# Patient Record
Sex: FEMALE | Race: WHITE | NOT HISPANIC OR LATINO | Employment: OTHER | URBAN - METROPOLITAN AREA
[De-identification: names, ages, dates, MRNs, and addresses within clinical notes are randomized per-mention and may not be internally consistent; named-entity substitution may affect disease eponyms.]

---

## 2020-01-28 ENCOUNTER — TELEPHONE (OUTPATIENT)
Dept: GASTROENTEROLOGY | Facility: CLINIC | Age: 60
End: 2020-01-28

## 2020-01-28 NOTE — TELEPHONE ENCOUNTER
----- Message from Patsy Brady sent at 1/28/2020  1:39 PM CST -----  [facility]  Patient was referred by Dr. Yun Wong for possible liver transplant, patient does have Medicaid, please call call Ms Lucero back at [phone]. Thank you

## 2020-01-28 NOTE — TELEPHONE ENCOUNTER
Spoke with Jazmine at [facility][1] who states that she wanted to know if referral from Dr. Yun Wong was received. Made Jazmine aware that I did see a referral in the chart and offered to schedule an appointment. Jazmine stated that she will speak with patient to see if appointment is still wanted. Patient has an interview scheduled with Hospice this week.     Jazmine states that she will give me a call back if patient decides to schedule appointment.

## 2020-02-12 ENCOUNTER — OFFICE VISIT (OUTPATIENT)
Dept: GASTROENTEROLOGY | Facility: CLINIC | Age: 60
End: 2020-02-12
Payer: MEDICAID

## 2020-02-12 ENCOUNTER — LAB VISIT (OUTPATIENT)
Dept: LAB | Facility: HOSPITAL | Age: 60
End: 2020-02-12
Attending: INTERNAL MEDICINE
Payer: MEDICAID

## 2020-02-12 VITALS
DIASTOLIC BLOOD PRESSURE: 68 MMHG | BODY MASS INDEX: 26.17 KG/M2 | HEART RATE: 102 BPM | SYSTOLIC BLOOD PRESSURE: 128 MMHG | HEIGHT: 63 IN | WEIGHT: 147.69 LBS

## 2020-02-12 DIAGNOSIS — R76.8 HEPATITIS C ANTIBODY TEST POSITIVE: ICD-10-CM

## 2020-02-12 DIAGNOSIS — K74.69 OTHER CIRRHOSIS OF LIVER: ICD-10-CM

## 2020-02-12 DIAGNOSIS — K74.69 OTHER CIRRHOSIS OF LIVER: Primary | ICD-10-CM

## 2020-02-12 DIAGNOSIS — R18.8 OTHER ASCITES: ICD-10-CM

## 2020-02-12 LAB
INR PPP: 1.4 (ref 0.8–1.2)
PROTHROMBIN TIME: 14.7 SEC (ref 9–12.5)

## 2020-02-12 PROCEDURE — 99213 OFFICE O/P EST LOW 20 MIN: CPT | Mod: PBBFAC | Performed by: INTERNAL MEDICINE

## 2020-02-12 PROCEDURE — 85027 COMPLETE CBC AUTOMATED: CPT

## 2020-02-12 PROCEDURE — 87522 HEPATITIS C REVRS TRNSCRPJ: CPT

## 2020-02-12 PROCEDURE — 82105 ALPHA-FETOPROTEIN SERUM: CPT

## 2020-02-12 PROCEDURE — 80053 COMPREHEN METABOLIC PANEL: CPT

## 2020-02-12 PROCEDURE — 99999 PR PBB SHADOW E&M-EST. PATIENT-LVL III: CPT | Mod: PBBFAC,,, | Performed by: INTERNAL MEDICINE

## 2020-02-12 PROCEDURE — 86790 VIRUS ANTIBODY NOS: CPT

## 2020-02-12 PROCEDURE — 85610 PROTHROMBIN TIME: CPT

## 2020-02-12 PROCEDURE — 36415 COLL VENOUS BLD VENIPUNCTURE: CPT

## 2020-02-12 PROCEDURE — 85007 BL SMEAR W/DIFF WBC COUNT: CPT

## 2020-02-12 PROCEDURE — 99205 PR OFFICE/OUTPT VISIT, NEW, LEVL V, 60-74 MIN: ICD-10-PCS | Mod: S$PBB,,, | Performed by: INTERNAL MEDICINE

## 2020-02-12 PROCEDURE — 99205 OFFICE O/P NEW HI 60 MIN: CPT | Mod: S$PBB,,, | Performed by: INTERNAL MEDICINE

## 2020-02-12 PROCEDURE — 99999 PR PBB SHADOW E&M-EST. PATIENT-LVL III: ICD-10-PCS | Mod: PBBFAC,,, | Performed by: INTERNAL MEDICINE

## 2020-02-12 RX ORDER — SPIRONOLACTONE 50 MG/1
50 TABLET, FILM COATED ORAL 2 TIMES DAILY
COMMUNITY
Start: 2020-01-21 | End: 2020-04-08 | Stop reason: SDUPTHER

## 2020-02-12 RX ORDER — CYCLOBENZAPRINE HCL 10 MG
10 TABLET ORAL 3 TIMES DAILY PRN
COMMUNITY
Start: 2020-02-07 | End: 2020-02-17

## 2020-02-12 RX ORDER — ALBUTEROL SULFATE 90 UG/1
2 AEROSOL, METERED RESPIRATORY (INHALATION) EVERY 6 HOURS
COMMUNITY
Start: 2020-01-02

## 2020-02-12 RX ORDER — LACTULOSE 10 G/15ML
30 SOLUTION ORAL 3 TIMES DAILY
COMMUNITY
End: 2020-03-07 | Stop reason: SDUPTHER

## 2020-02-12 RX ORDER — CIPROFLOXACIN 250 MG/1
750 TABLET, FILM COATED ORAL
COMMUNITY
Start: 2020-02-03 | End: 2020-02-12 | Stop reason: SDUPTHER

## 2020-02-12 RX ORDER — ONDANSETRON 4 MG/1
4 TABLET, ORALLY DISINTEGRATING ORAL EVERY 8 HOURS PRN
COMMUNITY
Start: 2020-02-07 | End: 2020-02-14

## 2020-02-12 RX ORDER — PANTOPRAZOLE SODIUM 40 MG/1
40 TABLET, DELAYED RELEASE ORAL 2 TIMES DAILY
COMMUNITY
Start: 2019-12-22 | End: 2020-05-20 | Stop reason: SDUPTHER

## 2020-02-12 RX ORDER — CIPROFLOXACIN 500 MG/1
500 TABLET ORAL DAILY
Qty: 30 TABLET | Refills: 2 | Status: SHIPPED | OUTPATIENT
Start: 2020-02-12 | End: 2020-03-13

## 2020-02-12 RX ORDER — FUROSEMIDE 40 MG/1
40 TABLET ORAL DAILY
COMMUNITY
Start: 2020-02-08 | End: 2020-02-14 | Stop reason: SDUPTHER

## 2020-02-12 RX ORDER — AMITRIPTYLINE HYDROCHLORIDE 25 MG/1
25 TABLET, FILM COATED ORAL NIGHTLY
COMMUNITY
Start: 2020-02-07 | End: 2021-02-06

## 2020-02-12 NOTE — PROGRESS NOTES
Subjective:     Jossy Siegel is here for evaluation of Cirrhosis      HPI  Jossy Siegel is here for evaluation of decompensated cirrhosis.  She has a history of ascites and encephalopathy.  She was being sent to hospice but want to explore other options.  Seems she has significant muscle wasting with weight loss but continues with paracentesis.  She had the paracentesis weekly.  She has had some issues with encephalopathy that are relatively well controlled at this time but she has been admitted before because of hepatic encephalopathy.  She reports a history of hepatitis C but no previous treatment.        Outside records reviewed    CT 12/2019    IMPRESSION:    1. Large ascites, with interval progression..  2. Liver cirrhosis previously noted.  3. Splenomegaly.  4. Other findings as described.    Review of Systems   Constitutional: Positive for activity change, appetite change and fatigue.   HENT: Negative.    Eyes: Negative.    Respiratory: Negative.    Cardiovascular: Negative.    Gastrointestinal: Positive for abdominal distention.   Genitourinary: Negative.    Musculoskeletal: Negative.    Skin: Negative.    Neurological: Negative.    Psychiatric/Behavioral: Negative.        Objective:     Physical Exam   Constitutional: She is oriented to person, place, and time. No distress.   Then, muscle wasting   HENT:   Head: Normocephalic and atraumatic.   Mouth/Throat: Oropharynx is clear and moist. No oropharyngeal exudate.   Eyes: Pupils are equal, round, and reactive to light. Conjunctivae are normal. Right eye exhibits no discharge. Left eye exhibits no discharge. No scleral icterus.   Pulmonary/Chest: Effort normal and breath sounds normal. No respiratory distress. She has no wheezes.   Abdominal: Soft. She exhibits distension (Large volume ascites). There is no tenderness.   Musculoskeletal: She exhibits edema.   Neurological: She is alert and oriented to person, place, and time.   Psychiatric: She has  a normal mood and affect. Her behavior is normal.   Vitals reviewed.      MELD-Na score: 11 at 2/12/2020  5:35 PM  MELD score: 11 at 2/12/2020  5:35 PM  Calculated from:  Serum Creatinine: 0.9 mg/dL (Rounded to 1 mg/dL) at 2/12/2020  5:32 PM  Serum Sodium: 136 mmol/L at 2/12/2020  5:32 PM  Total Bilirubin: 1.2 mg/dL at 2/12/2020  5:32 PM  INR(ratio): 1.4 at 2/12/2020  5:35 PM  Age: 59 years    WBC   Date Value Ref Range Status   02/12/2020 1.81 (LL) 3.90 - 12.70 K/uL Final     Comment:     WBC critical result(s) called and verbal readback obtained from FRANCHESCA ANGEL MD by [facility] 02/13/2020 20:11       Hemoglobin   Date Value Ref Range Status   02/12/2020 9.1 (L) 12.0 - 16.0 g/dL Final     Hematocrit   Date Value Ref Range Status   02/12/2020 28.9 (L) 37.0 - 48.5 % Final     Platelets   Date Value Ref Range Status   02/12/2020 89 (L) 150 - 350 K/uL Final     BUN, Bld   Date Value Ref Range Status   02/12/2020 8 8 - 23 mg/dL Final     Creatinine   Date Value Ref Range Status   02/12/2020 0.9 0.5 - 1.4 mg/dL Final     Glucose   Date Value Ref Range Status   02/12/2020 90 70 - 110 mg/dL Final     Calcium   Date Value Ref Range Status   02/12/2020 8.2 (L) 8.7 - 10.5 mg/dL Final     Sodium   Date Value Ref Range Status   02/12/2020 136 136 - 145 mmol/L Final     Potassium   Date Value Ref Range Status   02/12/2020 3.9 3.5 - 5.1 mmol/L Final     Chloride   Date Value Ref Range Status   02/12/2020 100 95 - 110 mmol/L Final     AST   Date Value Ref Range Status   02/12/2020 54 (H) 10 - 40 U/L Final     ALT   Date Value Ref Range Status   02/12/2020 26 10 - 44 U/L Final     Alkaline Phosphatase   Date Value Ref Range Status   02/12/2020 127 55 - 135 U/L Final     Total Bilirubin   Date Value Ref Range Status   02/12/2020 1.2 (H) 0.1 - 1.0 mg/dL Final     Comment:     For infants and newborns, interpretation of results should be based  on gestational age, weight and in agreement with clinical  observations.  Premature Infant  recommended reference ranges:  Up to 24 hours.............<8.0 mg/dL  Up to 48 hours............<12.0 mg/dL  3-5 days..................<15.0 mg/dL  6-29 days.................<15.0 mg/dL       Albumin   Date Value Ref Range Status   02/12/2020 3.0 (L) 3.5 - 5.2 g/dL Final     INR   Date Value Ref Range Status   02/12/2020 1.4 (H) 0.8 - 1.2 Final     Comment:     Coumadin Therapy:  2.0 - 3.0 for INR for all indicators except mechanical heart valves  and antiphospholipid syndromes which should use 2.5 - 3.5.           Assessment/Plan:     1. Other cirrhosis of liver    2. Hepatitis C antibody test positive    3. Other ascites      Jossy Siegel is a 59 y.o. female withCirrhosis    Other cirrhosis of liver-significant liver decompensation.  Meld score has been relatively low.  Patient is 2nd and meld score would suggest.  Could consider TIPS but concerned about history of encephalopathy along with her frailty.  If TIPS were going to be done I still think transplant evaluation is needed 1st.  Extensive discussion with patient and caregiver about liver transplantation and recommendation that would likely proceed with transplant evaluation.  -     Comprehensive metabolic panel; Future; Expected date: 02/12/2020  -     CBC auto differential; Future; Expected date: 02/12/2020  -     AFP tumor marker; Future; Expected date: 02/12/2020  -     Protime-INR; Future; Expected date: 02/12/2020  -     Hepatitis A antibody, IgG; Future; Expected date: 02/12/2020  -     Hepatitis C RNA, quantitative, PCR; Future; Expected date: 02/12/2020  -     Comprehensive metabolic panel; Future; Expected date: 02/12/2020  -     CBC auto differential; Future; Expected date: 02/12/2020  -     Protime-INR; Future; Expected date: 02/12/2020    Hepatitis C antibody test positive  -     Hepatitis C RNA, quantitative, PCR; Future; Expected date: 02/12/2020    Other ascites-uncontrolled  -concern about ability to titrate diuretics but will  try  -continue with paracenteses weekly  -     US Paracentesis inc Imaging; Standing  -     ciprofloxacin HCl (CIPRO) 500 MG tablet; Take 1 tablet (500 mg total) by mouth once daily.  Dispense: 30 tablet; Refill: 2      Return to clinic in 4 week    UNOS Patient Status  Functional Status: 50% - Requires considerable assistance and frequent medical care  Physical Capacity: Limited Mobility      Alma Delia Green MD

## 2020-02-12 NOTE — H&P (VIEW-ONLY)
Subjective:     Jossy Siegel is here for evaluation of Cirrhosis      HPI  Jossy Siegel is here for evaluation of decompensated cirrhosis.  She has a history of ascites and encephalopathy.  She was being sent to hospice but want to explore other options.  Seems she has significant muscle wasting with weight loss but continues with paracentesis.  She had the paracentesis weekly.  She has had some issues with encephalopathy that are relatively well controlled at this time but she has been admitted before because of hepatic encephalopathy.  She reports a history of hepatitis C but no previous treatment.        Outside records reviewed    CT 12/2019    IMPRESSION:    1. Large ascites, with interval progression..  2. Liver cirrhosis previously noted.  3. Splenomegaly.  4. Other findings as described.    Review of Systems   Constitutional: Positive for activity change, appetite change and fatigue.   HENT: Negative.    Eyes: Negative.    Respiratory: Negative.    Cardiovascular: Negative.    Gastrointestinal: Positive for abdominal distention.   Genitourinary: Negative.    Musculoskeletal: Negative.    Skin: Negative.    Neurological: Negative.    Psychiatric/Behavioral: Negative.        Objective:     Physical Exam   Constitutional: She is oriented to person, place, and time. No distress.   Then, muscle wasting   HENT:   Head: Normocephalic and atraumatic.   Mouth/Throat: Oropharynx is clear and moist. No oropharyngeal exudate.   Eyes: Pupils are equal, round, and reactive to light. Conjunctivae are normal. Right eye exhibits no discharge. Left eye exhibits no discharge. No scleral icterus.   Pulmonary/Chest: Effort normal and breath sounds normal. No respiratory distress. She has no wheezes.   Abdominal: Soft. She exhibits distension (Large volume ascites). There is no tenderness.   Musculoskeletal: She exhibits edema.   Neurological: She is alert and oriented to person, place, and time.   Psychiatric: She has  a normal mood and affect. Her behavior is normal.   Vitals reviewed.      MELD-Na score: 11 at 2/12/2020  5:35 PM  MELD score: 11 at 2/12/2020  5:35 PM  Calculated from:  Serum Creatinine: 0.9 mg/dL (Rounded to 1 mg/dL) at 2/12/2020  5:32 PM  Serum Sodium: 136 mmol/L at 2/12/2020  5:32 PM  Total Bilirubin: 1.2 mg/dL at 2/12/2020  5:32 PM  INR(ratio): 1.4 at 2/12/2020  5:35 PM  Age: 59 years    WBC   Date Value Ref Range Status   02/12/2020 1.81 (LL) 3.90 - 12.70 K/uL Final     Comment:     WBC critical result(s) called and verbal readback obtained from FRANCHESCA ANGEL MD by [facility] 02/13/2020 20:11       Hemoglobin   Date Value Ref Range Status   02/12/2020 9.1 (L) 12.0 - 16.0 g/dL Final     Hematocrit   Date Value Ref Range Status   02/12/2020 28.9 (L) 37.0 - 48.5 % Final     Platelets   Date Value Ref Range Status   02/12/2020 89 (L) 150 - 350 K/uL Final     BUN, Bld   Date Value Ref Range Status   02/12/2020 8 8 - 23 mg/dL Final     Creatinine   Date Value Ref Range Status   02/12/2020 0.9 0.5 - 1.4 mg/dL Final     Glucose   Date Value Ref Range Status   02/12/2020 90 70 - 110 mg/dL Final     Calcium   Date Value Ref Range Status   02/12/2020 8.2 (L) 8.7 - 10.5 mg/dL Final     Sodium   Date Value Ref Range Status   02/12/2020 136 136 - 145 mmol/L Final     Potassium   Date Value Ref Range Status   02/12/2020 3.9 3.5 - 5.1 mmol/L Final     Chloride   Date Value Ref Range Status   02/12/2020 100 95 - 110 mmol/L Final     AST   Date Value Ref Range Status   02/12/2020 54 (H) 10 - 40 U/L Final     ALT   Date Value Ref Range Status   02/12/2020 26 10 - 44 U/L Final     Alkaline Phosphatase   Date Value Ref Range Status   02/12/2020 127 55 - 135 U/L Final     Total Bilirubin   Date Value Ref Range Status   02/12/2020 1.2 (H) 0.1 - 1.0 mg/dL Final     Comment:     For infants and newborns, interpretation of results should be based  on gestational age, weight and in agreement with clinical  observations.  Premature Infant  recommended reference ranges:  Up to 24 hours.............<8.0 mg/dL  Up to 48 hours............<12.0 mg/dL  3-5 days..................<15.0 mg/dL  6-29 days.................<15.0 mg/dL       Albumin   Date Value Ref Range Status   02/12/2020 3.0 (L) 3.5 - 5.2 g/dL Final     INR   Date Value Ref Range Status   02/12/2020 1.4 (H) 0.8 - 1.2 Final     Comment:     Coumadin Therapy:  2.0 - 3.0 for INR for all indicators except mechanical heart valves  and antiphospholipid syndromes which should use 2.5 - 3.5.           Assessment/Plan:     1. Other cirrhosis of liver    2. Hepatitis C antibody test positive    3. Other ascites      Jossy Siegel is a 59 y.o. female withCirrhosis    Other cirrhosis of liver-significant liver decompensation.  Meld score has been relatively low.  Patient is 2nd and meld score would suggest.  Could consider TIPS but concerned about history of encephalopathy along with her frailty.  If TIPS were going to be done I still think transplant evaluation is needed 1st.  Extensive discussion with patient and caregiver about liver transplantation and recommendation that would likely proceed with transplant evaluation.  -     Comprehensive metabolic panel; Future; Expected date: 02/12/2020  -     CBC auto differential; Future; Expected date: 02/12/2020  -     AFP tumor marker; Future; Expected date: 02/12/2020  -     Protime-INR; Future; Expected date: 02/12/2020  -     Hepatitis A antibody, IgG; Future; Expected date: 02/12/2020  -     Hepatitis C RNA, quantitative, PCR; Future; Expected date: 02/12/2020  -     Comprehensive metabolic panel; Future; Expected date: 02/12/2020  -     CBC auto differential; Future; Expected date: 02/12/2020  -     Protime-INR; Future; Expected date: 02/12/2020    Hepatitis C antibody test positive  -     Hepatitis C RNA, quantitative, PCR; Future; Expected date: 02/12/2020    Other ascites-uncontrolled  -concern about ability to titrate diuretics but will  try  -continue with paracenteses weekly  -     US Paracentesis inc Imaging; Standing  -     ciprofloxacin HCl (CIPRO) 500 MG tablet; Take 1 tablet (500 mg total) by mouth once daily.  Dispense: 30 tablet; Refill: 2      Return to clinic in 4 week    UNOS Patient Status  Functional Status: 50% - Requires considerable assistance and frequent medical care  Physical Capacity: Limited Mobility      Alma Delia Green MD

## 2020-02-13 ENCOUNTER — HOSPITAL ENCOUNTER (OUTPATIENT)
Dept: RADIOLOGY | Facility: HOSPITAL | Age: 60
Discharge: HOME | End: 2020-02-13
Attending: INTERNAL MEDICINE
Payer: MEDICAID

## 2020-02-13 VITALS
OXYGEN SATURATION: 98 % | RESPIRATION RATE: 16 BRPM | WEIGHT: 147 LBS | HEART RATE: 92 BPM | DIASTOLIC BLOOD PRESSURE: 59 MMHG | SYSTOLIC BLOOD PRESSURE: 112 MMHG | BODY MASS INDEX: 26.05 KG/M2 | HEIGHT: 63 IN

## 2020-02-13 DIAGNOSIS — R18.8 OTHER ASCITES: ICD-10-CM

## 2020-02-13 LAB
AFP SERPL-MCNC: 9.5 NG/ML (ref 0–8.4)
ALBUMIN SERPL BCP-MCNC: 3 G/DL (ref 3.5–5.2)
ALP SERPL-CCNC: 127 U/L (ref 55–135)
ALT SERPL W/O P-5'-P-CCNC: 26 U/L (ref 10–44)
ANION GAP SERPL CALC-SCNC: 7 MMOL/L (ref 8–16)
ANISOCYTOSIS BLD QL SMEAR: SLIGHT
APPEARANCE FLD: CLEAR
AST SERPL-CCNC: 54 U/L (ref 10–40)
BASOPHILS # BLD AUTO: ABNORMAL K/UL (ref 0–0.2)
BASOPHILS NFR BLD: 2 % (ref 0–1.9)
BILIRUB SERPL-MCNC: 1.2 MG/DL (ref 0.1–1)
BODY FLD TYPE: NORMAL
BUN SERPL-MCNC: 8 MG/DL (ref 8–23)
CALCIUM SERPL-MCNC: 8.2 MG/DL (ref 8.7–10.5)
CHLORIDE SERPL-SCNC: 100 MMOL/L (ref 95–110)
CO2 SERPL-SCNC: 29 MMOL/L (ref 23–29)
COLOR FLD: YELLOW
CREAT SERPL-MCNC: 0.9 MG/DL (ref 0.5–1.4)
DIFFERENTIAL METHOD: ABNORMAL
EOSINOPHIL # BLD AUTO: ABNORMAL K/UL (ref 0–0.5)
EOSINOPHIL NFR BLD: 6 % (ref 0–8)
ERYTHROCYTE [DISTWIDTH] IN BLOOD BY AUTOMATED COUNT: 16.1 % (ref 11.5–14.5)
EST. GFR  (AFRICAN AMERICAN): >60 ML/MIN/1.73 M^2
EST. GFR  (NON AFRICAN AMERICAN): >60 ML/MIN/1.73 M^2
GLUCOSE SERPL-MCNC: 90 MG/DL (ref 70–110)
HCT VFR BLD AUTO: 28.9 % (ref 37–48.5)
HGB BLD-MCNC: 9.1 G/DL (ref 12–16)
IMM GRANULOCYTES # BLD AUTO: ABNORMAL K/UL (ref 0–0.04)
IMM GRANULOCYTES NFR BLD AUTO: ABNORMAL % (ref 0–0.5)
LYMPHOCYTES # BLD AUTO: ABNORMAL K/UL (ref 1–4.8)
LYMPHOCYTES NFR BLD: 26 % (ref 18–48)
LYMPHOCYTES NFR FLD MANUAL: 35 %
MCH RBC QN AUTO: 32.6 PG (ref 27–31)
MCHC RBC AUTO-ENTMCNC: 31.5 G/DL (ref 32–36)
MCV RBC AUTO: 104 FL (ref 82–98)
MONOCYTES # BLD AUTO: ABNORMAL K/UL (ref 0.3–1)
MONOCYTES NFR BLD: 16 % (ref 4–15)
MONOS+MACROS NFR FLD MANUAL: 65 %
NEUTROPHILS NFR BLD: 50 % (ref 38–73)
NRBC BLD-RTO: 0 /100 WBC
PLATELET # BLD AUTO: 89 K/UL (ref 150–350)
PLATELET BLD QL SMEAR: ABNORMAL
PMV BLD AUTO: 12.5 FL (ref 9.2–12.9)
POLYCHROMASIA BLD QL SMEAR: ABNORMAL
POTASSIUM SERPL-SCNC: 3.9 MMOL/L (ref 3.5–5.1)
PROT SERPL-MCNC: 7.3 G/DL (ref 6–8.4)
RBC # BLD AUTO: 2.79 M/UL (ref 4–5.4)
SODIUM SERPL-SCNC: 136 MMOL/L (ref 136–145)
WBC # BLD AUTO: 1.81 K/UL (ref 3.9–12.7)
WBC # FLD: 83 /CU MM

## 2020-02-13 PROCEDURE — 49083 ABD PARACENTESIS W/IMAGING: CPT

## 2020-02-13 PROCEDURE — 89051 BODY FLUID CELL COUNT: CPT

## 2020-02-13 PROCEDURE — 87075 CULTR BACTERIA EXCEPT BLOOD: CPT

## 2020-02-13 PROCEDURE — 87205 SMEAR GRAM STAIN: CPT

## 2020-02-13 PROCEDURE — 87070 CULTURE OTHR SPECIMN AEROBIC: CPT

## 2020-02-13 NOTE — PLAN OF CARE
6 liters of colette fluid removed from left side abdomen punctures site, site WDL.  Pt d/c home in stable condition with ride.  Verbalized understanding of d/c instructions, handout given.  Pt voiced no complaints at this time, verbalized relief of abdominal distention.

## 2020-02-13 NOTE — DISCHARGE INSTRUCTIONS

## 2020-02-13 NOTE — H&P
Subjective:     Jossy Siegel is here for evaluation of No chief complaint on file.      HPI  Jossy Siegel is here for evaluation of decompensated cirrhosis.  She has a history of ascites encephalopathy.    One time admission for HE    Outside records reviewed    CT 12/2019    IMPRESSION:    1. Large ascites, with interval progression..  2. Liver cirrhosis previously noted.  3. Splenomegaly.  4. Other findings as described.    Review of Systems   Constitutional: Negative.    HENT: Negative.    Eyes: Negative.    Respiratory: Negative.    Cardiovascular: Negative.        Objective:     Physical Exam   HENT:   Head: Normocephalic.   Eyes: Pupils are equal, round, and reactive to light.   Neck: Normal range of motion.   Cardiovascular: Normal rate.   Abdominal: She exhibits distension.       Computed MELD-Na score unavailable. Necessary lab results were not found in the last year.  Computed MELD score unavailable. Necessary lab results were not found in the last year.    No results found for: WBC, HGB, HCT, PLT  No results found for: BUN, CREATININE, GLU, CALCIUM, NA, K, CL, MG  No results found for: AST, ALT, ALKPHOS, BILITOT, ALBUMIN  INR   Date Value Ref Range Status   02/12/2020 1.4 (H) 0.8 - 1.2 Final     Comment:     Coumadin Therapy:  2.0 - 3.0 for INR for all indicators except mechanical heart valves  and antiphospholipid syndromes which should use 2.5 - 3.5.           Assessment/Plan:     1. Other ascites      Jossy Siegel is a 59 y.o. female withNo chief complaint on file.      Case  ERICK Godinez

## 2020-02-14 DIAGNOSIS — R18.8 OTHER ASCITES: Primary | ICD-10-CM

## 2020-02-14 LAB
HEPATITIS A ANTIBODY, IGG: POSITIVE
PATH INTERP FLD-IMP: NORMAL

## 2020-02-14 RX ORDER — FUROSEMIDE 40 MG/1
40 TABLET ORAL 2 TIMES DAILY
Qty: 60 TABLET | Refills: 5 | Status: SHIPPED | OUTPATIENT
Start: 2020-02-14 | End: 2021-02-13

## 2020-02-17 PROBLEM — K74.69 OTHER CIRRHOSIS OF LIVER: Status: ACTIVE | Noted: 2020-02-17

## 2020-02-17 PROBLEM — R76.8 HEPATITIS C ANTIBODY TEST POSITIVE: Status: ACTIVE | Noted: 2020-02-17

## 2020-02-17 PROBLEM — R18.8 OTHER ASCITES: Status: ACTIVE | Noted: 2020-02-17

## 2020-02-17 LAB
HCV RNA SERPL NAA+PROBE-LOG IU: 5.29 LOG (10) IU/ML
HCV RNA SERPL QL NAA+PROBE: DETECTED IU/ML
HCV RNA SPEC NAA+PROBE-ACNC: ABNORMAL IU/ML

## 2020-02-18 LAB
BACTERIA FLD AEROBE CULT: NO GROWTH
GRAM STN SPEC: NORMAL
GRAM STN SPEC: NORMAL

## 2020-02-21 LAB — BACTERIA SPEC ANAEROBE CULT: NORMAL

## 2020-02-23 ENCOUNTER — TELEPHONE (OUTPATIENT)
Dept: GASTROENTEROLOGY | Facility: HOSPITAL | Age: 60
End: 2020-02-23

## 2020-02-24 NOTE — TELEPHONE ENCOUNTER
Patient calls and she is having significant abdominal distension and needs paracentesis.  She had not reschedule to get a paracentesis done for unclear reasons.  Given that she felt significant distention with shortness of breath advised that she go to whichever emergency room she preferred for paracentesis.  Advised that she plan to schedule her paracenteses every 7-10 days to prevent having to use the emergency department.

## 2020-02-24 NOTE — TELEPHONE ENCOUNTER
Spoke with patient who states that she is feeling much better. She had a paracentesis performed on yesterday, 02/23/2020 at [facility].     Patient states that she has the number to [facility] to schedule next paracentesis. Patient voiced no questions or concerns.    Advised patient to contact our office as needed.

## 2020-02-26 ENCOUNTER — TELEPHONE (OUTPATIENT)
Dept: GASTROENTEROLOGY | Facility: CLINIC | Age: 60
End: 2020-02-26

## 2020-02-26 NOTE — TELEPHONE ENCOUNTER
Spoke with patient and gave results of lab work. Patient verbalized understanding to increasing diuretics. Patient scheduled BMP for 03/05/2020 at [facility].

## 2020-02-26 NOTE — TELEPHONE ENCOUNTER
----- Message from Jim Wilson sent at 2/26/2020  9:55 AM CST -----  Contact: pt  Please give pt a call at ..[phone] (home) she is returning the nurse call

## 2020-02-26 NOTE — TELEPHONE ENCOUNTER
----- Message from Lianet Goetz sent at 2/26/2020  3:16 PM CST -----  .Type:  Patient Returning Call    Who Called:self  Who Left Message for Patient:call  Does the patient know what this is regarding?:yes  Would the patient rather a call back or a response via [facility]? call  Best Call Back Number:.[phone] (home)   Additional Information:

## 2020-02-27 ENCOUNTER — HOSPITAL ENCOUNTER (OUTPATIENT)
Dept: RADIOLOGY | Facility: HOSPITAL | Age: 60
Discharge: HOME | End: 2020-02-27
Attending: INTERNAL MEDICINE
Payer: MEDICAID

## 2020-02-27 VITALS
HEIGHT: 62 IN | RESPIRATION RATE: 16 BRPM | WEIGHT: 140 LBS | DIASTOLIC BLOOD PRESSURE: 63 MMHG | OXYGEN SATURATION: 98 % | HEART RATE: 73 BPM | BODY MASS INDEX: 25.76 KG/M2 | SYSTOLIC BLOOD PRESSURE: 119 MMHG

## 2020-02-27 DIAGNOSIS — R18.8 OTHER ASCITES: ICD-10-CM

## 2020-02-27 LAB
APPEARANCE FLD: NORMAL
BODY FLD TYPE: NORMAL
COLOR FLD: YELLOW
LYMPHOCYTES NFR FLD MANUAL: 33 %
MONOS+MACROS NFR FLD MANUAL: 67 %
WBC # FLD: 88 /CU MM

## 2020-02-27 PROCEDURE — 89051 BODY FLUID CELL COUNT: CPT

## 2020-02-27 PROCEDURE — 49083 ABD PARACENTESIS W/IMAGING: CPT

## 2020-02-27 PROCEDURE — 87075 CULTR BACTERIA EXCEPT BLOOD: CPT

## 2020-02-27 PROCEDURE — 87070 CULTURE OTHR SPECIMN AEROBIC: CPT

## 2020-02-27 PROCEDURE — 87205 SMEAR GRAM STAIN: CPT

## 2020-02-27 NOTE — DISCHARGE INSTRUCTIONS

## 2020-02-27 NOTE — DISCHARGE SUMMARY
Pre Op Diagnosis: ascites     Post Op Diagnosis: same     Procedure:  para     Procedure performed by: Ameya MONTENEGRO, Herbert ONTIVEROS     Written Informed Consent Obtained: Yes     Specimen Removed:  yes     Estimated Blood Loss:  minimal     Findings: Local anesthesia.     The patient tolerated the procedure well and there were no complications.      Sterile technique was performed in the RLQ, lidocaine was used as a local anesthetic.   4.25 liters of light colette fluid removed for therapeutic purposes.  Pt tolerated the procedure well without immediate complications.  Please see radiologist report for details. F/u with PCP and/or ordering physician.

## 2020-02-28 LAB — PATH INTERP FLD-IMP: NORMAL

## 2020-03-02 LAB
BACTERIA FLD AEROBE CULT: NO GROWTH
GRAM STN SPEC: NORMAL
GRAM STN SPEC: NORMAL

## 2020-03-06 LAB — BACTERIA SPEC ANAEROBE CULT: NORMAL

## 2020-03-07 RX ORDER — LACTULOSE 10 G/15ML
30 SOLUTION ORAL 3 TIMES DAILY
Qty: 1000 ML | Refills: 3 | Status: ON HOLD | OUTPATIENT
Start: 2020-03-07 | End: 2020-04-23 | Stop reason: SDUPTHER

## 2020-03-12 ENCOUNTER — HOSPITAL ENCOUNTER (OUTPATIENT)
Dept: RADIOLOGY | Facility: HOSPITAL | Age: 60
Discharge: HOME | End: 2020-03-12
Attending: INTERNAL MEDICINE
Payer: MEDICAID

## 2020-03-12 VITALS
DIASTOLIC BLOOD PRESSURE: 62 MMHG | HEART RATE: 97 BPM | SYSTOLIC BLOOD PRESSURE: 121 MMHG | WEIGHT: 140 LBS | RESPIRATION RATE: 16 BRPM | BODY MASS INDEX: 25.76 KG/M2 | OXYGEN SATURATION: 99 % | HEIGHT: 62 IN

## 2020-03-12 DIAGNOSIS — R18.8 OTHER ASCITES: ICD-10-CM

## 2020-03-12 LAB
APPEARANCE FLD: CLEAR
BODY FLD TYPE: NORMAL
COLOR FLD: COLORLESS
LYMPHOCYTES NFR FLD MANUAL: 19 %
MONOS+MACROS NFR FLD MANUAL: 78 %
NEUTROPHILS NFR FLD MANUAL: 3 %
WBC # FLD: 125 /CU MM

## 2020-03-12 PROCEDURE — 89051 BODY FLUID CELL COUNT: CPT

## 2020-03-12 PROCEDURE — 87075 CULTR BACTERIA EXCEPT BLOOD: CPT

## 2020-03-12 PROCEDURE — 87205 SMEAR GRAM STAIN: CPT

## 2020-03-12 PROCEDURE — 49083 ABD PARACENTESIS W/IMAGING: CPT

## 2020-03-12 PROCEDURE — 87070 CULTURE OTHR SPECIMN AEROBIC: CPT

## 2020-03-12 NOTE — DISCHARGE INSTRUCTIONS

## 2020-03-12 NOTE — DISCHARGE SUMMARY
Pre Op Diagnosis: ascites     Post Op Diagnosis: same     Procedure:  para     Procedure performed by: Ameya MONTENEGRO, Herbert ONTIVEROS     Written Informed Consent Obtained: Yes     Specimen Removed:  yes     Estimated Blood Loss:  minimal     Findings: Local anesthesia.     The patient tolerated the procedure well and there were no complications.      Sterile technique was performed in the lower abdomen, lidocaine was used as a local anesthetic.   4 liters of colette fluid removed for therapeutic purposes.  Pt tolerated the procedure well without immediate complications.  Please see radiologist report for details. F/u with PCP and/or ordering physician.

## 2020-03-12 NOTE — PLAN OF CARE
4 liters of colette fluid removed from R side abdomen puncture site, site WDL.  Pt d/c home in stable condition with ride.  Verbalized understanding of d/c instructions, handout given.  Pt voiced no complaints at this time, verbalized relief of abdominal distention. Future paracentesis appointment made on 3/26 at 230 pm per pt request.

## 2020-03-12 NOTE — H&P
Subjective:     Jossy Siegel is here for evaluation of No chief complaint on file.      HPI  Jossy Siegel is here for evaluation of decompensated cirrhosis.  She has a history of ascites encephalopathy.    One time admission for HE    Outside records reviewed    CT 12/2019    IMPRESSION:    1. Large ascites, with interval progression..  2. Liver cirrhosis previously noted.  3. Splenomegaly.  4. Other findings as described.    Review of Systems   Constitutional: Negative.    HENT: Negative.    Eyes: Negative.    Respiratory: Negative.    Cardiovascular: Negative.        Objective:     Physical Exam   HENT:   Head: Normocephalic.   Eyes: Pupils are equal, round, and reactive to light.   Neck: Normal range of motion.   Cardiovascular: Normal rate.   Abdominal: She exhibits distension.       MELD-Na score: 11 at 2/12/2020  5:35 PM  MELD score: 11 at 2/12/2020  5:35 PM  Calculated from:  Serum Creatinine: 0.9 mg/dL (Rounded to 1 mg/dL) at 2/12/2020  5:32 PM  Serum Sodium: 136 mmol/L at 2/12/2020  5:32 PM  Total Bilirubin: 1.2 mg/dL at 2/12/2020  5:32 PM  INR(ratio): 1.4 at 2/12/2020  5:35 PM  Age: 59 years    WBC   Date Value Ref Range Status   02/12/2020 1.81 (LL) 3.90 - 12.70 K/uL Final     Comment:     WBC critical result(s) called and verbal readback obtained from FRANCHESCA ANGEL MD by [facility] 02/13/2020 20:11       Hemoglobin   Date Value Ref Range Status   02/12/2020 9.1 (L) 12.0 - 16.0 g/dL Final     Hematocrit   Date Value Ref Range Status   02/12/2020 28.9 (L) 37.0 - 48.5 % Final     Platelets   Date Value Ref Range Status   02/12/2020 89 (L) 150 - 350 K/uL Final     BUN, Bld   Date Value Ref Range Status   02/12/2020 8 8 - 23 mg/dL Final     Creatinine   Date Value Ref Range Status   02/12/2020 0.9 0.5 - 1.4 mg/dL Final     Glucose   Date Value Ref Range Status   02/12/2020 90 70 - 110 mg/dL Final     Calcium   Date Value Ref Range Status   02/12/2020 8.2 (L) 8.7 - 10.5 mg/dL Final     Sodium   Date  Value Ref Range Status   02/12/2020 136 136 - 145 mmol/L Final     Potassium   Date Value Ref Range Status   02/12/2020 3.9 3.5 - 5.1 mmol/L Final     Chloride   Date Value Ref Range Status   02/12/2020 100 95 - 110 mmol/L Final     AST   Date Value Ref Range Status   02/12/2020 54 (H) 10 - 40 U/L Final     ALT   Date Value Ref Range Status   02/12/2020 26 10 - 44 U/L Final     Alkaline Phosphatase   Date Value Ref Range Status   02/12/2020 127 55 - 135 U/L Final     Total Bilirubin   Date Value Ref Range Status   02/12/2020 1.2 (H) 0.1 - 1.0 mg/dL Final     Comment:     For infants and newborns, interpretation of results should be based  on gestational age, weight and in agreement with clinical  observations.  Premature Infant recommended reference ranges:  Up to 24 hours.............<8.0 mg/dL  Up to 48 hours............<12.0 mg/dL  3-5 days..................<15.0 mg/dL  6-29 days.................<15.0 mg/dL       Albumin   Date Value Ref Range Status   02/12/2020 3.0 (L) 3.5 - 5.2 g/dL Final     INR   Date Value Ref Range Status   02/12/2020 1.4 (H) 0.8 - 1.2 Final     Comment:     Coumadin Therapy:  2.0 - 3.0 for INR for all indicators except mechanical heart valves  and antiphospholipid syndromes which should use 2.5 - 3.5.           Assessment/Plan:     1. Other ascites      Jossy Siegel is a 59 y.o. female withNo chief complaint on file.      Case  ERICK Godinez

## 2020-03-13 LAB — PATH INTERP FLD-IMP: NORMAL

## 2020-03-17 LAB
BACTERIA FLD AEROBE CULT: NO GROWTH
GRAM STN SPEC: NORMAL
GRAM STN SPEC: NORMAL

## 2020-03-18 ENCOUNTER — TELEPHONE (OUTPATIENT)
Dept: GASTROENTEROLOGY | Facility: CLINIC | Age: 60
End: 2020-03-18

## 2020-03-18 NOTE — TELEPHONE ENCOUNTER
Spoke with [facility] and they state that medication requires a prior authorization.     Xifaxin prescribed by Dr. Wong. Request has been sent to there office.

## 2020-03-18 NOTE — TELEPHONE ENCOUNTER
----- Message from Dana Fritz sent at 3/18/2020 10:10 AM CDT -----  Contact: pt  Patient is having issues with insurance approving on xifaxan.  Pharmacy [place] [facility] [phone] please call them to assist. Patient is out of meds for a week. Please call pt back at [phone] or [phone]

## 2020-03-19 ENCOUNTER — HOSPITAL ENCOUNTER (OUTPATIENT)
Dept: RADIOLOGY | Facility: HOSPITAL | Age: 60
Discharge: HOME | End: 2020-03-19
Attending: INTERNAL MEDICINE
Payer: MEDICAID

## 2020-03-19 VITALS
WEIGHT: 140 LBS | HEIGHT: 62 IN | DIASTOLIC BLOOD PRESSURE: 61 MMHG | HEART RATE: 112 BPM | BODY MASS INDEX: 25.76 KG/M2 | OXYGEN SATURATION: 100 % | RESPIRATION RATE: 17 BRPM | SYSTOLIC BLOOD PRESSURE: 116 MMHG

## 2020-03-19 DIAGNOSIS — R76.8 HEPATITIS C ANTIBODY TEST POSITIVE: ICD-10-CM

## 2020-03-19 DIAGNOSIS — R18.8 OTHER ASCITES: ICD-10-CM

## 2020-03-19 DIAGNOSIS — R18.8 OTHER ASCITES: Primary | ICD-10-CM

## 2020-03-19 DIAGNOSIS — K74.69 OTHER CIRRHOSIS OF LIVER: ICD-10-CM

## 2020-03-19 LAB
APPEARANCE FLD: CLEAR
BODY FLD TYPE: NORMAL
COLOR FLD: COLORLESS
LYMPHOCYTES NFR FLD MANUAL: 26 %
MONOS+MACROS NFR FLD MANUAL: 72 %
NEUTROPHILS NFR FLD MANUAL: 2 %
WBC # FLD: 91 /CU MM

## 2020-03-19 PROCEDURE — 87075 CULTR BACTERIA EXCEPT BLOOD: CPT

## 2020-03-19 PROCEDURE — 87070 CULTURE OTHR SPECIMN AEROBIC: CPT

## 2020-03-19 PROCEDURE — 87205 SMEAR GRAM STAIN: CPT

## 2020-03-19 PROCEDURE — 89051 BODY FLUID CELL COUNT: CPT

## 2020-03-19 PROCEDURE — 49083 ABD PARACENTESIS W/IMAGING: CPT

## 2020-03-19 NOTE — H&P (VIEW-ONLY)
Subjective:     Jossy Siegel is here for evaluation of No chief complaint on file.      HPI  Jossy Siegel is here for evaluation of decompensated cirrhosis.  She has a history of ascites encephalopathy.    One time admission for HE    Outside records reviewed    CT 12/2019    IMPRESSION:    1. Large ascites, with interval progression..  2. Liver cirrhosis previously noted.  3. Splenomegaly.  4. Other findings as described.    Review of Systems   Constitutional: Negative.    HENT: Negative.    Eyes: Negative.    Respiratory: Negative.    Cardiovascular: Negative.        Objective:     Physical Exam   HENT:   Head: Normocephalic.   Eyes: Pupils are equal, round, and reactive to light.   Neck: Normal range of motion.   Cardiovascular: Normal rate.   Abdominal: She exhibits distension.       MELD-Na score: 11 at 2/12/2020  5:35 PM  MELD score: 11 at 2/12/2020  5:35 PM  Calculated from:  Serum Creatinine: 0.9 mg/dL (Rounded to 1 mg/dL) at 2/12/2020  5:32 PM  Serum Sodium: 136 mmol/L at 2/12/2020  5:32 PM  Total Bilirubin: 1.2 mg/dL at 2/12/2020  5:32 PM  INR(ratio): 1.4 at 2/12/2020  5:35 PM  Age: 59 years    WBC   Date Value Ref Range Status   03/19/2020 4.10 3.90 - 12.70 K/uL Final     Hemoglobin   Date Value Ref Range Status   03/19/2020 11.4 (L) 12.0 - 16.0 g/dL Final     Hematocrit   Date Value Ref Range Status   03/19/2020 34.9 (L) 37.0 - 48.5 % Final     Platelets   Date Value Ref Range Status   03/19/2020 129 (L) 150 - 350 K/uL Final     BUN, Bld   Date Value Ref Range Status   02/12/2020 8 8 - 23 mg/dL Final     Creatinine   Date Value Ref Range Status   02/12/2020 0.9 0.5 - 1.4 mg/dL Final     Glucose   Date Value Ref Range Status   02/12/2020 90 70 - 110 mg/dL Final     Calcium   Date Value Ref Range Status   02/12/2020 8.2 (L) 8.7 - 10.5 mg/dL Final     Sodium   Date Value Ref Range Status   02/12/2020 136 136 - 145 mmol/L Final     Potassium   Date Value Ref Range Status   02/12/2020 3.9 3.5 - 5.1  mmol/L Final     Chloride   Date Value Ref Range Status   02/12/2020 100 95 - 110 mmol/L Final     AST   Date Value Ref Range Status   02/12/2020 54 (H) 10 - 40 U/L Final     ALT   Date Value Ref Range Status   02/12/2020 26 10 - 44 U/L Final     Alkaline Phosphatase   Date Value Ref Range Status   02/12/2020 127 55 - 135 U/L Final     Total Bilirubin   Date Value Ref Range Status   02/12/2020 1.2 (H) 0.1 - 1.0 mg/dL Final     Comment:     For infants and newborns, interpretation of results should be based  on gestational age, weight and in agreement with clinical  observations.  Premature Infant recommended reference ranges:  Up to 24 hours.............<8.0 mg/dL  Up to 48 hours............<12.0 mg/dL  3-5 days..................<15.0 mg/dL  6-29 days.................<15.0 mg/dL       Albumin   Date Value Ref Range Status   02/12/2020 3.0 (L) 3.5 - 5.2 g/dL Final     INR   Date Value Ref Range Status   03/19/2020 1.3 (H) 0.8 - 1.2 Final     Comment:     Coumadin Therapy:  2.0 - 3.0 for INR for all indicators except mechanical heart valves  and antiphospholipid syndromes which should use 2.5 - 3.5.           Assessment/Plan:     1. Other ascites      Jossy Siegel is a 59 y.o. female withNo chief complaint on file.      Case  ERICK Godinez

## 2020-03-19 NOTE — PLAN OF CARE
5.25 liters of light colette colored fluid removed during paracentesis. Pt tolerated well. VSS, no acute distress noted. Band aid placed to right flank from paracentesis site with no redness, bleeding/drainage, or swelling noted to site. Pt made apt for next week. Discharge instructions given to and reviewed with pt and verbalized understanding of all. Pt discharged to home and driven home by family.

## 2020-03-19 NOTE — DISCHARGE INSTRUCTIONS

## 2020-03-19 NOTE — DISCHARGE SUMMARY
Pre Op Diagnosis: ascites     Post Op Diagnosis: same     Procedure:  para     Procedure performed by: Ameya MONTENEGRO, Herbert ONTIVEROS     Written Informed Consent Obtained: Yes     Specimen Removed:  yes     Estimated Blood Loss:  minimal     Findings: Local anesthesia.     The patient tolerated the procedure well and there were no complications.      Sterile technique was performed in the RLQ, lidocaine was used as a local anesthetic.   5.25 liters of light colette fluid removed for therapeutic purposes.  Pt tolerated the procedure well without immediate complications.  Please see radiologist report for details. F/u with PCP and/or ordering physician.

## 2020-03-20 LAB
BACTERIA SPEC ANAEROBE CULT: NORMAL
PATH INTERP FLD-IMP: NORMAL

## 2020-03-23 LAB
BACTERIA FLD AEROBE CULT: NO GROWTH
GRAM STN SPEC: NORMAL
GRAM STN SPEC: NORMAL

## 2020-03-23 NOTE — TELEPHONE ENCOUNTER
Need to figure out why patient was prescribed that dosing of Xifamin. If she is not having any issues with confusion we can hold on refilling it.

## 2020-03-24 ENCOUNTER — TELEPHONE (OUTPATIENT)
Dept: GASTROENTEROLOGY | Facility: CLINIC | Age: 60
End: 2020-03-24

## 2020-03-24 NOTE — TELEPHONE ENCOUNTER
Spoke with patient who states that she is unsure who she was prescribed Xifaxan 200 mg two tabs daily. Patient states that she is not having any issues with confusion.     Advised patient that I would ask Dr. Green about holding medication for now. Patient verbalized understanding.

## 2020-03-24 NOTE — TELEPHONE ENCOUNTER
----- Message from Teresa Rabago sent at 3/24/2020  1:54 PM CDT -----  Contact: self  Type:  RX Refill Request    Who Called: Jossy  Refill or New Rx:refill  RX Name and Strength: rifAXImin (XIFAXAN) 200 mg Tab   How is the patient currently taking it? (ex. 1XDay):  Is this a 30 day or 90 day RX:  Preferred Pharmacy with phone number:  [facility] - [address] [address]  Phone: [phone] Fax: [phone]    Local or Mail Order:local  Ordering Provider:Peter  Would the patient rather a call back or a response via [facility]? call  Best Call Back Number:[phone]  Additional Information:

## 2020-03-24 NOTE — TELEPHONE ENCOUNTER
----- Message from Teresa Rabago sent at 3/24/2020  2:18 PM CDT -----  Contact: self  Type:  Patient Returning Call    Who Called:Jossy  Who Left Message for Patient:  Does the patient know what this is regarding?:yes  Would the patient rather a call back or a response via [facility]? call  Best Call Back Number:[phone]  Additional Information:

## 2020-03-24 NOTE — TELEPHONE ENCOUNTER
Attempted to return call to patient at [phone] with no answer. Left voicemail message for patient to return call.

## 2020-03-26 ENCOUNTER — TELEPHONE (OUTPATIENT)
Dept: RADIOLOGY | Facility: HOSPITAL | Age: 60
End: 2020-03-26

## 2020-03-26 LAB — BACTERIA SPEC ANAEROBE CULT: NORMAL

## 2020-03-27 ENCOUNTER — HOSPITAL ENCOUNTER (OUTPATIENT)
Dept: RADIOLOGY | Facility: HOSPITAL | Age: 60
Discharge: HOME | End: 2020-03-27
Attending: INTERNAL MEDICINE
Payer: MEDICAID

## 2020-03-27 VITALS
RESPIRATION RATE: 18 BRPM | DIASTOLIC BLOOD PRESSURE: 81 MMHG | SYSTOLIC BLOOD PRESSURE: 111 MMHG | HEIGHT: 62 IN | BODY MASS INDEX: 24.29 KG/M2 | WEIGHT: 132 LBS | HEART RATE: 105 BPM | OXYGEN SATURATION: 100 %

## 2020-03-27 DIAGNOSIS — R18.8 OTHER ASCITES: ICD-10-CM

## 2020-03-27 LAB
APPEARANCE FLD: NORMAL
BODY FLD TYPE: NORMAL
COLOR FLD: COLORLESS
LYMPHOCYTES NFR FLD MANUAL: 22 %
MONOS+MACROS NFR FLD MANUAL: 76 %
NEUTROPHILS NFR FLD MANUAL: 2 %
WBC # FLD: 101 /CU MM

## 2020-03-27 PROCEDURE — 89051 BODY FLUID CELL COUNT: CPT

## 2020-03-27 PROCEDURE — 49083 ABD PARACENTESIS W/IMAGING: CPT

## 2020-03-27 PROCEDURE — 87075 CULTR BACTERIA EXCEPT BLOOD: CPT

## 2020-03-27 PROCEDURE — 87070 CULTURE OTHR SPECIMN AEROBIC: CPT

## 2020-03-27 PROCEDURE — 87205 SMEAR GRAM STAIN: CPT

## 2020-03-27 NOTE — DISCHARGE SUMMARY
Pre Op Diagnosis: ascites     Post Op Diagnosis: same     Procedure:  para     Procedure performed by: Nigel MONTENEGRO, Jessica ONTIVEROS     Written Informed Consent Obtained: Yes     Specimen Removed:  yes     Estimated Blood Loss:  minimal     Findings: Local anesthesia.     The patient tolerated the procedure well and there were no complications.      Sterile technique was performed in the right abdomen, lidocaine was used as a local anesthetic.   3.5 liters of colette fluid removed for therapeutic and diagnositc purposes.  Pt tolerated the procedure well without immediate complications.  Please see radiologist report for details. F/u with PCP and/or ordering physician.

## 2020-03-27 NOTE — PLAN OF CARE
3.5 liters of light colette fluid removed during paracentesis. Pt tolerated procedure well. VSS. No acute distress noted. Discharge instructions given to and reviewed with pt and verbalized understanding of all. Pt set up next paracentesis apt at this time. Pt discharged home, taken out via wheelchair, and driven home by friend.

## 2020-03-27 NOTE — DISCHARGE INSTRUCTIONS

## 2020-03-27 NOTE — TELEPHONE ENCOUNTER
Spoke with patient and made her aware to hold for now. Patient verbalized understanding with no concerns. Patient to keep appointment on 04/08/2020 as scheduled. Patient travels from [place], [place] and has labs scheduled as well.

## 2020-03-30 LAB — PATH INTERP FLD-IMP: NORMAL

## 2020-03-31 LAB
BACTERIA FLD AEROBE CULT: NO GROWTH
GRAM STN SPEC: NORMAL
GRAM STN SPEC: NORMAL

## 2020-04-03 ENCOUNTER — HOSPITAL ENCOUNTER (OUTPATIENT)
Dept: RADIOLOGY | Facility: HOSPITAL | Age: 60
Discharge: HOME | End: 2020-04-03
Attending: INTERNAL MEDICINE
Payer: MEDICAID

## 2020-04-03 VITALS
OXYGEN SATURATION: 100 % | RESPIRATION RATE: 18 BRPM | DIASTOLIC BLOOD PRESSURE: 83 MMHG | HEART RATE: 103 BPM | BODY MASS INDEX: 24.84 KG/M2 | WEIGHT: 135 LBS | HEIGHT: 62 IN | SYSTOLIC BLOOD PRESSURE: 146 MMHG

## 2020-04-03 DIAGNOSIS — R18.8 OTHER ASCITES: ICD-10-CM

## 2020-04-03 LAB
APPEARANCE FLD: NORMAL
BACTERIA SPEC ANAEROBE CULT: NORMAL
BODY FLD TYPE: NORMAL
COLOR FLD: YELLOW
LYMPHOCYTES NFR FLD MANUAL: 26 %
MONOS+MACROS NFR FLD MANUAL: 72 %
NEUTROPHILS NFR FLD MANUAL: 2 %
WBC # FLD: 56 /CU MM

## 2020-04-03 PROCEDURE — 87070 CULTURE OTHR SPECIMN AEROBIC: CPT

## 2020-04-03 PROCEDURE — 63600175 PHARM REV CODE 636 W HCPCS: Mod: JG | Performed by: INTERNAL MEDICINE

## 2020-04-03 PROCEDURE — 87205 SMEAR GRAM STAIN: CPT

## 2020-04-03 PROCEDURE — P9047 ALBUMIN (HUMAN), 25%, 50ML: HCPCS | Mod: JG | Performed by: INTERNAL MEDICINE

## 2020-04-03 PROCEDURE — 49083 ABD PARACENTESIS W/IMAGING: CPT

## 2020-04-03 PROCEDURE — 87075 CULTR BACTERIA EXCEPT BLOOD: CPT

## 2020-04-03 PROCEDURE — 89051 BODY FLUID CELL COUNT: CPT

## 2020-04-03 RX ORDER — ALBUMIN HUMAN 250 G/1000ML
50 SOLUTION INTRAVENOUS ONCE
Status: COMPLETED | OUTPATIENT
Start: 2020-04-03 | End: 2020-04-03

## 2020-04-03 RX ADMIN — ALBUMIN (HUMAN) 50 G: 25 SOLUTION INTRAVENOUS at 02:04

## 2020-04-03 NOTE — PLAN OF CARE
Discharge papers given to and reviewed with pt and pt verbalized understanding of all. Pt scheduled next apt for paracentesis. 6 liters of light colette colored fluid removed during paracentesis. Pt discharged to home, taken out via wheelchair, and driven home by friend.

## 2020-04-03 NOTE — DISCHARGE INSTRUCTIONS

## 2020-04-03 NOTE — DISCHARGE SUMMARY
Pre Op Diagnosis: ascites     Post Op Diagnosis: same     Procedure:  para     Procedure performed by: Ameya MONTENEGRO, Herbert ONTIVEROS     Written Informed Consent Obtained: Yes     Specimen Removed:  yes     Estimated Blood Loss:  minimal     Findings: Local anesthesia.     The patient tolerated the procedure well and there were no complications.      Sterile technique was performed in the RLQ, lidocaine was used as a local anesthetic.   6 liters of colette fluid removed for therapeutic purposes.  Pt tolerated the procedure well without immediate complications.  Please see radiologist report for details. F/u with PCP and/or ordering physician.

## 2020-04-06 LAB — PATH INTERP FLD-IMP: NORMAL

## 2020-04-08 ENCOUNTER — OFFICE VISIT (OUTPATIENT)
Dept: GASTROENTEROLOGY | Facility: CLINIC | Age: 60
End: 2020-04-08
Payer: MEDICAID

## 2020-04-08 ENCOUNTER — LAB VISIT (OUTPATIENT)
Dept: LAB | Facility: HOSPITAL | Age: 60
End: 2020-04-08
Attending: INTERNAL MEDICINE
Payer: MEDICAID

## 2020-04-08 ENCOUNTER — TELEPHONE (OUTPATIENT)
Dept: TRANSPLANT | Facility: CLINIC | Age: 60
End: 2020-04-08

## 2020-04-08 VITALS
WEIGHT: 136.69 LBS | HEIGHT: 62 IN | DIASTOLIC BLOOD PRESSURE: 78 MMHG | HEART RATE: 112 BPM | BODY MASS INDEX: 25.15 KG/M2 | SYSTOLIC BLOOD PRESSURE: 136 MMHG

## 2020-04-08 DIAGNOSIS — R18.8 OTHER ASCITES: ICD-10-CM

## 2020-04-08 DIAGNOSIS — B19.20 DECOMPENSATED HCV CIRRHOSIS: Primary | ICD-10-CM

## 2020-04-08 DIAGNOSIS — K74.69 DECOMPENSATED HCV CIRRHOSIS: Primary | ICD-10-CM

## 2020-04-08 DIAGNOSIS — R11.0 NAUSEA: ICD-10-CM

## 2020-04-08 DIAGNOSIS — K74.69 OTHER CIRRHOSIS OF LIVER: ICD-10-CM

## 2020-04-08 DIAGNOSIS — R18.8 OTHER ASCITES: Primary | ICD-10-CM

## 2020-04-08 DIAGNOSIS — B18.2 CHRONIC HEPATITIS C WITHOUT HEPATIC COMA: ICD-10-CM

## 2020-04-08 LAB
ALBUMIN SERPL BCP-MCNC: 2.8 G/DL (ref 3.5–5.2)
ALP SERPL-CCNC: 227 U/L (ref 55–135)
ALT SERPL W/O P-5'-P-CCNC: 32 U/L (ref 10–44)
ANION GAP SERPL CALC-SCNC: 8 MMOL/L (ref 8–16)
AST SERPL-CCNC: 62 U/L (ref 10–40)
BACTERIA FLD AEROBE CULT: NO GROWTH
BASOPHILS # BLD AUTO: 0.02 K/UL (ref 0–0.2)
BASOPHILS NFR BLD: 0.6 % (ref 0–1.9)
BILIRUB SERPL-MCNC: 1.5 MG/DL (ref 0.1–1)
BUN SERPL-MCNC: 8 MG/DL (ref 8–23)
CALCIUM SERPL-MCNC: 8.1 MG/DL (ref 8.7–10.5)
CHLORIDE SERPL-SCNC: 101 MMOL/L (ref 95–110)
CO2 SERPL-SCNC: 28 MMOL/L (ref 23–29)
CREAT SERPL-MCNC: 0.9 MG/DL (ref 0.5–1.4)
DIFFERENTIAL METHOD: ABNORMAL
EOSINOPHIL # BLD AUTO: 0.2 K/UL (ref 0–0.5)
EOSINOPHIL NFR BLD: 4.8 % (ref 0–8)
ERYTHROCYTE [DISTWIDTH] IN BLOOD BY AUTOMATED COUNT: 15.6 % (ref 11.5–14.5)
EST. GFR  (AFRICAN AMERICAN): >60 ML/MIN/1.73 M^2
EST. GFR  (NON AFRICAN AMERICAN): >60 ML/MIN/1.73 M^2
GLUCOSE SERPL-MCNC: 143 MG/DL (ref 70–110)
GRAM STN SPEC: NORMAL
GRAM STN SPEC: NORMAL
HCT VFR BLD AUTO: 34.5 % (ref 37–48.5)
HGB BLD-MCNC: 10.8 G/DL (ref 12–16)
IMM GRANULOCYTES # BLD AUTO: 0.03 K/UL (ref 0–0.04)
IMM GRANULOCYTES NFR BLD AUTO: 0.9 % (ref 0–0.5)
INR PPP: 1.3 (ref 0.8–1.2)
LYMPHOCYTES # BLD AUTO: 0.6 K/UL (ref 1–4.8)
LYMPHOCYTES NFR BLD: 15.7 % (ref 18–48)
MCH RBC QN AUTO: 31.3 PG (ref 27–31)
MCHC RBC AUTO-ENTMCNC: 31.3 G/DL (ref 32–36)
MCV RBC AUTO: 100 FL (ref 82–98)
MONOCYTES # BLD AUTO: 0.5 K/UL (ref 0.3–1)
MONOCYTES NFR BLD: 13.4 % (ref 4–15)
NEUTROPHILS # BLD AUTO: 2.3 K/UL (ref 1.8–7.7)
NEUTROPHILS NFR BLD: 64.6 % (ref 38–73)
NRBC BLD-RTO: 0 /100 WBC
PLATELET # BLD AUTO: 84 K/UL (ref 150–350)
PMV BLD AUTO: 12.1 FL (ref 9.2–12.9)
POTASSIUM SERPL-SCNC: 3.7 MMOL/L (ref 3.5–5.1)
PROT SERPL-MCNC: 7.2 G/DL (ref 6–8.4)
PROTHROMBIN TIME: 14.2 SEC (ref 9–12.5)
RBC # BLD AUTO: 3.45 M/UL (ref 4–5.4)
SODIUM SERPL-SCNC: 137 MMOL/L (ref 136–145)
WBC # BLD AUTO: 3.51 K/UL (ref 3.9–12.7)

## 2020-04-08 PROCEDURE — 99213 OFFICE O/P EST LOW 20 MIN: CPT | Mod: PBBFAC | Performed by: INTERNAL MEDICINE

## 2020-04-08 PROCEDURE — 99999 PR PBB SHADOW E&M-EST. PATIENT-LVL III: ICD-10-PCS | Mod: PBBFAC,,, | Performed by: INTERNAL MEDICINE

## 2020-04-08 PROCEDURE — 85610 PROTHROMBIN TIME: CPT

## 2020-04-08 PROCEDURE — 85025 COMPLETE CBC W/AUTO DIFF WBC: CPT

## 2020-04-08 PROCEDURE — 99214 OFFICE O/P EST MOD 30 MIN: CPT | Mod: S$PBB,,, | Performed by: INTERNAL MEDICINE

## 2020-04-08 PROCEDURE — 36415 COLL VENOUS BLD VENIPUNCTURE: CPT

## 2020-04-08 PROCEDURE — 99214 PR OFFICE/OUTPT VISIT, EST, LEVL IV, 30-39 MIN: ICD-10-PCS | Mod: S$PBB,,, | Performed by: INTERNAL MEDICINE

## 2020-04-08 PROCEDURE — 99999 PR PBB SHADOW E&M-EST. PATIENT-LVL III: CPT | Mod: PBBFAC,,, | Performed by: INTERNAL MEDICINE

## 2020-04-08 PROCEDURE — 80053 COMPREHEN METABOLIC PANEL: CPT

## 2020-04-08 RX ORDER — ONDANSETRON 4 MG/1
4 TABLET, ORALLY DISINTEGRATING ORAL EVERY 8 HOURS PRN
Qty: 30 TABLET | Refills: 1 | Status: SHIPPED | OUTPATIENT
Start: 2020-04-08 | End: 2020-05-11 | Stop reason: SDUPTHER

## 2020-04-08 RX ORDER — SPIRONOLACTONE 100 MG/1
100 TABLET, FILM COATED ORAL 2 TIMES DAILY
Qty: 60 TABLET | Refills: 5 | Status: SHIPPED | OUTPATIENT
Start: 2020-04-08 | End: 2020-10-05

## 2020-04-08 RX ORDER — ONDANSETRON 4 MG/1
TABLET, FILM COATED ORAL
Status: ON HOLD | COMMUNITY
Start: 2020-01-27 | End: 2020-04-23 | Stop reason: HOSPADM

## 2020-04-08 RX ORDER — METOPROLOL TARTRATE 25 MG/1
TABLET, FILM COATED ORAL
Status: ON HOLD | COMMUNITY
Start: 2020-02-28 | End: 2020-04-23 | Stop reason: SDUPTHER

## 2020-04-08 RX ORDER — CIPROFLOXACIN 500 MG/1
500 TABLET ORAL DAILY
Qty: 90 TABLET | Refills: 2 | Status: ON HOLD | OUTPATIENT
Start: 2020-04-08 | End: 2020-04-23 | Stop reason: SDUPTHER

## 2020-04-08 NOTE — H&P (VIEW-ONLY)
Subjective:     Jossy Siegel is here for follow up of cirrhosis.    HPI  Since Jossy Siegel's last visit the main issues have been:    Ascites-continues to need weekly paracenteses, with abdominal distention at this time  Encephalopathy-none, she has been unable to get a rifaximin refilled.  She is taking her lactulose without any issues  GI bleeding-none    Review of Systems   Constitutional: Positive for activity change and fatigue.   HENT: Negative.    Eyes: Negative.    Respiratory: Negative.    Cardiovascular: Positive for leg swelling.   Gastrointestinal: Positive for abdominal distention and nausea.   Genitourinary: Negative.    Musculoskeletal: Positive for arthralgias and myalgias.   Skin: Negative.    Neurological: Negative.    Psychiatric/Behavioral: Negative.        Objective:     Physical Exam   Constitutional: She is oriented to person, place, and time. No distress.   Thin with muscle wasting   HENT:   Head: Normocephalic and atraumatic.   Mouth/Throat: Oropharynx is clear and moist. No oropharyngeal exudate.   Eyes: Pupils are equal, round, and reactive to light. Conjunctivae are normal. Right eye exhibits no discharge. Left eye exhibits no discharge. No scleral icterus.   Pulmonary/Chest: Effort normal and breath sounds normal. No respiratory distress. She has no wheezes.   Abdominal: Soft. She exhibits distension (Large ascites). There is no tenderness.   Musculoskeletal: She exhibits edema ( 2+ lower extremity edema).   Neurological: She is alert and oriented to person, place, and time.   Psychiatric: She has a normal mood and affect. Her behavior is normal.   Vitals reviewed.      Computed MELD-Na score unavailable. Necessary lab results were not found in the last year.  Computed MELD score unavailable. Necessary lab results were not found in the last year.    WBC   Date Value Ref Range Status   03/19/2020 4.10 3.90 - 12.70 K/uL Final     Hemoglobin   Date Value Ref Range Status    03/19/2020 11.4 (L) 12.0 - 16.0 g/dL Final     Hematocrit   Date Value Ref Range Status   03/19/2020 34.9 (L) 37.0 - 48.5 % Final     Platelets   Date Value Ref Range Status   03/19/2020 129 (L) 150 - 350 K/uL Final     BUN, Bld   Date Value Ref Range Status   02/12/2020 8 8 - 23 mg/dL Final     Creatinine   Date Value Ref Range Status   02/12/2020 0.9 0.5 - 1.4 mg/dL Final     Glucose   Date Value Ref Range Status   02/12/2020 90 70 - 110 mg/dL Final     Calcium   Date Value Ref Range Status   02/12/2020 8.2 (L) 8.7 - 10.5 mg/dL Final     Sodium   Date Value Ref Range Status   02/12/2020 136 136 - 145 mmol/L Final     Potassium   Date Value Ref Range Status   02/12/2020 3.9 3.5 - 5.1 mmol/L Final     Chloride   Date Value Ref Range Status   02/12/2020 100 95 - 110 mmol/L Final     AST   Date Value Ref Range Status   02/12/2020 54 (H) 10 - 40 U/L Final     ALT   Date Value Ref Range Status   02/12/2020 26 10 - 44 U/L Final     Alkaline Phosphatase   Date Value Ref Range Status   02/12/2020 127 55 - 135 U/L Final     Total Bilirubin   Date Value Ref Range Status   02/12/2020 1.2 (H) 0.1 - 1.0 mg/dL Final     Comment:     For infants and newborns, interpretation of results should be based  on gestational age, weight and in agreement with clinical  observations.  Premature Infant recommended reference ranges:  Up to 24 hours.............<8.0 mg/dL  Up to 48 hours............<12.0 mg/dL  3-5 days..................<15.0 mg/dL  6-29 days.................<15.0 mg/dL       Albumin   Date Value Ref Range Status   02/12/2020 3.0 (L) 3.5 - 5.2 g/dL Final     INR   Date Value Ref Range Status   04/08/2020 1.3 (H) 0.8 - 1.2 Final     Comment:     Coumadin Therapy:  2.0 - 3.0 for INR for all indicators except mechanical heart valves  and antiphospholipid syndromes which should use 2.5 - 3.5.           Assessment/Plan:     1. Decompensated HCV cirrhosis    2. Other ascites    3. Chronic hepatitis C without hepatic coma    4.  Nausea      Jossy Siegel is a 59 y.o. female withCirrhosis    Decompensated HCV cirrhosis-decompensated meld score tends to be low.  Awaiting meld labs from today.  Transplant evaluations on low meld patient is on hold right now secondary to COVID but once think stabilized will consider send the patient for transplant evaluation given concerned about her overall prognosis with muscle wasting, malnutrition and refractory ascites.  -     Comprehensive metabolic panel; Future; Expected date: 04/08/2020  -     CBC auto differential; Future; Expected date: 04/08/2020  -     Protime-INR; Future; Expected date: 04/08/2020  -     AFP tumor marker; Future; Expected date: 04/08/2020  -     US Abdomen Limited; Future; Expected date: 04/08/2020    Other ascites-uncontrolled  -awaiting labs from today  -if labs okay would like to increase spironolactone to 100 mg twice a day  -continue ciprofloxacin for SBP prophylaxis since patient is getting weekly paracenteses  -     Comprehensive metabolic panel; Future; Expected date: 04/08/2020  -     ciprofloxacin HCl (CIPRO) 500 MG tablet; Take 1 tablet (500 mg total) by mouth once daily.  Dispense: 90 tablet; Refill: 2    Chronic hepatitis C without hepatic coma  -hold on hepatitis C treatment given level decompensation    Nausea  -     ondansetron (ZOFRAN-ODT) 4 MG TbDL; Take 1 tablet (4 mg total) by mouth every 8 (eight) hours as needed (nausea).  Dispense: 30 tablet; Refill: 1    Return to clinic in 8 weeks with preclinic labs and imaging      Alma Delia Green MD

## 2020-04-08 NOTE — TELEPHONE ENCOUNTER
Patient: Jossy Siegel       MRN: [id number]      : 1960     Age: 59 y.o.  [address] [facility] [address]    Provider: Hepatologist Yogi Green    Urgency of review: non-urgent    Patient Transplant Status: Other Initial eval    Reason for presentation: Initial staging for transplant    Clinical Summary: 59F w/ cirrhosis has liver lesion concerning for HCC and AFP >1000    Imaging to be reviewed:     HCC Treatment History: none    ABO: n/a  Platelets:   Lab Results   Component Value Date/Time     (L) 2020 01:35 PM     Creatinine:   Lab Results   Component Value Date/Time    CREATININE 0.9 2020 05:32 PM     Bilirubin:   Lab Results   Component Value Date/Time    BILITOT 1.2 (H) 2020 05:32 PM     AFP Last 3 each if available:   Lab Results   Component Value Date/Time    AFP 9.5 (H) 2020 05:32 PM       MELD: Computed MELD-Na score unavailable. Necessary lab results were not found in the last year.  Computed MELD score unavailable. Necessary lab results were not found in the last year.    Plan:     Follow-up Provider:

## 2020-04-09 ENCOUNTER — HOSPITAL ENCOUNTER (OUTPATIENT)
Dept: RADIOLOGY | Facility: HOSPITAL | Age: 60
Discharge: HOME | End: 2020-04-09
Attending: INTERNAL MEDICINE
Payer: MEDICAID

## 2020-04-09 VITALS
HEIGHT: 62 IN | SYSTOLIC BLOOD PRESSURE: 146 MMHG | HEART RATE: 74 BPM | DIASTOLIC BLOOD PRESSURE: 76 MMHG | RESPIRATION RATE: 16 BRPM | WEIGHT: 131 LBS | BODY MASS INDEX: 24.11 KG/M2 | OXYGEN SATURATION: 100 %

## 2020-04-09 DIAGNOSIS — R18.8 OTHER ASCITES: ICD-10-CM

## 2020-04-09 LAB
APPEARANCE FLD: NORMAL
BODY FLD TYPE: NORMAL
COLOR FLD: YELLOW
LYMPHOCYTES NFR FLD MANUAL: 23 %
MESOTHL CELL NFR FLD MANUAL: 3 %
MONOS+MACROS NFR FLD MANUAL: 68 %
NEUTROPHILS NFR FLD MANUAL: 6 %
WBC # FLD: 70 /CU MM

## 2020-04-09 PROCEDURE — 49083 ABD PARACENTESIS W/IMAGING: CPT

## 2020-04-09 PROCEDURE — 89051 BODY FLUID CELL COUNT: CPT

## 2020-04-09 PROCEDURE — 87075 CULTR BACTERIA EXCEPT BLOOD: CPT

## 2020-04-09 PROCEDURE — 87070 CULTURE OTHR SPECIMN AEROBIC: CPT

## 2020-04-09 PROCEDURE — 87205 SMEAR GRAM STAIN: CPT

## 2020-04-09 NOTE — PLAN OF CARE
5.25 liters of colette fluid removed from R side abdomen punctuer site, site WDL. Pt d/c home in stable condition with ride.  Verbalized understanding of d/c instructions, handout given.  Pt voiced no complaints at this time.  Future para appointment made per pt request.

## 2020-04-09 NOTE — DISCHARGE INSTRUCTIONS

## 2020-04-09 NOTE — DISCHARGE SUMMARY
Pre Op Diagnosis: ascites     Post Op Diagnosis: same     Procedure:  para     Procedure performed by: Ameya MONTENEGRO, Herbert ONTIVEROS     Written Informed Consent Obtained: Yes     Specimen Removed:  yes     Estimated Blood Loss:  minimal     Findings: Local anesthesia.     The patient tolerated the procedure well and there were no complications.      Sterile technique was performed in the lower abdomen, lidocaine was used as a local anesthetic.   5.25 liters of colette fluid removed for therapeutic and diagnostic purposes.  Pt tolerated the procedure well without immediate complications.  Please see radiologist report for details. F/u with PCP and/or ordering physician.

## 2020-04-10 LAB — PATH INTERP FLD-IMP: NORMAL

## 2020-04-13 LAB
BACTERIA FLD AEROBE CULT: NO GROWTH
BACTERIA SPEC ANAEROBE CULT: NORMAL
GRAM STN SPEC: NORMAL
GRAM STN SPEC: NORMAL

## 2020-04-15 ENCOUNTER — TELEPHONE (OUTPATIENT)
Dept: RADIOLOGY | Facility: HOSPITAL | Age: 60
End: 2020-04-15

## 2020-04-15 DIAGNOSIS — R18.8 OTHER ASCITES: Primary | ICD-10-CM

## 2020-04-16 ENCOUNTER — HOSPITAL ENCOUNTER (OUTPATIENT)
Dept: RADIOLOGY | Facility: HOSPITAL | Age: 60
Discharge: HOME | End: 2020-04-16
Attending: INTERNAL MEDICINE
Payer: MEDICAID

## 2020-04-16 VITALS
HEART RATE: 107 BPM | DIASTOLIC BLOOD PRESSURE: 69 MMHG | OXYGEN SATURATION: 100 % | RESPIRATION RATE: 16 BRPM | SYSTOLIC BLOOD PRESSURE: 120 MMHG | WEIGHT: 130 LBS | BODY MASS INDEX: 23.92 KG/M2 | HEIGHT: 62 IN

## 2020-04-16 DIAGNOSIS — R18.8 OTHER ASCITES: Primary | ICD-10-CM

## 2020-04-16 DIAGNOSIS — R18.8 OTHER ASCITES: ICD-10-CM

## 2020-04-16 LAB
APPEARANCE FLD: NORMAL
BACTERIA SPEC ANAEROBE CULT: NORMAL
BODY FLD TYPE: NORMAL
COLOR FLD: YELLOW
LYMPHOCYTES NFR FLD MANUAL: 11 %
MONOS+MACROS NFR FLD MANUAL: 86 %
NEUTROPHILS NFR FLD MANUAL: 3 %
WBC # FLD: 66 /CU MM

## 2020-04-16 PROCEDURE — 87070 CULTURE OTHR SPECIMN AEROBIC: CPT

## 2020-04-16 PROCEDURE — 89051 BODY FLUID CELL COUNT: CPT

## 2020-04-16 PROCEDURE — 87075 CULTR BACTERIA EXCEPT BLOOD: CPT

## 2020-04-16 PROCEDURE — 87205 SMEAR GRAM STAIN: CPT

## 2020-04-16 PROCEDURE — 49083 ABD PARACENTESIS W/IMAGING: CPT

## 2020-04-16 NOTE — PLAN OF CARE
Procedure and recovery complete complete.  Patient tolerated well.  VSS.  4 Liters of colette fluid removed and samples sent to lab.  Band Aid to puncture site C/D/I.  Wheeled patient to front of hospital to meet ride.

## 2020-04-16 NOTE — DISCHARGE INSTRUCTIONS

## 2020-04-16 NOTE — DISCHARGE SUMMARY
Pre Op Diagnosis: ascites     Post Op Diagnosis: same     Procedure:  para     Procedure performed by: Nigel MONTENEGRO, Jessica ONTIVEROS     Written Informed Consent Obtained: Yes     Specimen Removed:  yes     Estimated Blood Loss:  minimal     Findings: Local anesthesia.     The patient tolerated the procedure well and there were no complications.      Sterile technique was performed in the RLQ, lidocaine was used as a local anesthetic.   4 liters of colette fluid removed for therapeutic and diagnostic purposes.  Pt tolerated the procedure well without immediate complications.  Please see radiologist report for details. F/u with PCP and/or ordering physician.

## 2020-04-17 LAB — PATH INTERP FLD-IMP: NORMAL

## 2020-04-21 ENCOUNTER — HOSPITAL ENCOUNTER (INPATIENT)
Facility: HOSPITAL | Age: 60
LOS: 2 days | Discharge: HOME | DRG: 442 | End: 2020-04-23
Attending: FAMILY MEDICINE | Admitting: INTERNAL MEDICINE
Payer: MEDICAID

## 2020-04-21 DIAGNOSIS — D61.818 PANCYTOPENIA: Chronic | ICD-10-CM

## 2020-04-21 DIAGNOSIS — K76.82 HEPATIC ENCEPHALOPATHY: Primary | ICD-10-CM

## 2020-04-21 DIAGNOSIS — M54.50 CHRONIC BILATERAL LOW BACK PAIN WITHOUT SCIATICA: Chronic | ICD-10-CM

## 2020-04-21 DIAGNOSIS — R18.8 OTHER ASCITES: ICD-10-CM

## 2020-04-21 DIAGNOSIS — Z20.822 SUSPECTED COVID-19 VIRUS INFECTION: ICD-10-CM

## 2020-04-21 DIAGNOSIS — R14.0 ABDOMINAL DISTENTION: ICD-10-CM

## 2020-04-21 DIAGNOSIS — G89.29 CHRONIC BILATERAL LOW BACK PAIN WITHOUT SCIATICA: Chronic | ICD-10-CM

## 2020-04-21 LAB
BACTERIA FLD AEROBE CULT: NO GROWTH
GRAM STN SPEC: NORMAL
GRAM STN SPEC: NORMAL

## 2020-04-21 PROCEDURE — 80329 ANALGESICS NON-OPIOID 1 OR 2: CPT

## 2020-04-21 PROCEDURE — 82550 ASSAY OF CK (CPK): CPT

## 2020-04-21 PROCEDURE — 85025 COMPLETE CBC W/AUTO DIFF WBC: CPT

## 2020-04-21 PROCEDURE — 85730 THROMBOPLASTIN TIME PARTIAL: CPT

## 2020-04-21 PROCEDURE — 82728 ASSAY OF FERRITIN: CPT

## 2020-04-21 PROCEDURE — 83605 ASSAY OF LACTIC ACID: CPT

## 2020-04-21 PROCEDURE — 82140 ASSAY OF AMMONIA: CPT

## 2020-04-21 PROCEDURE — 85610 PROTHROMBIN TIME: CPT

## 2020-04-21 PROCEDURE — 99291 CRITICAL CARE FIRST HOUR: CPT | Mod: 25

## 2020-04-21 PROCEDURE — 83615 LACTATE (LD) (LDH) ENZYME: CPT

## 2020-04-21 PROCEDURE — 86140 C-REACTIVE PROTEIN: CPT

## 2020-04-21 PROCEDURE — 83735 ASSAY OF MAGNESIUM: CPT

## 2020-04-21 PROCEDURE — 80053 COMPREHEN METABOLIC PANEL: CPT

## 2020-04-21 PROCEDURE — 84484 ASSAY OF TROPONIN QUANT: CPT

## 2020-04-21 PROCEDURE — 84145 PROCALCITONIN (PCT): CPT

## 2020-04-21 PROCEDURE — 80320 DRUG SCREEN QUANTALCOHOLS: CPT

## 2020-04-21 PROCEDURE — 11000001 HC ACUTE MED/SURG PRIVATE ROOM

## 2020-04-21 PROCEDURE — 84443 ASSAY THYROID STIM HORMONE: CPT

## 2020-04-22 PROBLEM — R18.8 CIRRHOSIS OF LIVER WITH ASCITES: Status: ACTIVE | Noted: 2020-02-17

## 2020-04-22 PROBLEM — K74.60 CIRRHOSIS OF LIVER WITH ASCITES: Chronic | Status: ACTIVE | Noted: 2020-02-17

## 2020-04-22 PROBLEM — K76.82 HEPATIC ENCEPHALOPATHY: Status: ACTIVE | Noted: 2020-04-22

## 2020-04-22 PROBLEM — K74.60 CIRRHOSIS OF LIVER WITH ASCITES: Status: ACTIVE | Noted: 2020-02-17

## 2020-04-22 PROBLEM — R18.8 CIRRHOSIS OF LIVER WITH ASCITES: Chronic | Status: ACTIVE | Noted: 2020-02-17

## 2020-04-22 PROBLEM — Z72.0 TOBACCO ABUSE: Status: ACTIVE | Noted: 2020-04-22

## 2020-04-22 PROBLEM — D69.6 THROMBOCYTOPENIA: Status: ACTIVE | Noted: 2020-02-05

## 2020-04-22 PROBLEM — Z86.010 HISTORY OF COLONIC POLYPS: Status: ACTIVE | Noted: 2019-09-24

## 2020-04-22 PROBLEM — K65.2 SBP (SPONTANEOUS BACTERIAL PERITONITIS): Status: ACTIVE | Noted: 2020-01-26

## 2020-04-22 PROBLEM — F15.10 METHAMPHETAMINE USE: Status: ACTIVE | Noted: 2020-04-22

## 2020-04-22 LAB
ALBUMIN SERPL BCP-MCNC: 2.6 G/DL (ref 3.5–5.2)
ALBUMIN SERPL BCP-MCNC: 2.6 G/DL (ref 3.5–5.2)
ALP SERPL-CCNC: 145 U/L (ref 55–135)
ALP SERPL-CCNC: 159 U/L (ref 55–135)
ALT SERPL W/O P-5'-P-CCNC: 35 U/L (ref 10–44)
ALT SERPL W/O P-5'-P-CCNC: 35 U/L (ref 10–44)
AMMONIA PLAS-SCNC: 205 UMOL/L (ref 10–50)
AMPHET+METHAMPHET UR QL: NORMAL
ANION GAP SERPL CALC-SCNC: 12 MMOL/L (ref 8–16)
ANION GAP SERPL CALC-SCNC: 7 MMOL/L (ref 8–16)
APAP SERPL-MCNC: <3 UG/ML (ref 10–20)
APTT BLDCRRT: 28.9 SEC (ref 21–32)
AST SERPL-CCNC: 54 U/L (ref 10–40)
AST SERPL-CCNC: 57 U/L (ref 10–40)
BARBITURATES UR QL SCN>200 NG/ML: NEGATIVE
BASOPHILS # BLD AUTO: 0.02 K/UL (ref 0–0.2)
BASOPHILS # BLD AUTO: 0.03 K/UL (ref 0–0.2)
BASOPHILS NFR BLD: 0.8 % (ref 0–1.9)
BASOPHILS NFR BLD: 1.1 % (ref 0–1.9)
BENZODIAZ UR QL SCN>200 NG/ML: NEGATIVE
BILIRUB SERPL-MCNC: 1.5 MG/DL (ref 0.1–1)
BILIRUB SERPL-MCNC: 2 MG/DL (ref 0.1–1)
BILIRUB UR QL STRIP: NEGATIVE
BUN SERPL-MCNC: 11 MG/DL (ref 8–23)
BUN SERPL-MCNC: 12 MG/DL (ref 8–23)
BZE UR QL SCN: NEGATIVE
CALCIUM SERPL-MCNC: 8 MG/DL (ref 8.7–10.5)
CALCIUM SERPL-MCNC: 8.1 MG/DL (ref 8.7–10.5)
CANNABINOIDS UR QL SCN: NEGATIVE
CHLORIDE SERPL-SCNC: 106 MMOL/L (ref 95–110)
CHLORIDE SERPL-SCNC: 107 MMOL/L (ref 95–110)
CK SERPL-CCNC: 70 U/L (ref 20–180)
CLARITY UR: CLEAR
CO2 SERPL-SCNC: 21 MMOL/L (ref 23–29)
CO2 SERPL-SCNC: 24 MMOL/L (ref 23–29)
COLOR UR: YELLOW
CREAT SERPL-MCNC: 0.8 MG/DL (ref 0.5–1.4)
CREAT SERPL-MCNC: 0.9 MG/DL (ref 0.5–1.4)
CREAT UR-MCNC: 108.8 MG/DL (ref 15–325)
CRP SERPL-MCNC: 11.1 MG/L (ref 0–8.2)
DIFFERENTIAL METHOD: ABNORMAL
DIFFERENTIAL METHOD: ABNORMAL
EOSINOPHIL # BLD AUTO: 0 K/UL (ref 0–0.5)
EOSINOPHIL # BLD AUTO: 0.1 K/UL (ref 0–0.5)
EOSINOPHIL NFR BLD: 1.7 % (ref 0–8)
EOSINOPHIL NFR BLD: 4.1 % (ref 0–8)
ERYTHROCYTE [DISTWIDTH] IN BLOOD BY AUTOMATED COUNT: 15.5 % (ref 11.5–14.5)
ERYTHROCYTE [DISTWIDTH] IN BLOOD BY AUTOMATED COUNT: 15.7 % (ref 11.5–14.5)
EST. GFR  (AFRICAN AMERICAN): >60 ML/MIN/1.73 M^2
EST. GFR  (AFRICAN AMERICAN): >60 ML/MIN/1.73 M^2
EST. GFR  (NON AFRICAN AMERICAN): >60 ML/MIN/1.73 M^2
EST. GFR  (NON AFRICAN AMERICAN): >60 ML/MIN/1.73 M^2
ETHANOL SERPL-MCNC: <10 MG/DL
FERRITIN SERPL-MCNC: 138 NG/ML (ref 20–300)
GLUCOSE SERPL-MCNC: 108 MG/DL (ref 70–110)
GLUCOSE SERPL-MCNC: 116 MG/DL (ref 70–110)
GLUCOSE UR QL STRIP: NEGATIVE
HCT VFR BLD AUTO: 32.9 % (ref 37–48.5)
HCT VFR BLD AUTO: 33.2 % (ref 37–48.5)
HGB BLD-MCNC: 10.9 G/DL (ref 12–16)
HGB BLD-MCNC: 10.9 G/DL (ref 12–16)
HGB UR QL STRIP: NEGATIVE
IMM GRANULOCYTES # BLD AUTO: 0.02 K/UL (ref 0–0.04)
IMM GRANULOCYTES # BLD AUTO: 0.02 K/UL (ref 0–0.04)
IMM GRANULOCYTES NFR BLD AUTO: 0.7 % (ref 0–0.5)
IMM GRANULOCYTES NFR BLD AUTO: 0.8 % (ref 0–0.5)
INR PPP: 1.4 (ref 0.8–1.2)
INR PPP: 1.4 (ref 0.8–1.2)
KETONES UR QL STRIP: NEGATIVE
LACTATE SERPL-SCNC: 2.6 MMOL/L (ref 0.5–2.2)
LDH SERPL L TO P-CCNC: 299 U/L (ref 110–260)
LEUKOCYTE ESTERASE UR QL STRIP: NEGATIVE
LYMPHOCYTES # BLD AUTO: 0.4 K/UL (ref 1–4.8)
LYMPHOCYTES # BLD AUTO: 0.5 K/UL (ref 1–4.8)
LYMPHOCYTES NFR BLD: 17.8 % (ref 18–48)
LYMPHOCYTES NFR BLD: 18.1 % (ref 18–48)
MAGNESIUM SERPL-MCNC: 1.9 MG/DL (ref 1.6–2.6)
MAGNESIUM SERPL-MCNC: 1.9 MG/DL (ref 1.6–2.6)
MCH RBC QN AUTO: 31.7 PG (ref 27–31)
MCH RBC QN AUTO: 31.9 PG (ref 27–31)
MCHC RBC AUTO-ENTMCNC: 32.8 G/DL (ref 32–36)
MCHC RBC AUTO-ENTMCNC: 33.1 G/DL (ref 32–36)
MCV RBC AUTO: 96 FL (ref 82–98)
MCV RBC AUTO: 97 FL (ref 82–98)
METHADONE UR QL SCN>300 NG/ML: NEGATIVE
MONOCYTES # BLD AUTO: 0.5 K/UL (ref 0.3–1)
MONOCYTES # BLD AUTO: 0.5 K/UL (ref 0.3–1)
MONOCYTES NFR BLD: 19.9 % (ref 4–15)
MONOCYTES NFR BLD: 19.9 % (ref 4–15)
NEUTROPHILS # BLD AUTO: 1.4 K/UL (ref 1.8–7.7)
NEUTROPHILS # BLD AUTO: 1.5 K/UL (ref 1.8–7.7)
NEUTROPHILS NFR BLD: 56.1 % (ref 38–73)
NEUTROPHILS NFR BLD: 59 % (ref 38–73)
NITRITE UR QL STRIP: NEGATIVE
NRBC BLD-RTO: 0 /100 WBC
NRBC BLD-RTO: 0 /100 WBC
OPIATES UR QL SCN: NEGATIVE
PCP UR QL SCN>25 NG/ML: NEGATIVE
PH UR STRIP: 7 [PH] (ref 5–8)
PHOSPHATE SERPL-MCNC: 3.1 MG/DL (ref 2.7–4.5)
PLATELET # BLD AUTO: 71 K/UL (ref 150–350)
PLATELET # BLD AUTO: 78 K/UL (ref 150–350)
PMV BLD AUTO: 11.1 FL (ref 9.2–12.9)
PMV BLD AUTO: 11.3 FL (ref 9.2–12.9)
POTASSIUM SERPL-SCNC: 3.8 MMOL/L (ref 3.5–5.1)
POTASSIUM SERPL-SCNC: 3.9 MMOL/L (ref 3.5–5.1)
PROCALCITONIN SERPL IA-MCNC: 0.08 NG/ML
PROT SERPL-MCNC: 7.1 G/DL (ref 6–8.4)
PROT SERPL-MCNC: 7.1 G/DL (ref 6–8.4)
PROT UR QL STRIP: NEGATIVE
PROTHROMBIN TIME: 14.2 SEC (ref 9–12.5)
PROTHROMBIN TIME: 14.4 SEC (ref 9–12.5)
RBC # BLD AUTO: 3.42 M/UL (ref 4–5.4)
RBC # BLD AUTO: 3.44 M/UL (ref 4–5.4)
SARS-COV-2 RDRP RESP QL NAA+PROBE: NEGATIVE
SODIUM SERPL-SCNC: 138 MMOL/L (ref 136–145)
SODIUM SERPL-SCNC: 139 MMOL/L (ref 136–145)
SP GR UR STRIP: 1.01 (ref 1–1.03)
TOXICOLOGY INFORMATION: NORMAL
TROPONIN I SERPL DL<=0.01 NG/ML-MCNC: 0.01 NG/ML (ref 0–0.03)
TSH SERPL DL<=0.005 MIU/L-ACNC: 0.71 UIU/ML (ref 0.4–4)
URN SPEC COLLECT METH UR: ABNORMAL
UROBILINOGEN UR STRIP-ACNC: ABNORMAL EU/DL
WBC # BLD AUTO: 2.36 K/UL (ref 3.9–12.7)
WBC # BLD AUTO: 2.71 K/UL (ref 3.9–12.7)

## 2020-04-22 PROCEDURE — 83735 ASSAY OF MAGNESIUM: CPT

## 2020-04-22 PROCEDURE — 36415 COLL VENOUS BLD VENIPUNCTURE: CPT

## 2020-04-22 PROCEDURE — 25000003 PHARM REV CODE 250: Performed by: FAMILY MEDICINE

## 2020-04-22 PROCEDURE — 85610 PROTHROMBIN TIME: CPT

## 2020-04-22 PROCEDURE — 63600175 PHARM REV CODE 636 W HCPCS: Performed by: FAMILY MEDICINE

## 2020-04-22 PROCEDURE — 93010 EKG 12-LEAD: ICD-10-PCS | Mod: ,,, | Performed by: INTERNAL MEDICINE

## 2020-04-22 PROCEDURE — 87040 BLOOD CULTURE FOR BACTERIA: CPT

## 2020-04-22 PROCEDURE — 96376 TX/PRO/DX INJ SAME DRUG ADON: CPT

## 2020-04-22 PROCEDURE — 25500020 PHARM REV CODE 255: Performed by: FAMILY MEDICINE

## 2020-04-22 PROCEDURE — 96374 THER/PROPH/DIAG INJ IV PUSH: CPT

## 2020-04-22 PROCEDURE — 93005 ELECTROCARDIOGRAM TRACING: CPT

## 2020-04-22 PROCEDURE — 80053 COMPREHEN METABOLIC PANEL: CPT

## 2020-04-22 PROCEDURE — 25000003 PHARM REV CODE 250: Performed by: NURSE PRACTITIONER

## 2020-04-22 PROCEDURE — 21400001 HC TELEMETRY ROOM

## 2020-04-22 PROCEDURE — 84100 ASSAY OF PHOSPHORUS: CPT

## 2020-04-22 PROCEDURE — 80307 DRUG TEST PRSMV CHEM ANLYZR: CPT

## 2020-04-22 PROCEDURE — 85025 COMPLETE CBC W/AUTO DIFF WBC: CPT

## 2020-04-22 PROCEDURE — U0002 COVID-19 LAB TEST NON-CDC: HCPCS

## 2020-04-22 PROCEDURE — 81003 URINALYSIS AUTO W/O SCOPE: CPT | Mod: 59

## 2020-04-22 PROCEDURE — 93010 ELECTROCARDIOGRAM REPORT: CPT | Mod: ,,, | Performed by: INTERNAL MEDICINE

## 2020-04-22 RX ORDER — LACTULOSE 10 G/15ML
20 SOLUTION ORAL EVERY 6 HOURS
Status: DISCONTINUED | OUTPATIENT
Start: 2020-04-22 | End: 2020-04-23

## 2020-04-22 RX ORDER — CIPROFLOXACIN 500 MG/1
500 TABLET ORAL DAILY
Status: DISCONTINUED | OUTPATIENT
Start: 2020-04-22 | End: 2020-04-22

## 2020-04-22 RX ORDER — LACTULOSE 10 G/15ML
30 SOLUTION ORAL
Status: COMPLETED | OUTPATIENT
Start: 2020-04-22 | End: 2020-04-22

## 2020-04-22 RX ORDER — METOPROLOL TARTRATE 25 MG/1
25 TABLET, FILM COATED ORAL 2 TIMES DAILY
Status: DISCONTINUED | OUTPATIENT
Start: 2020-04-22 | End: 2020-04-23 | Stop reason: HOSPADM

## 2020-04-22 RX ORDER — LACTULOSE 10 G/15ML
20 SOLUTION ORAL EVERY 4 HOURS
Status: DISCONTINUED | OUTPATIENT
Start: 2020-04-22 | End: 2020-04-22

## 2020-04-22 RX ORDER — ONDANSETRON 2 MG/ML
4 INJECTION INTRAMUSCULAR; INTRAVENOUS EVERY 6 HOURS PRN
Status: DISCONTINUED | OUTPATIENT
Start: 2020-04-22 | End: 2020-04-22

## 2020-04-22 RX ORDER — CIPROFLOXACIN 750 MG/1
750 TABLET, FILM COATED ORAL EVERY 12 HOURS
Status: DISCONTINUED | OUTPATIENT
Start: 2020-04-22 | End: 2020-04-23 | Stop reason: HOSPADM

## 2020-04-22 RX ORDER — LACTULOSE 10 G/15ML
20 SOLUTION ORAL; RECTAL EVERY 4 HOURS
Status: DISCONTINUED | OUTPATIENT
Start: 2020-04-22 | End: 2020-04-22

## 2020-04-22 RX ORDER — PANTOPRAZOLE SODIUM 40 MG/1
40 TABLET, DELAYED RELEASE ORAL 2 TIMES DAILY
Status: DISCONTINUED | OUTPATIENT
Start: 2020-04-22 | End: 2020-04-23 | Stop reason: HOSPADM

## 2020-04-22 RX ORDER — SODIUM CHLORIDE 0.9 % (FLUSH) 0.9 %
10 SYRINGE (ML) INJECTION
Status: DISCONTINUED | OUTPATIENT
Start: 2020-04-22 | End: 2020-04-23 | Stop reason: HOSPADM

## 2020-04-22 RX ORDER — SPIRONOLACTONE 25 MG/1
100 TABLET ORAL 2 TIMES DAILY
Status: DISCONTINUED | OUTPATIENT
Start: 2020-04-22 | End: 2020-04-23 | Stop reason: HOSPADM

## 2020-04-22 RX ORDER — FUROSEMIDE 40 MG/1
40 TABLET ORAL 2 TIMES DAILY
Status: DISCONTINUED | OUTPATIENT
Start: 2020-04-22 | End: 2020-04-23 | Stop reason: HOSPADM

## 2020-04-22 RX ADMIN — FUROSEMIDE 40 MG: 40 TABLET ORAL at 11:04

## 2020-04-22 RX ADMIN — CIPROFLOXACIN 750 MG: 750 TABLET, FILM COATED ORAL at 11:04

## 2020-04-22 RX ADMIN — IOHEXOL 75 ML: 350 INJECTION, SOLUTION INTRAVENOUS at 02:04

## 2020-04-22 RX ADMIN — LACTULOSE 30 G: 20 SOLUTION ORAL at 01:04

## 2020-04-22 RX ADMIN — LACTULOSE 20 G: 20 SOLUTION ORAL at 12:04

## 2020-04-22 RX ADMIN — SPIRONOLACTONE 100 MG: 25 TABLET ORAL at 11:04

## 2020-04-22 RX ADMIN — SPIRONOLACTONE 100 MG: 25 TABLET ORAL at 08:04

## 2020-04-22 RX ADMIN — CIPROFLOXACIN 500 MG: 500 TABLET, FILM COATED ORAL at 08:04

## 2020-04-22 RX ADMIN — PANTOPRAZOLE SODIUM 40 MG: 40 TABLET, DELAYED RELEASE ORAL at 08:04

## 2020-04-22 RX ADMIN — RIFAXIMIN 550 MG: 550 TABLET ORAL at 08:04

## 2020-04-22 RX ADMIN — LACTULOSE 20 G: 20 SOLUTION ORAL at 11:04

## 2020-04-22 RX ADMIN — PIPERACILLIN AND TAZOBACTAM 4.5 G: 4; .5 INJECTION, POWDER, LYOPHILIZED, FOR SOLUTION INTRAVENOUS; PARENTERAL at 04:04

## 2020-04-22 RX ADMIN — PANTOPRAZOLE SODIUM 40 MG: 40 TABLET, DELAYED RELEASE ORAL at 11:04

## 2020-04-22 RX ADMIN — FUROSEMIDE 40 MG: 40 TABLET ORAL at 09:04

## 2020-04-22 RX ADMIN — METOPROLOL TARTRATE 25 MG: 25 TABLET ORAL at 11:04

## 2020-04-22 RX ADMIN — SODIUM CHLORIDE 1000 ML: 0.9 INJECTION, SOLUTION INTRAVENOUS at 04:04

## 2020-04-22 RX ADMIN — RIFAXIMIN 550 MG: 550 TABLET ORAL at 11:04

## 2020-04-22 RX ADMIN — LORAZEPAM 1 MG: 2 INJECTION, SOLUTION INTRAMUSCULAR; INTRAVENOUS at 12:04

## 2020-04-22 RX ADMIN — METOPROLOL TARTRATE 25 MG: 25 TABLET ORAL at 08:04

## 2020-04-22 RX ADMIN — LACTULOSE 20 G: 20 SOLUTION ORAL at 06:04

## 2020-04-22 RX ADMIN — LORAZEPAM 2 MG: 2 INJECTION, SOLUTION INTRAMUSCULAR; INTRAVENOUS at 12:04

## 2020-04-22 RX ADMIN — LACTULOSE 20 G: 20 SOLUTION ORAL at 08:04

## 2020-04-22 NOTE — SIGNIFICANT EVENT
04/22/2020 EXAM THIS MORNING: She is somnolent, but she will arouse to verbal stimuli. She is oriented to self at best. She will follow simple commands (squeeze my fingers). She will not follow other commands such as raise one finger. Abdomen is distended, minimally tender, but without guarding or rebound tenderness. She has a nasogastric tube in place.    She had therapeutic paracentesis of 2L of colette ascitic fluid this afternoon.    I reviewed all labs and imaging reports for the last 24 hours and discussed case with on-call GI.    PLAN: Continue present treatment plan.    CITLALLI Fay MD

## 2020-04-22 NOTE — ED NOTES
Spoke with pt brother in law; updated him on pt status and plan of care. Provided him with ER number to call for questions if needed.

## 2020-04-22 NOTE — NURSING
Flori Correa reached on the phone for consent for paracentesis.  ERICK Armas explained procedure and risks with her.

## 2020-04-22 NOTE — ASSESSMENT & PLAN NOTE
MELD-Na score: 12 at 4/21/2020 11:57 PM  MELD score: 12 at 4/21/2020 11:57 PM  Calculated from:  Serum Creatinine: 0.9 mg/dL (Rounded to 1 mg/dL) at 4/21/2020 11:57 PM  Serum Sodium: 139 mmol/L (Rounded to 137 mmol/L) at 4/21/2020 11:57 PM  Total Bilirubin: 1.5 mg/dL at 4/21/2020 11:57 PM  INR(ratio): 1.4 at 4/21/2020 11:57 PM  Age: 59 years

## 2020-04-22 NOTE — ED NOTES
Provider notification: Dr. Orozco  Pt restless, cursing staff, irritable, not cooperative for x-ray

## 2020-04-22 NOTE — ASSESSMENT & PLAN NOTE
- Continue home maintenance dose Lasix and spironolactone.  - Continue ciprofloxacin for SBP prophylaxis.  - Continue BB for varices prophylaxis.   - Will consult IR for paracentesis in AM.  - Avoid hepatotoxic agents.  - Follow labs.  MELD-Na score: 12 at 4/21/2020 11:57 PM  MELD score: 12 at 4/21/2020 11:57 PM  Calculated from:  Serum Creatinine: 0.9 mg/dL (Rounded to 1 mg/dL) at 4/21/2020 11:57 PM  Serum Sodium: 139 mmol/L (Rounded to 137 mmol/L) at 4/21/2020 11:57 PM  Total Bilirubin: 1.5 mg/dL at 4/21/2020 11:57 PM  INR(ratio): 1.4 at 4/21/2020 11:57 PM  Age: 59 years

## 2020-04-22 NOTE — SUBJECTIVE & OBJECTIVE
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      Current Outpatient Medications on File Prior to Encounter   Medication Sig    albuterol (PROVENTIL/VENTOLIN HFA) 90 mcg/actuation inhaler Inhale 2 puffs into the lungs every 6 (six) hours.    amitriptyline (ELAVIL) 25 MG tablet Take 25 mg by mouth every evening.    ciprofloxacin HCl (CIPRO) 500 MG tablet Take 1 tablet (500 mg total) by mouth once daily.    furosemide (LASIX) 40 MG tablet Take 1 tablet (40 mg total) by mouth 2 (two) times daily.    lactulose 10 gram/15 ml (CHRONULAC) 10 gram/15 mL (15 mL) solution Take 30 mLs (20 g total) by mouth 3 (three) times daily.    metoprolol tartrate (LOPRESSOR) 25 MG tablet TK 1 T PO BID    ondansetron (ZOFRAN) 4 MG tablet     ondansetron (ZOFRAN-ODT) 4 MG TbDL Take 1 tablet (4 mg total) by mouth every 8 (eight) hours as needed (nausea).    pantoprazole (PROTONIX) 40 MG tablet Take 40 mg by mouth 2 (two) times daily.    spironolactone (ALDACTONE) 100 MG tablet Take 1 tablet (100 mg total) by mouth 2 (two) times daily.     Family History     Reviewed and not pertinent.         Tobacco Use    Smoking status: Current Every Day Smoker     Packs/day: 0.25     Years: 45.00     Pack years: 11.25     Types: Cigarettes    Smokeless tobacco: Never Used   Substance and Sexual Activity    Alcohol use: Not Currently    Drug use: Snorts meth and cocaine    Sexual activity: Not on file     Review of Systems   Unable to perform ROS: Mental status change   Psychiatric/Behavioral: Positive for confusion.     Objective:     Vital Signs (Most Recent):  Temp: 98.3 °F (36.8 °C) (04/21/20 2328)  Pulse: 109 (04/22/20  0317)  Resp: 18 (04/22/20 0317)  BP: 117/68 (04/22/20 0316)  SpO2: 100 % (04/22/20 0317) Vital Signs (24h Range):  Temp:  [98.3 °F (36.8 °C)] 98.3 °F (36.8 °C)  Pulse:  [106-123] 109  Resp:  [16-20] 18  SpO2:  [99 %-100 %] 100 %  BP: (117-134)/(68-74) 117/68     Weight: 56.4 kg (124 lb 5.4 oz)  Body mass index is 22.74 kg/m².    Physical Exam   Constitutional: She appears well-developed and well-nourished. She appears lethargic. She is sleeping. No distress. She is sedated.   HENT:   Head: Normocephalic and atraumatic.   Eyes: Conjunctivae are normal.   PERRL; EOM intact.   Neck: Normal range of motion. Neck supple.   Cardiovascular: Normal rate, regular rhythm, S1 normal and S2 normal.  No extrasystoles are present. Exam reveals no gallop.   No murmur heard.  Pulmonary/Chest: Effort normal and breath sounds normal. No accessory muscle usage. No tachypnea. No respiratory distress. She has no wheezes. She has no rhonchi. She has no rales.   Abdominal: Soft. Bowel sounds are normal. She exhibits ascites (large). There is no tenderness. There is no rebound and no guarding.   Musculoskeletal: Normal range of motion. She exhibits edema (2+ BLE).   Neurological: She appears lethargic. GCS eye subscore is 2. GCS verbal subscore is 3. GCS motor subscore is 5.   Exam limited due to recent IV Ativan given by ED.  Patient sedated, responds to painful stimuli.    Skin: Skin is warm, dry and intact. Capillary refill takes less than 2 seconds. No rash noted. She is not diaphoretic. No cyanosis or erythema.   Psychiatric:   Unable to assess due to AMS.   Nursing note and vitals reviewed.          Significant Labs:  Results for orders placed or performed during the hospital encounter of 04/21/20   CBC auto differential   Result Value Ref Range    WBC 2.71 (L) 3.90 - 12.70 K/uL    RBC 3.44 (L) 4.00 - 5.40 M/uL    Hemoglobin 10.9 (L) 12.0 - 16.0 g/dL    Hematocrit 32.9 (L) 37.0 - 48.5 %    Mean Corpuscular Volume 96 82 - 98 fL     Mean Corpuscular Hemoglobin 31.7 (H) 27.0 - 31.0 pg    Mean Corpuscular Hemoglobin Conc 33.1 32.0 - 36.0 g/dL    RDW 15.5 (H) 11.5 - 14.5 %    Platelets 78 (L) 150 - 350 K/uL    MPV 11.1 9.2 - 12.9 fL    Immature Granulocytes 0.7 (H) 0.0 - 0.5 %    Gran # (ANC) 1.5 (L) 1.8 - 7.7 K/uL    Immature Grans (Abs) 0.02 0.00 - 0.04 K/uL    Lymph # 0.5 (L) 1.0 - 4.8 K/uL    Mono # 0.5 0.3 - 1.0 K/uL    Eos # 0.1 0.0 - 0.5 K/uL    Baso # 0.03 0.00 - 0.20 K/uL    nRBC 0 0 /100 WBC    Gran% 56.1 38.0 - 73.0 %    Lymph% 18.1 18.0 - 48.0 %    Mono% 19.9 (H) 4.0 - 15.0 %    Eosinophil% 4.1 0.0 - 8.0 %    Basophil% 1.1 0.0 - 1.9 %    Differential Method Automated    Comprehensive metabolic panel   Result Value Ref Range    Sodium 139 136 - 145 mmol/L    Potassium 3.8 3.5 - 5.1 mmol/L    Chloride 106 95 - 110 mmol/L    CO2 21 (L) 23 - 29 mmol/L    Glucose 116 (H) 70 - 110 mg/dL    BUN, Bld 12 8 - 23 mg/dL    Creatinine 0.9 0.5 - 1.4 mg/dL    Calcium 8.0 (L) 8.7 - 10.5 mg/dL    Total Protein 7.1 6.0 - 8.4 g/dL    Albumin 2.6 (L) 3.5 - 5.2 g/dL    Total Bilirubin 1.5 (H) 0.1 - 1.0 mg/dL    Alkaline Phosphatase 159 (H) 55 - 135 U/L    AST 57 (H) 10 - 40 U/L    ALT 35 10 - 44 U/L    Anion Gap 12 8 - 16 mmol/L    eGFR if African American >60 >60 mL/min/1.73 m^2    eGFR if non African American >60 >60 mL/min/1.73 m^2   C-Reactive Protein   Result Value Ref Range    CRP 11.1 (H) 0.0 - 8.2 mg/L   Lactate Dehydrogenase   Result Value Ref Range     (H) 110 - 260 U/L   CK   Result Value Ref Range    CPK 70 20 - 180 U/L   Lactic Acid, Plasma   Result Value Ref Range    Lactate (Lactic Acid) 2.6 (H) 0.5 - 2.2 mmol/L   Troponin I   Result Value Ref Range    Troponin I 0.013 0.000 - 0.026 ng/mL   COVID-19 Rapid Screening   Result Value Ref Range    SARS-CoV-2 RNA, Amplification, Qual Negative Negative   Procalcitonin   Result Value Ref Range    Procalcitonin 0.08 <0.25 ng/mL   Magnesium   Result Value Ref Range    Magnesium 1.9 1.6 - 2.6  mg/dL   Acetaminophen level   Result Value Ref Range    Acetaminophen (Tylenol), Serum <3.0 (L) 10.0 - 20.0 ug/mL   Drug screen panel, emergency   Result Value Ref Range    Benzodiazepines Negative     Methadone metabolites Negative     Cocaine (Metab.) Negative     Opiate Scrn, Ur Negative     Barbiturate Screen, Ur Negative     Amphetamine Screen, Ur Presumptive Positive     THC Negative     Phencyclidine Negative     Creatinine, Random Ur 108.8 15.0 - 325.0 mg/dL    Toxicology Information SEE COMMENT    TSH   Result Value Ref Range    TSH 0.710 0.400 - 4.000 uIU/mL   Urinalysis, Reflex to Urine Culture Urine, Clean Catch   Result Value Ref Range    Specimen UA Urine, Catheterized     Color, UA Yellow Yellow, Straw, Laure    Appearance, UA Clear Clear    pH, UA 7.0 5.0 - 8.0    Specific Gravity, UA 1.015 1.005 - 1.030    Protein, UA Negative Negative    Glucose, UA Negative Negative    Ketones, UA Negative Negative    Bilirubin (UA) Negative Negative    Occult Blood UA Negative Negative    Nitrite, UA Negative Negative    Urobilinogen, UA 2.0-3.0 (A) <2.0 EU/dL    Leukocytes, UA Negative Negative   Ethanol   Result Value Ref Range    Alcohol, Medical, Serum <10 <10 mg/dL   APTT   Result Value Ref Range    aPTT 28.9 21.0 - 32.0 sec   Protime-INR   Result Value Ref Range    Prothrombin Time 14.2 (H) 9.0 - 12.5 sec    INR 1.4 (H) 0.8 - 1.2   Ammonia   Result Value Ref Range    Ammonia 205 (H) 10 - 50 umol/L      All pertinent labs within the past 24 hours have been reviewed.    Significant Imaging:  Imaging Results          CT Abdomen Pelvis With Contrast (In process)    Per STAT radiology:   1. Large amount of ascites.  2. Nodular contour liver suggestive cirrhosis.  3. Cholelithiasis with gallbladder wall thickening gallbladder distention. The gallbladder wall thickening is nonspecific in the setting or cirrhosis, clinically correlate for cholecystitis.  4. Extensive thickening of the right hemicolon possible due  to cirrhosis but difficult to exclude superimposed colitis. Gastric fold thciekning is also seen. Small hialtal hernia.  5. Multilevel degenerative changes of the spine. Chronic compression deformities of T7, T11, L1, L2, L3, and L4. Degenerative endplate sclerosis of L5/S1. Posterior disc disease suspected at L4/L5 and L5/S1.              CT Head Without Contrast (In process)    Per STAT radiology:   1. The study is limited by motion.  2. Slightly asymmetric bilateral basal calcifications, more prominent on the right. Mild chronic small vessel ischemic change.              X-Ray Abdomen AP 1 View (KUB) (In process)                X-Ray Chest AP Portable (In process)    Stable chest.  No acute process.            I have reviewed all pertinent imaging results/findings within the past 24 hours.

## 2020-04-22 NOTE — NURSING
Confusion noted and remains unchanged today.  Her ammonia is elevated, possibly the source of confusion.  Lactulose administered per ng.  Neuro status has not changed throughout the morning.

## 2020-04-22 NOTE — ASSESSMENT & PLAN NOTE
- Initial ammonia of 205, likely due to medication noncompliance.     - Lactulose 20 g Q6 hours until mental status improved.  - Continue rifaximin.   - NG tube placed in the ED for medication administration.

## 2020-04-22 NOTE — ASSESSMENT & PLAN NOTE
- History of snorting meth and cocaine.  UDS (+) amphetamines.  Will need cessation counseling once mental status improved.  - Monitor for withdrawal.

## 2020-04-22 NOTE — HPI
Unable to obtain history from patient due to AMS.  HPI obtained from ED physician and past medical record.  Ms. Siegel is a 59 y.o. female with a PMHx of liver cirrhosis related to HCV with ascites requiring weekly paracentesis, tobacco use, and history of polysubstance abuse (snorting cocaine and meth).  She presented to the ED with c/o AMS x 1 day.  Patient's family called EMS stating patient had been sleeping all day and they thought her ammonia was likely elevated.  No associated symptoms.  Her last paracentesis was on 4/16 with 4L removed.  In the ED, patient became very agitated and combative requiring a total of 3 mg IV Ativan.  Initial work-up resulted ammonia of 205, AST 57, ALT 35, TBili 1.5, albumin 2.6, sodium 139, INR 1.4, platelets 78, lactic 2.6, UDS (+) for amphetamine, UA and CXR unremarkable, CT of the head negative for acute process, and CT of ABD/pelvis showed liver cirrhosis with large amount of ascites.  Patient became somnolent after IV Ativan given per ED, therefore, NG tube was placed for medication administration.  Patient received 1 L IV fluids and 30 g lactulose.  Hospital Medicine was called for admission.

## 2020-04-22 NOTE — PLAN OF CARE
Ongoing (interventions implemented as appropriate)  Pt has decreased LOC.    VSS  Pt able to make needs known.  Pt remained afebrile throughout this shift.   Pt remained free of falls this shift.   Pt denies pain this shift.  Plan of care reviewed. Patient verbalizes understanding.   Pt moving/turing independent. Frequent weight shifting encouraged.  Patient normal sinus rhythm on monitor.   Bed low, side rails up x 2, wheels locked, call light in reach.   Hourly rounding completed.   Will continue to monitor.

## 2020-04-22 NOTE — CONSULTS
Chart reviewed and patient appears to be a candidate for a paracentesis.  I will place the order and the procedure will be performed asap.  Thank you for the consult.

## 2020-04-22 NOTE — ED NOTES
Updated brother in law on POC. Brother in law requests updates when available and states he will be available to pick pt up if she is discharged. Manju Gardiner [phone]

## 2020-04-22 NOTE — ED NOTES
Pt disoriented and uncooperative. Unable to assess home meds, fall risk, or medical history at this time. Pt cursing and fighting at staff. Dr. Orozco notified.

## 2020-04-22 NOTE — HOSPITAL COURSE
04/22/2020 She is somnolent, but she will arouse to verbal stimuli. She is oriented to self at best. She will follow simple commands (squeeze my fingers). She will not follow other commands such as raise one finger. Abdomen is distended, minimally tender, but without guarding or rebound tenderness. She has a nasogastric tube in place. She had therapeutic paracentesis of 2L of colette ascitic fluid this afternoon.  04/23/2020 She is lucid and conversant this morning. Her ammonia level has dropped to 78. Her abdomen is much less distended. Nasogastric tube has been removed and she is able to eat and drink. She has been seen by GI. She is stable for discharge home with outpatient follow-up. I educated her about importance of low-sodium diet, outpatient drug-rehab, and follow-up with her hepatologist Dr. Green and a new PCP.

## 2020-04-22 NOTE — ASSESSMENT & PLAN NOTE
- Secondary to liver diease.  Platelets stable.  No evidence of active bleeding.  - No pharmacological DVT prophylaxis.   - Follow labs.

## 2020-04-22 NOTE — OP NOTE
Pre Op Diagnosis: ascites     Post Op Diagnosis: same     Procedure:  para     Procedure performed by: Ameya MONTENEGRO, Herbert ONTIVEROS     Written Informed Consent Obtained: Yes     Specimen Removed:  yes     Estimated Blood Loss:  minimal     Findings: Local anesthesia.     The patient tolerated the procedure well and there were no complications.      Sterile technique was performed in the RUQ, lidocaine was used as a local anesthetic.   2 liters of colette fluid removed for therapeutic purposes.  Pt tolerated the procedure well without immediate complications.  Please see radiologist report for details. F/u with PCP and/or ordering physician.

## 2020-04-22 NOTE — H&P
[facility] Hospital Medicine  History & Physical    Patient Name: Jossy Siegel  MRN: [id number]  Admission Date: 4/21/2020  Attending Physician: Tino Rey MD   Primary Care Provider: Primary Doctor No         Patient information was obtained from past medical records and ER records.     Subjective:     Principal Problem:Hepatic encephalopathy    Chief Complaint:   Chief Complaint   Patient presents with    Altered Mental Status        HPI: Unable to obtain history from patient due to AMS.  HPI obtained from ED physician and past medical record.  Ms. Siegel is a 59 y.o. female with a PMHx of liver cirrhosis related to HCV with ascites requiring weekly paracentesis, tobacco use, and history of polysubstance abuse (snorting cocaine and meth).  She presented to the ED with c/o AMS x 1 day.  Patient's family called EMS stating patient had been sleeping all day and they thought her ammonia was likely elevated.  No associated symptoms.  Her last paracentesis was on 4/16 with 4L removed.  In the ED, patient became very agitated and combative requiring a total of 3 mg IV Ativan.  Initial work-up resulted ammonia of 205, AST 57, ALT 35, TBili 1.5, albumin 2.6, sodium 139, INR 1.4, platelets 78, lactic 2.6, UDS (+) for amphetamine, UA and CXR unremarkable, CT of the head negative for acute process, and CT of ABD/pelvis showed liver cirrhosis with large amount of ascites.  Patient became somnolent after IV Ativan given per ED, therefore, NG tube was placed for medication administration.  Patient received 1 L IV fluids and 30 g lactulose.  Hospital Medicine was called for admission.      Past Medical History:   Diagnosis Date    Cirrhosis of liver with ascites related to hepatitis C virus     Cirrhosis     Hepatitis C        Past Surgical History:   Procedure Laterality Date    PARACENTESIS         Review of patient's allergies indicates:  No Known Allergies    No current  facility-administered medications on file prior to encounter.      Current Outpatient Medications on File Prior to Encounter   Medication Sig    albuterol (PROVENTIL/VENTOLIN HFA) 90 mcg/actuation inhaler Inhale 2 puffs into the lungs every 6 (six) hours.    amitriptyline (ELAVIL) 25 MG tablet Take 25 mg by mouth every evening.    ciprofloxacin HCl (CIPRO) 500 MG tablet Take 1 tablet (500 mg total) by mouth once daily.    furosemide (LASIX) 40 MG tablet Take 1 tablet (40 mg total) by mouth 2 (two) times daily.    lactulose 10 gram/15 ml (CHRONULAC) 10 gram/15 mL (15 mL) solution Take 30 mLs (20 g total) by mouth 3 (three) times daily.    metoprolol tartrate (LOPRESSOR) 25 MG tablet TK 1 T PO BID    ondansetron (ZOFRAN) 4 MG tablet     ondansetron (ZOFRAN-ODT) 4 MG TbDL Take 1 tablet (4 mg total) by mouth every 8 (eight) hours as needed (nausea).    pantoprazole (PROTONIX) 40 MG tablet Take 40 mg by mouth 2 (two) times daily.    spironolactone (ALDACTONE) 100 MG tablet Take 1 tablet (100 mg total) by mouth 2 (two) times daily.     Family History     Reviewed and not pertinent.         Tobacco Use    Smoking status: Current Every Day Smoker     Packs/day: 0.25     Years: 45.00     Pack years: 11.25     Types: Cigarettes    Smokeless tobacco: Never Used   Substance and Sexual Activity    Alcohol use: Not Currently    Drug use: Snorts meth and cocaine    Sexual activity: Not on file     Review of Systems   Unable to perform ROS: Mental status change   Psychiatric/Behavioral: Positive for confusion.     Objective:     Vital Signs (Most Recent):  Temp: 98.3 °F (36.8 °C) (04/21/20 2328)  Pulse: 109 (04/22/20 0317)  Resp: 18 (04/22/20 0317)  BP: 117/68 (04/22/20 0316)  SpO2: 100 % (04/22/20 0317) Vital Signs (24h Range):  Temp:  [98.3 °F (36.8 °C)] 98.3 °F (36.8 °C)  Pulse:  [106-123] 109  Resp:  [16-20] 18  SpO2:  [99 %-100 %] 100 %  BP: (117-134)/(68-74) 117/68     Weight: 56.4 kg (124 lb 5.4 oz)  Body mass  index is 22.74 kg/m².    Physical Exam   Constitutional: She appears well-developed and well-nourished. She appears lethargic. She is sleeping. No distress. She is sedated.   HENT:   Head: Normocephalic and atraumatic.   Eyes: Conjunctivae are normal.   PERRL; EOM intact.   Neck: Normal range of motion. Neck supple.   Cardiovascular: Normal rate, regular rhythm, S1 normal and S2 normal.  No extrasystoles are present. Exam reveals no gallop.   No murmur heard.  Pulmonary/Chest: Effort normal and breath sounds normal. No accessory muscle usage. No tachypnea. No respiratory distress. She has no wheezes. She has no rhonchi. She has no rales.   Abdominal: Soft. Bowel sounds are normal. She exhibits ascites (large). There is no tenderness. There is no rebound and no guarding.   Musculoskeletal: Normal range of motion. She exhibits edema (2+ BLE).   Neurological: She appears lethargic. GCS eye subscore is 2. GCS verbal subscore is 3. GCS motor subscore is 5.   Exam limited due to recent IV Ativan given by ED.  Patient sedated, responds to painful stimuli.    Skin: Skin is warm, dry and intact. Capillary refill takes less than 2 seconds. No rash noted. She is not diaphoretic. No cyanosis or erythema.   Psychiatric:   Unable to assess due to AMS.   Nursing note and vitals reviewed.          Significant Labs:  Results for orders placed or performed during the hospital encounter of 04/21/20   CBC auto differential   Result Value Ref Range    WBC 2.71 (L) 3.90 - 12.70 K/uL    RBC 3.44 (L) 4.00 - 5.40 M/uL    Hemoglobin 10.9 (L) 12.0 - 16.0 g/dL    Hematocrit 32.9 (L) 37.0 - 48.5 %    Mean Corpuscular Volume 96 82 - 98 fL    Mean Corpuscular Hemoglobin 31.7 (H) 27.0 - 31.0 pg    Mean Corpuscular Hemoglobin Conc 33.1 32.0 - 36.0 g/dL    RDW 15.5 (H) 11.5 - 14.5 %    Platelets 78 (L) 150 - 350 K/uL    MPV 11.1 9.2 - 12.9 fL    Immature Granulocytes 0.7 (H) 0.0 - 0.5 %    Gran # (ANC) 1.5 (L) 1.8 - 7.7 K/uL    Immature Grans (Abs)  0.02 0.00 - 0.04 K/uL    Lymph # 0.5 (L) 1.0 - 4.8 K/uL    Mono # 0.5 0.3 - 1.0 K/uL    Eos # 0.1 0.0 - 0.5 K/uL    Baso # 0.03 0.00 - 0.20 K/uL    nRBC 0 0 /100 WBC    Gran% 56.1 38.0 - 73.0 %    Lymph% 18.1 18.0 - 48.0 %    Mono% 19.9 (H) 4.0 - 15.0 %    Eosinophil% 4.1 0.0 - 8.0 %    Basophil% 1.1 0.0 - 1.9 %    Differential Method Automated    Comprehensive metabolic panel   Result Value Ref Range    Sodium 139 136 - 145 mmol/L    Potassium 3.8 3.5 - 5.1 mmol/L    Chloride 106 95 - 110 mmol/L    CO2 21 (L) 23 - 29 mmol/L    Glucose 116 (H) 70 - 110 mg/dL    BUN, Bld 12 8 - 23 mg/dL    Creatinine 0.9 0.5 - 1.4 mg/dL    Calcium 8.0 (L) 8.7 - 10.5 mg/dL    Total Protein 7.1 6.0 - 8.4 g/dL    Albumin 2.6 (L) 3.5 - 5.2 g/dL    Total Bilirubin 1.5 (H) 0.1 - 1.0 mg/dL    Alkaline Phosphatase 159 (H) 55 - 135 U/L    AST 57 (H) 10 - 40 U/L    ALT 35 10 - 44 U/L    Anion Gap 12 8 - 16 mmol/L    eGFR if African American >60 >60 mL/min/1.73 m^2    eGFR if non African American >60 >60 mL/min/1.73 m^2   C-Reactive Protein   Result Value Ref Range    CRP 11.1 (H) 0.0 - 8.2 mg/L   Lactate Dehydrogenase   Result Value Ref Range     (H) 110 - 260 U/L   CK   Result Value Ref Range    CPK 70 20 - 180 U/L   Lactic Acid, Plasma   Result Value Ref Range    Lactate (Lactic Acid) 2.6 (H) 0.5 - 2.2 mmol/L   Troponin I   Result Value Ref Range    Troponin I 0.013 0.000 - 0.026 ng/mL   COVID-19 Rapid Screening   Result Value Ref Range    SARS-CoV-2 RNA, Amplification, Qual Negative Negative   Procalcitonin   Result Value Ref Range    Procalcitonin 0.08 <0.25 ng/mL   Magnesium   Result Value Ref Range    Magnesium 1.9 1.6 - 2.6 mg/dL   Acetaminophen level   Result Value Ref Range    Acetaminophen (Tylenol), Serum <3.0 (L) 10.0 - 20.0 ug/mL   Drug screen panel, emergency   Result Value Ref Range    Benzodiazepines Negative     Methadone metabolites Negative     Cocaine (Metab.) Negative     Opiate Scrn, Ur Negative     Barbiturate  Screen, Ur Negative     Amphetamine Screen, Ur Presumptive Positive     THC Negative     Phencyclidine Negative     Creatinine, Random Ur 108.8 15.0 - 325.0 mg/dL    Toxicology Information SEE COMMENT    TSH   Result Value Ref Range    TSH 0.710 0.400 - 4.000 uIU/mL   Urinalysis, Reflex to Urine Culture Urine, Clean Catch   Result Value Ref Range    Specimen UA Urine, Catheterized     Color, UA Yellow Yellow, Straw, Laure    Appearance, UA Clear Clear    pH, UA 7.0 5.0 - 8.0    Specific Gravity, UA 1.015 1.005 - 1.030    Protein, UA Negative Negative    Glucose, UA Negative Negative    Ketones, UA Negative Negative    Bilirubin (UA) Negative Negative    Occult Blood UA Negative Negative    Nitrite, UA Negative Negative    Urobilinogen, UA 2.0-3.0 (A) <2.0 EU/dL    Leukocytes, UA Negative Negative   Ethanol   Result Value Ref Range    Alcohol, Medical, Serum <10 <10 mg/dL   APTT   Result Value Ref Range    aPTT 28.9 21.0 - 32.0 sec   Protime-INR   Result Value Ref Range    Prothrombin Time 14.2 (H) 9.0 - 12.5 sec    INR 1.4 (H) 0.8 - 1.2   Ammonia   Result Value Ref Range    Ammonia 205 (H) 10 - 50 umol/L      All pertinent labs within the past 24 hours have been reviewed.    Significant Imaging:  Imaging Results          CT Abdomen Pelvis With Contrast (In process)    Per STAT radiology:   1. Large amount of ascites.  2. Nodular contour liver suggestive cirrhosis.  3. Cholelithiasis with gallbladder wall thickening gallbladder distention. The gallbladder wall thickening is nonspecific in the setting or cirrhosis, clinically correlate for cholecystitis.  4. Extensive thickening of the right hemicolon possible due to cirrhosis but difficult to exclude superimposed colitis. Gastric fold thciekning is also seen. Small hialtal hernia.  5. Multilevel degenerative changes of the spine. Chronic compression deformities of T7, T11, L1, L2, L3, and L4. Degenerative endplate sclerosis of L5/S1. Posterior disc disease suspected  at L4/L5 and L5/S1.              CT Head Without Contrast (In process)    Per STAT radiology:   1. The study is limited by motion.  2. Slightly asymmetric bilateral basal calcifications, more prominent on the right. Mild chronic small vessel ischemic change.              X-Ray Abdomen AP 1 View (KUB) (In process)                X-Ray Chest AP Portable (In process)    Stable chest.  No acute process.            I have reviewed all pertinent imaging results/findings within the past 24 hours.           Assessment/Plan:     * Hepatic encephalopathy  - Initial ammonia of 205, likely due to medication noncompliance.     - Lactulose 20 g Q6 hours until mental status improved.  - Continue rifaximin.   - NG tube placed in the ED for medication administration.    Cirrhosis of liver with ascites related to hepatitis C virus  - Continue home maintenance dose Lasix and spironolactone.  - Continue ciprofloxacin for SBP prophylaxis.  - Continue BB for varices prophylaxis.   - Will consult IR for paracentesis in AM.  - Avoid hepatotoxic agents.  - Follow labs.  MELD-Na score: 12 at 4/21/2020 11:57 PM  MELD score: 12 at 4/21/2020 11:57 PM  Calculated from:  Serum Creatinine: 0.9 mg/dL (Rounded to 1 mg/dL) at 4/21/2020 11:57 PM  Serum Sodium: 139 mmol/L (Rounded to 137 mmol/L) at 4/21/2020 11:57 PM  Total Bilirubin: 1.5 mg/dL at 4/21/2020 11:57 PM  INR(ratio): 1.4 at 4/21/2020 11:57 PM  Age: 59 years    Methamphetamine use  - History of snorting meth and cocaine.  UDS (+) amphetamines.  Will need cessation counseling once mental status improved.  - Monitor for withdrawal.    Thrombocytopenia  - Secondary to liver diease.  Platelets stable.  No evidence of active bleeding.  - No pharmacological DVT prophylaxis.   - Follow labs.      VTE Risk Mitigation (From admission, onward)         Ordered     IP VTE LOW RISK PATIENT  Once      04/22/20 0438     Place sequential compression device  Until discontinued      04/22/20 0438                    Brittany Horner NP  Department of Hospital Medicine   [facility]

## 2020-04-22 NOTE — ED NOTES
Verbal order from Dr. Orozco to give lactulose rectally at this time. Pt unable to swallow medications due to altered mental status.

## 2020-04-22 NOTE — ED PROVIDER NOTES
SCRIBE #1 NOTE: I, Brant Messer, am scribing for, and in the presence of, Jazmine Orozco MD. I have scribed the entire note.      History      Chief Complaint   Patient presents with    Altered Mental Status       Review of patient's allergies indicates:  No Known Allergies     HPI   HPI    4/22/2020, 12:20 AM   History obtained from EMS and pt's family  HPI and ROS limited secondary to pt's AMS      History of Present Illness: Jossy Siegel is a 59 y.o. female patient with a PMHx of cirrhosis who presents to the Emergency Department for AMS, onset just PTA. Per EMS, pt's family called EMS after the pt had been sleeping all day. Pt gets weekly paracenteses, per family. Symptoms are constant and moderate in severity. No mitigating or exacerbating factors reported. No associated sxs reported. Patient is unable to provide any pertinent negatives at this time. No prior Tx reported. No further complaints or concerns at this time.     Arrival mode: EMS    PCP: Primary Doctor No       Past Medical History:  Past Medical History:   Diagnosis Date    Alcoholic cirrhosis of liver with ascites     Cirrhosis     Hepatitis     Pancytopenia 2/5/2020    Last Assessment & Plan:  History & Physical Chronic.  Stable.  Follow-up repeat CBC and INR in a.m.  Discharge Summary  Chronic.  Stable. Likely secondary to cirrhosis. Follow-up  Chronic.  Stable. Likely secondary to cirrhosis.       Past Surgical History:  Past Surgical History:   Procedure Laterality Date    PARACENTESIS           Family History:  History reviewed. No pertinent family history.    Social History:  Social History     Tobacco Use    Smoking status: Current Every Day Smoker     Packs/day: 0.25     Years: 45.00     Pack years: 11.25     Types: Cigarettes    Smokeless tobacco: Never Used   Substance and Sexual Activity    Alcohol use: Not Currently    Drug use: Never    Sexual activity: Not on file       ROS   Review of Systems   Unable to perform ROS:  Mental status change     Physical Exam      Initial Vitals [04/21/20 2328]   BP Pulse Resp Temp SpO2   134/73 (!) 118 16 98.3 °F (36.8 °C) 99 %      MAP       --          Physical Exam  Nursing Notes and Vital Signs Reviewed.  Constitutional: Patient is in no acute distress.   Head: Atraumatic. Normocephalic.  Eyes: PERRL. EOM intact. Conjunctivae are not pale. No scleral icterus.  ENT: Mucous membranes are moist. Oropharynx is clear and symmetric.    Neck: Supple. Full ROM. No lymphadenopathy.  Cardiovascular: Tachycardic. Regular rhythm. No murmurs, rubs, or gallops. Distal pulses are 2+ and symmetric.  Pulmonary/Chest: No respiratory distress. Clear to auscultation bilaterally. No wheezing or rales.  Abdominal: Distended.  There is no tenderness.  No rebound, guarding, or rigidity.   Musculoskeletal: Moves all extremities. No obvious deformities. No edema.  Skin: Warm and dry.  Neurological:  Altered mental status. Aggressive. GCS 11.  Psychiatric: Altered.     ED Course    Critical Care  Date/Time: 4/22/2020 4:03 AM  Performed by: Jazmine Orozco MD  Authorized by: Tino Rey MD   Direct patient critical care time: 25 minutes  Additional history critical care time: 5 minutes  Ordering / reviewing critical care time: 5 minutes  Documentation critical care time: 5 minutes  Consulting other physicians critical care time: 5 minutes  Total critical care time (exclusive of procedural time) : 45 minutes  Critical care time was exclusive of separately billable procedures and treating other patients and teaching time.  Critical care was necessary to treat or prevent imminent or life-threatening deterioration of the following conditions: Hepatic encephalopathy.  Critical care was time spent personally by me on the following activities: blood draw for specimens, development of treatment plan with patient or surrogate, discussions with consultants, interpretation of cardiac output measurements, evaluation of  patient's response to treatment, examination of patient, obtaining history from patient or surrogate, ordering and performing treatments and interventions, ordering and review of laboratory studies, ordering and review of radiographic studies, pulse oximetry and re-evaluation of patient's condition.        ED Vital Signs:  Vitals:    04/22/20 1620 04/22/20 1659 04/22/20 1808 04/22/20 1915   BP:  120/66     Pulse: 92 88 92 101   Resp:  18     Temp:  98 °F (36.7 °C)     TempSrc:  Oral     SpO2:  95%     Weight:       Height:        04/22/20 2050 04/22/20 2123 04/22/20 2326 04/23/20 0024   BP: 114/61   (!) 104/57   Pulse: 102 97 82 87   Resp: 20   17   Temp: 99 °F (37.2 °C)   98.3 °F (36.8 °C)   TempSrc: Oral   Oral   SpO2: (!) 93%   95%   Weight:       Height:        04/23/20 0118 04/23/20 0304 04/23/20 0501 04/23/20 0502   BP:    (!) 112/58   Pulse: 82 85 64    Resp:   16 17   Temp:   98.6 °F (37 °C) 98.6 °F (37 °C)   TempSrc:   Oral    SpO2:   100% 100%   Weight:       Height:        04/23/20 0537 04/23/20 0755 04/23/20 0947   BP:  (!) 111/58    Pulse: 73 67 70   Resp:  18    Temp:  97.6 °F (36.4 °C)    TempSrc:  Oral    SpO2:  98%    Weight: 63 kg (138 lb 14.2 oz)     Height:          Abnormal Lab Results:  Labs Reviewed   CBC W/ AUTO DIFFERENTIAL - Abnormal; Notable for the following components:       Result Value    WBC 2.71 (*)     RBC 3.44 (*)     Hemoglobin 10.9 (*)     Hematocrit 32.9 (*)     Mean Corpuscular Hemoglobin 31.7 (*)     RDW 15.5 (*)     Platelets 78 (*)     Immature Granulocytes 0.7 (*)     Gran # (ANC) 1.5 (*)     Lymph # 0.5 (*)     Mono% 19.9 (*)     All other components within normal limits   COMPREHENSIVE METABOLIC PANEL - Abnormal; Notable for the following components:    CO2 21 (*)     Glucose 116 (*)     Calcium 8.0 (*)     Albumin 2.6 (*)     Total Bilirubin 1.5 (*)     Alkaline Phosphatase 159 (*)     AST 57 (*)     All other components within normal limits   C-REACTIVE PROTEIN -  Abnormal; Notable for the following components:    CRP 11.1 (*)     All other components within normal limits   LACTATE DEHYDROGENASE - Abnormal; Notable for the following components:     (*)     All other components within normal limits   LACTIC ACID, PLASMA - Abnormal; Notable for the following components:    Lactate (Lactic Acid) 2.6 (*)     All other components within normal limits   ACETAMINOPHEN LEVEL - Abnormal; Notable for the following components:    Acetaminophen (Tylenol), Serum <3.0 (*)     All other components within normal limits   URINALYSIS, REFLEX TO URINE CULTURE - Abnormal; Notable for the following components:    Urobilinogen, UA 2.0-3.0 (*)     All other components within normal limits    Narrative:     Preferred Collection Type->Urine, Clean Catch   PROTIME-INR - Abnormal; Notable for the following components:    Prothrombin Time 14.2 (*)     INR 1.4 (*)     All other components within normal limits   AMMONIA - Abnormal; Notable for the following components:    Ammonia 205 (*)     All other components within normal limits   CK   TROPONIN I   SARS-COV-2 RNA AMPLIFICATION, QUAL    Narrative:     What symptom criteria does the patient meet?->Other (specify)  Please specify:->ams   PROCALCITONIN   MAGNESIUM   DRUG SCREEN PANEL, URINE EMERGENCY    Narrative:     Preferred Collection Type->Urine, Clean Catch   TSH   ALCOHOL,MEDICAL (ETHANOL)   APTT        All Lab Results:  Results for orders placed or performed during the hospital encounter of 04/21/20   Blood culture x two cultures. Draw prior to antibiotics.   Result Value Ref Range    Blood Culture, Routine No Growth to date     Blood Culture, Routine No Growth to date    Blood culture x two cultures. Draw prior to antibiotics.   Result Value Ref Range    Blood Culture, Routine No Growth to date     Blood Culture, Routine No Growth to date    CBC auto differential   Result Value Ref Range    WBC 2.71 (L) 3.90 - 12.70 K/uL    RBC 3.44 (L)  4.00 - 5.40 M/uL    Hemoglobin 10.9 (L) 12.0 - 16.0 g/dL    Hematocrit 32.9 (L) 37.0 - 48.5 %    Mean Corpuscular Volume 96 82 - 98 fL    Mean Corpuscular Hemoglobin 31.7 (H) 27.0 - 31.0 pg    Mean Corpuscular Hemoglobin Conc 33.1 32.0 - 36.0 g/dL    RDW 15.5 (H) 11.5 - 14.5 %    Platelets 78 (L) 150 - 350 K/uL    MPV 11.1 9.2 - 12.9 fL    Immature Granulocytes 0.7 (H) 0.0 - 0.5 %    Gran # (ANC) 1.5 (L) 1.8 - 7.7 K/uL    Immature Grans (Abs) 0.02 0.00 - 0.04 K/uL    Lymph # 0.5 (L) 1.0 - 4.8 K/uL    Mono # 0.5 0.3 - 1.0 K/uL    Eos # 0.1 0.0 - 0.5 K/uL    Baso # 0.03 0.00 - 0.20 K/uL    nRBC 0 0 /100 WBC    Gran% 56.1 38.0 - 73.0 %    Lymph% 18.1 18.0 - 48.0 %    Mono% 19.9 (H) 4.0 - 15.0 %    Eosinophil% 4.1 0.0 - 8.0 %    Basophil% 1.1 0.0 - 1.9 %    Differential Method Automated    Comprehensive metabolic panel   Result Value Ref Range    Sodium 139 136 - 145 mmol/L    Potassium 3.8 3.5 - 5.1 mmol/L    Chloride 106 95 - 110 mmol/L    CO2 21 (L) 23 - 29 mmol/L    Glucose 116 (H) 70 - 110 mg/dL    BUN, Bld 12 8 - 23 mg/dL    Creatinine 0.9 0.5 - 1.4 mg/dL    Calcium 8.0 (L) 8.7 - 10.5 mg/dL    Total Protein 7.1 6.0 - 8.4 g/dL    Albumin 2.6 (L) 3.5 - 5.2 g/dL    Total Bilirubin 1.5 (H) 0.1 - 1.0 mg/dL    Alkaline Phosphatase 159 (H) 55 - 135 U/L    AST 57 (H) 10 - 40 U/L    ALT 35 10 - 44 U/L    Anion Gap 12 8 - 16 mmol/L    eGFR if African American >60 >60 mL/min/1.73 m^2    eGFR if non African American >60 >60 mL/min/1.73 m^2   C-Reactive Protein   Result Value Ref Range    CRP 11.1 (H) 0.0 - 8.2 mg/L   Ferritin   Result Value Ref Range    Ferritin 138 20.0 - 300.0 ng/mL   Lactate Dehydrogenase   Result Value Ref Range     (H) 110 - 260 U/L   CK   Result Value Ref Range    CPK 70 20 - 180 U/L   Lactic Acid, Plasma   Result Value Ref Range    Lactate (Lactic Acid) 2.6 (H) 0.5 - 2.2 mmol/L   Troponin I   Result Value Ref Range    Troponin I 0.013 0.000 - 0.026 ng/mL   COVID-19 Rapid Screening   Result Value  Ref Range    SARS-CoV-2 RNA, Amplification, Qual Negative Negative   Procalcitonin   Result Value Ref Range    Procalcitonin 0.08 <0.25 ng/mL   Magnesium   Result Value Ref Range    Magnesium 1.9 1.6 - 2.6 mg/dL   Acetaminophen level   Result Value Ref Range    Acetaminophen (Tylenol), Serum <3.0 (L) 10.0 - 20.0 ug/mL   Drug screen panel, emergency   Result Value Ref Range    Benzodiazepines Negative     Methadone metabolites Negative     Cocaine (Metab.) Negative     Opiate Scrn, Ur Negative     Barbiturate Screen, Ur Negative     Amphetamine Screen, Ur Presumptive Positive     THC Negative     Phencyclidine Negative     Creatinine, Random Ur 108.8 15.0 - 325.0 mg/dL    Toxicology Information SEE COMMENT    TSH   Result Value Ref Range    TSH 0.710 0.400 - 4.000 uIU/mL   Urinalysis, Reflex to Urine Culture Urine, Clean Catch   Result Value Ref Range    Specimen UA Urine, Catheterized     Color, UA Yellow Yellow, Straw, Laure    Appearance, UA Clear Clear    pH, UA 7.0 5.0 - 8.0    Specific Gravity, UA 1.015 1.005 - 1.030    Protein, UA Negative Negative    Glucose, UA Negative Negative    Ketones, UA Negative Negative    Bilirubin (UA) Negative Negative    Occult Blood UA Negative Negative    Nitrite, UA Negative Negative    Urobilinogen, UA 2.0-3.0 (A) <2.0 EU/dL    Leukocytes, UA Negative Negative   Ethanol   Result Value Ref Range    Alcohol, Medical, Serum <10 <10 mg/dL   APTT   Result Value Ref Range    aPTT 28.9 21.0 - 32.0 sec   Protime-INR   Result Value Ref Range    Prothrombin Time 14.2 (H) 9.0 - 12.5 sec    INR 1.4 (H) 0.8 - 1.2   Ammonia   Result Value Ref Range    Ammonia 205 (H) 10 - 50 umol/L   Comprehensive metabolic panel   Result Value Ref Range    Sodium 138 136 - 145 mmol/L    Potassium 3.9 3.5 - 5.1 mmol/L    Chloride 107 95 - 110 mmol/L    CO2 24 23 - 29 mmol/L    Glucose 108 70 - 110 mg/dL    BUN, Bld 11 8 - 23 mg/dL    Creatinine 0.8 0.5 - 1.4 mg/dL    Calcium 8.1 (L) 8.7 - 10.5 mg/dL     Total Protein 7.1 6.0 - 8.4 g/dL    Albumin 2.6 (L) 3.5 - 5.2 g/dL    Total Bilirubin 2.0 (H) 0.1 - 1.0 mg/dL    Alkaline Phosphatase 145 (H) 55 - 135 U/L    AST 54 (H) 10 - 40 U/L    ALT 35 10 - 44 U/L    Anion Gap 7 (L) 8 - 16 mmol/L    eGFR if African American >60 >60 mL/min/1.73 m^2    eGFR if non African American >60 >60 mL/min/1.73 m^2   CBC auto differential   Result Value Ref Range    WBC 2.36 (L) 3.90 - 12.70 K/uL    RBC 3.42 (L) 4.00 - 5.40 M/uL    Hemoglobin 10.9 (L) 12.0 - 16.0 g/dL    Hematocrit 33.2 (L) 37.0 - 48.5 %    Mean Corpuscular Volume 97 82 - 98 fL    Mean Corpuscular Hemoglobin 31.9 (H) 27.0 - 31.0 pg    Mean Corpuscular Hemoglobin Conc 32.8 32.0 - 36.0 g/dL    RDW 15.7 (H) 11.5 - 14.5 %    Platelets 71 (L) 150 - 350 K/uL    MPV 11.3 9.2 - 12.9 fL    Immature Granulocytes 0.8 (H) 0.0 - 0.5 %    Gran # (ANC) 1.4 (L) 1.8 - 7.7 K/uL    Immature Grans (Abs) 0.02 0.00 - 0.04 K/uL    Lymph # 0.4 (L) 1.0 - 4.8 K/uL    Mono # 0.5 0.3 - 1.0 K/uL    Eos # 0.0 0.0 - 0.5 K/uL    Baso # 0.02 0.00 - 0.20 K/uL    nRBC 0 0 /100 WBC    Gran% 59.0 38.0 - 73.0 %    Lymph% 17.8 (L) 18.0 - 48.0 %    Mono% 19.9 (H) 4.0 - 15.0 %    Eosinophil% 1.7 0.0 - 8.0 %    Basophil% 0.8 0.0 - 1.9 %    Differential Method Automated    Protime-INR   Result Value Ref Range    Prothrombin Time 14.4 (H) 9.0 - 12.5 sec    INR 1.4 (H) 0.8 - 1.2   Magnesium   Result Value Ref Range    Magnesium 1.9 1.6 - 2.6 mg/dL   Phosphorus   Result Value Ref Range    Phosphorus 3.1 2.7 - 4.5 mg/dL   Comprehensive metabolic panel   Result Value Ref Range    Sodium 140 136 - 145 mmol/L    Potassium 3.3 (L) 3.5 - 5.1 mmol/L    Chloride 108 95 - 110 mmol/L    CO2 23 23 - 29 mmol/L    Glucose 105 70 - 110 mg/dL    BUN, Bld 14 8 - 23 mg/dL    Creatinine 0.9 0.5 - 1.4 mg/dL    Calcium 7.6 (L) 8.7 - 10.5 mg/dL    Total Protein 6.7 6.0 - 8.4 g/dL    Albumin 2.4 (L) 3.5 - 5.2 g/dL    Total Bilirubin 2.3 (H) 0.1 - 1.0 mg/dL    Alkaline Phosphatase 121 55 -  135 U/L    AST 50 (H) 10 - 40 U/L    ALT 33 10 - 44 U/L    Anion Gap 9 8 - 16 mmol/L    eGFR if African American >60 >60 mL/min/1.73 m^2    eGFR if non African American >60 >60 mL/min/1.73 m^2   CBC auto differential   Result Value Ref Range    WBC 3.26 (L) 3.90 - 12.70 K/uL    RBC 3.38 (L) 4.00 - 5.40 M/uL    Hemoglobin 10.8 (L) 12.0 - 16.0 g/dL    Hematocrit 33.1 (L) 37.0 - 48.5 %    Mean Corpuscular Volume 98 82 - 98 fL    Mean Corpuscular Hemoglobin 32.0 (H) 27.0 - 31.0 pg    Mean Corpuscular Hemoglobin Conc 32.6 32.0 - 36.0 g/dL    RDW 15.8 (H) 11.5 - 14.5 %    Platelets 86 (L) 150 - 350 K/uL    MPV 11.4 9.2 - 12.9 fL    Immature Granulocytes 0.9 (H) 0.0 - 0.5 %    Gran # (ANC) 1.5 (L) 1.8 - 7.7 K/uL    Immature Grans (Abs) 0.03 0.00 - 0.04 K/uL    Lymph # 0.9 (L) 1.0 - 4.8 K/uL    Mono # 0.7 0.3 - 1.0 K/uL    Eos # 0.1 0.0 - 0.5 K/uL    Baso # 0.05 0.00 - 0.20 K/uL    nRBC 0 0 /100 WBC    Gran% 45.4 38.0 - 73.0 %    Lymph% 26.7 18.0 - 48.0 %    Mono% 21.2 (H) 4.0 - 15.0 %    Eosinophil% 4.3 0.0 - 8.0 %    Basophil% 1.5 0.0 - 1.9 %    Differential Method Automated    Protime-INR   Result Value Ref Range    Prothrombin Time 14.3 (H) 9.0 - 12.5 sec    INR 1.4 (H) 0.8 - 1.2   Ammonia   Result Value Ref Range    Ammonia 78 (H) 10 - 50 umol/L     Imaging Results:  Imaging Results          X-Ray Chest 1 View (Final result)  Result time 04/22/20 07:21:26   Procedure changed from X-ray Abdomen for NG Tube Placement (Nursing should notify Radiology after placement)     Final result by Erick Hou MD (04/22/20 07:21:26)                 Impression:      In comparison to the prior study, there is no adverse interval changes      Electronically signed by: Erick Hou MD  Date:    04/22/2020  Time:    07:21             Narrative:    EXAMINATION:  XR CHEST 1 VIEW    CLINICAL HISTORY:  NG tube;    TECHNIQUE:  Single frontal view of the chest was performed.    COMPARISON:  04/22/2020    FINDINGS:  Nasogastric tube  appears satisfactory.  In comparison to the prior study, there is no adverse interval changes                               CT Abdomen Pelvis With Contrast (Final result)  Result time 04/22/20 07:03:34    Final result by Erick Hou MD (04/22/20 07:03:34)                 Impression:      Severe thickening of the cecum, ascending and transverse colon concerning for infectious or inflammatory colitis.  Findings can also be seen in the setting of portal colopathy.    Portal gastropathy or gastritis also suspected.    Multiple gallstones with mild gallbladder distension. Mild wall thickening.  Right upper quadrant ultrasound or HIDA scan can be obtained as clinically warranted.    Large volume of ascites throughout the abdomen.  Findings of cirrhosis and portal hypertension noted.    All CT scans at this facility use dose modulation, iterative reconstruction, and/or weight based dosing when appropriate to reduce radiation dose to as low as reasonable achievable.      Electronically signed by: Erick Hou MD  Date:    04/22/2020  Time:    07:03             Narrative:    EXAMINATION:  CT ABDOMEN PELVIS WITH CONTRAST    CLINICAL HISTORY:  Abdominal distension;    TECHNIQUE:  Low dose axial images, sagittal and coronal reformations were obtained from the lung bases to the pubic symphysis following the IV administration of 75 mL of Omnipaque 350.  Oral contrast was not administered.    COMPARISON:  04/22/2020    FINDINGS:  Heart: Normal size. No effusion.    Lung Bases: Clear.    Liver: Nodular configuration liver consistent with cirrhosis.  No liver mass is seen.  Portal vein is patent.    Gallbladder: Multiple gallstones with mild gallbladder distension.  Mild wall thickening.    Bile Ducts: No dilatation.    Pancreas: Atrophic.  No mass. No peripancreatic fat stranding.    Spleen: Moderate splenomegaly measuring 18 cm.    Adrenals: Normal.    Kidneys/Ureters: Normal enhancement.  No mass or   hydroureteronephrosis.    Bladder: Collapsed    Reproductive organs: Uterus is not seen.    GI Tract/Mesentery: Moderate hiatal hernia.  Gastric rugal fold thickening and hyperemia noted consistent with gastritis.  Small duodenal diverticulum noted along the 2nd portion the duodenum.  No evidence of small-bowel obstruction however there is mild mucosal thickening throughout distal small bowel loops.  Significant thickening is seen at the level of the cecum, ascending and transverse colon.  Moderate constipation seen throughout the distal transverse and descending colon.  No evidence of appendicitis.    Peritoneal Space: Large amount of ascites seen throughout the abdomen and pelvis.    Retroperitoneum: Shotty retroperitoneal adenopathy.    Abdominal wall: Diffuse anasarca.    Vasculature: No aneurysm. Aorta demonstrates moderate atherosclerotic disease.    Bones: Postoperative changes are seen within the right hemipelvis.  Diffuse osteopenia noted.  Compression deformities throughout the thoracic and lumbar spine.  MRI can be obtained as clinically warranted.  Moderate degenerative changes lower lumbar spine.  Remote right-sided rib fracture noted.                               CT Head Without Contrast (Final result)  Result time 04/22/20 07:09:17    Final result by Erick Hou MD (04/22/20 07:09:17)                 Impression:      Chronic microvascular ischemic changes.    All CT scans at this facility use dose modulation, iterative reconstruction, and/or weight based dosing when appropriate to reduce radiation dose to as low as reasonable achievable.      Electronically signed by: Erick Hou MD  Date:    04/22/2020  Time:    07:09             Narrative:    EXAMINATION:  CT HEAD WITHOUT CONTRAST    CLINICAL HISTORY:  Confusion/delirium, altered LOC, unexplained;    TECHNIQUE:  Low dose axial CT images obtained throughout the head without intravenous contrast. Sagittal and coronal reconstructions were  performed.    COMPARISON:  None.    FINDINGS:  Intracranial compartment:    The brain parenchyma demonstrates areas of decreased attenuation with mild to moderate periventricular white matter consistent with chronic microvascular ischemic changes..  No parenchymal mass, hemorrhage, edema or major vascular distribution infarct.  Vascular calcifications are noted.    Mild prominence of the sulci and ventricles are consistent with age-related involutional changes.    No extra-axial blood or fluid collections.    Skull/extracranial contents (limited evaluation): No fracture. Mastoid air cells and paranasal sinuses are essentially clear.                               X-Ray Abdomen AP 1 View (KUB) (Final result)  Result time 04/22/20 07:29:28    Final result by Erick Hou MD (04/22/20 07:29:28)                 Impression:      Nondiagnostic study.  Correlate with CT scan same date of service.      Electronically signed by: Erick Hou MD  Date:    04/22/2020  Time:    07:29             Narrative:    EXAMINATION:  XR ABDOMEN AP 1 VIEW    CLINICAL HISTORY:  Abdominal distension (gaseous)    TECHNIQUE:  Single view of the abdomen was performed.    COMPARISON:  CT 04/22/2020.    FINDINGS:  Nondiagnostic study with significant motion limitation.                               X-Ray Chest AP Portable (Final result)  Result time 04/22/20 07:20:55    Final result by Erick Hou MD (04/22/20 07:20:55)                 Impression:      Subtle ground-glass opacity within the right midlung zone could reflect early airspace disease.  Continued follow-up is recommended.      Electronically signed by: Erick Hou MD  Date:    04/22/2020  Time:    07:20             Narrative:    EXAMINATION:  XR CHEST AP PORTABLE    CLINICAL HISTORY:  Suspected Covid-19 Virus Infection;    FINDINGS:  Single view of the chest.  Aorta demonstrates atherosclerotic disease.    Cardiac silhouette is normal.  No consolidation.  Subtle ground-glass  opacity within the right midlung zone could reflect early airspace disease.  Atelectasis seen at the left lung base.  No evidence of pleural effusion or pneumothorax.  Bones demonstrate degenerative changes.                               RADIOLOGY REPORT (Final result)  Result time 04/23/20 14:43:31    Final result by Unknown User (04/23/20 14:43:31)                                 12:22 AM: Per ED physician, pt's CXR results: No acute process.    12:23 AM: Per ED physician, pt's Abdominal XR results: Movement artifact.    3:35 AM: Per STAT radiology, pt's CT Head results:   1. The study is limited by motion.  2. Slightly asymmetric bilateral basal calcifications, more prominent on the right. Mild chronic small vessel ischemic change.    3:43 AM: Per STAT radiology, pt's CT Abdomen results:   1. Large amount of ascites.  2. Nodular contour liver suggestive cirrhosis.  3. Cholelithiasis with gallbladder wall thickening gallbladder distention. The gallbladder wall thickening is nonspecific in the setting or cirrhosis, clinically correlate for cholecystitis.  4. Extensive thickening of the right hemicolon possible due to cirrhosis but difficult to exclude superimposed colitis. Gastric fold thciekning is also seen. Small hialtal hernia.  5. Multilevel degenerative changes of the spine. Chronic compression deformities of T7, T11, L1, L2, L3, and L4. Degenerative endplate sclerosis of L5/S1. Posterior disc disease suspected at L4/L5 and L5/S1.             The Emergency Provider reviewed the vital signs and test results, which are outlined above.    ED Discussion     12:25 AM: Reviewed pt's previous documentation, which states that the pt did not fill her last Rifaximin prescription.    3:52 AM: Discussed case with Brittany Horner NP ([facility]). Dr. Rey agrees with current care and management of pt and accepts admission.   Admitting Service: Hospital Medicine  Admitting Physician: Dr. Rey  Admit to:  Inpatient Standard Bed    3:53 AM: Re-evaluated pt. I have discussed test results, shared treatment plan, and the need for admission with family. Family expresses understanding at this time and agree with all information. All questions answered. Family has no further questions or concerns at this time. Pt is ready for admit.         ED Medication(s):  Medications   lorazepam (ATIVAN) injection 1 mg (1 mg Intravenous Given 4/22/20 0012)   lorazepam (ATIVAN) injection 2 mg (2 mg Intravenous Given 4/22/20 0057)   lactulose 20 gram/30 mL solution Soln 30 g (30 g Oral Given 4/22/20 0112)   iohexoL (OMNIPAQUE 350) injection 100 mL (75 mLs Intravenous Given 4/22/20 0245)   piperacillin-tazobactam 4.5 g in dextrose 5 % 100 mL IVPB (ready to mix system) (0 g Intravenous Stopped 4/22/20 0438)   sodium chloride 0.9% bolus 1,000 mL (1,000 mLs Intravenous New Bag 4/22/20 0423)     Discharge Medication List as of 4/23/2020 10:53 AM        Follow-up Information     Alma Delia Green MD In 4 days.    Specialties:  Gastroenterology, Hepatology  Why:  For re-evaluation of liver.  Contact information:  [address]  [place] [address] [phone]             [facility] HEMATOLOGY/ONCOLOGY. Schedule an appointment as soon as possible for a visit in 1 month.    Specialty:  Hematology and Oncology  Why:  For re-evaluation of low platelets and low blood counts.  Contact information:  [address] [facility] [address] [phone]           Primary Care Provider of Your Choice. Schedule an appointment as soon as possible for a visit in 4 days.    Why:  For re-evaluation of chronic medical problems.           Call Outpatient Drug Rehab Program of Your Choice.    Why:  For treatment of drug dependency  Contact information:  Refer to information provided in Patient Instructions on After Visit Summary.                   Medical Decision Making    Medical Decision Making:   Clinical Tests:   Lab Tests: Ordered and  "Reviewed  Radiological Study: Ordered and Reviewed           Scribe Attestation:   Scribe #1: I performed the above scribed service and the documentation accurately describes the services I performed. I attest to the accuracy of the note.    Attending:   Physician Attestation Statement for Scribe #1: I, Jazmine Orozco MD, personally performed the services described in this documentation, as scribed by Brant Messer, in my presence, and it is both accurate and complete.          Clinical Impression       ICD-10-CM ICD-9-CM   1. Hepatic encephalopathy K72.90 572.2   2. Suspected Covid-19 Virus Infection R68.89    3. Abdominal distention R14.0 787.3   4. Other ascites R18.8 789.59   5. Pancytopenia D61.818 284.19   6. Chronic bilateral low back pain without sciatica M54.5 724.2    G89.29 338.29       Disposition:   Disposition: Admitted  Condition: Fair    Portions of this note may have been created with voice recognition software. Occasional "wrong-word" or "sound-a-like" substitutions may have occurred due to the inherent limitations of voice recognition software. Please, read the note carefully and recognize, using context, where substitutions have occurred.          Jazmine Orozco MD  04/24/20 0340    "

## 2020-04-22 NOTE — NURSING
Skin tear left hand with a c/d/i dsg noted on assessment.  Report from Jazmine was pt had this dsg when she came from ed.

## 2020-04-22 NOTE — PLAN OF CARE
Awake and alert, improved today  Ambulates with assist x1  Bedside paracentesis today, 2L removed  Meds per ng tube

## 2020-04-23 ENCOUNTER — TELEPHONE (OUTPATIENT)
Dept: GASTROENTEROLOGY | Facility: CLINIC | Age: 60
End: 2020-04-23

## 2020-04-23 VITALS
BODY MASS INDEX: 25.55 KG/M2 | HEART RATE: 70 BPM | TEMPERATURE: 98 F | SYSTOLIC BLOOD PRESSURE: 111 MMHG | OXYGEN SATURATION: 98 % | WEIGHT: 138.88 LBS | RESPIRATION RATE: 18 BRPM | DIASTOLIC BLOOD PRESSURE: 58 MMHG | HEIGHT: 62 IN

## 2020-04-23 PROBLEM — E83.51 HYPOCALCEMIA: Status: ACTIVE | Noted: 2020-04-23

## 2020-04-23 PROBLEM — M54.50 CHRONIC BILATERAL LOW BACK PAIN WITHOUT SCIATICA: Chronic | Status: ACTIVE | Noted: 2020-04-23

## 2020-04-23 PROBLEM — D61.818 PANCYTOPENIA: Chronic | Status: ACTIVE | Noted: 2020-02-05

## 2020-04-23 PROBLEM — G89.29 CHRONIC BILATERAL LOW BACK PAIN WITHOUT SCIATICA: Chronic | Status: ACTIVE | Noted: 2020-04-23

## 2020-04-23 PROBLEM — F11.10 OPIOID ABUSE: Chronic | Status: ACTIVE | Noted: 2020-04-23

## 2020-04-23 PROBLEM — E87.6 HYPOKALEMIA: Status: ACTIVE | Noted: 2020-04-23

## 2020-04-23 PROBLEM — D61.818 OTHER PANCYTOPENIA: Status: ACTIVE | Noted: 2020-04-23

## 2020-04-23 PROBLEM — K76.82 HEPATIC ENCEPHALOPATHY: Status: RESOLVED | Noted: 2020-04-22 | Resolved: 2020-04-23

## 2020-04-23 PROBLEM — D64.89 OTHER SPECIFIED ANEMIAS: Status: ACTIVE | Noted: 2020-04-23

## 2020-04-23 PROBLEM — D69.6 THROMBOCYTOPENIA, UNSPECIFIED: Status: ACTIVE | Noted: 2020-04-23

## 2020-04-23 LAB
ALBUMIN SERPL BCP-MCNC: 2.4 G/DL (ref 3.5–5.2)
ALP SERPL-CCNC: 121 U/L (ref 55–135)
ALT SERPL W/O P-5'-P-CCNC: 33 U/L (ref 10–44)
AMMONIA PLAS-SCNC: 78 UMOL/L (ref 10–50)
ANION GAP SERPL CALC-SCNC: 9 MMOL/L (ref 8–16)
AST SERPL-CCNC: 50 U/L (ref 10–40)
BASOPHILS # BLD AUTO: 0.05 K/UL (ref 0–0.2)
BASOPHILS NFR BLD: 1.5 % (ref 0–1.9)
BILIRUB SERPL-MCNC: 2.3 MG/DL (ref 0.1–1)
BUN SERPL-MCNC: 14 MG/DL (ref 8–23)
CALCIUM SERPL-MCNC: 7.6 MG/DL (ref 8.7–10.5)
CHLORIDE SERPL-SCNC: 108 MMOL/L (ref 95–110)
CO2 SERPL-SCNC: 23 MMOL/L (ref 23–29)
CREAT SERPL-MCNC: 0.9 MG/DL (ref 0.5–1.4)
DIFFERENTIAL METHOD: ABNORMAL
EOSINOPHIL # BLD AUTO: 0.1 K/UL (ref 0–0.5)
EOSINOPHIL NFR BLD: 4.3 % (ref 0–8)
ERYTHROCYTE [DISTWIDTH] IN BLOOD BY AUTOMATED COUNT: 15.8 % (ref 11.5–14.5)
EST. GFR  (AFRICAN AMERICAN): >60 ML/MIN/1.73 M^2
EST. GFR  (NON AFRICAN AMERICAN): >60 ML/MIN/1.73 M^2
GLUCOSE SERPL-MCNC: 105 MG/DL (ref 70–110)
HCT VFR BLD AUTO: 33.1 % (ref 37–48.5)
HGB BLD-MCNC: 10.8 G/DL (ref 12–16)
IMM GRANULOCYTES # BLD AUTO: 0.03 K/UL (ref 0–0.04)
IMM GRANULOCYTES NFR BLD AUTO: 0.9 % (ref 0–0.5)
INR PPP: 1.4 (ref 0.8–1.2)
LYMPHOCYTES # BLD AUTO: 0.9 K/UL (ref 1–4.8)
LYMPHOCYTES NFR BLD: 26.7 % (ref 18–48)
MCH RBC QN AUTO: 32 PG (ref 27–31)
MCHC RBC AUTO-ENTMCNC: 32.6 G/DL (ref 32–36)
MCV RBC AUTO: 98 FL (ref 82–98)
MONOCYTES # BLD AUTO: 0.7 K/UL (ref 0.3–1)
MONOCYTES NFR BLD: 21.2 % (ref 4–15)
NEUTROPHILS # BLD AUTO: 1.5 K/UL (ref 1.8–7.7)
NEUTROPHILS NFR BLD: 45.4 % (ref 38–73)
NRBC BLD-RTO: 0 /100 WBC
PLATELET # BLD AUTO: 86 K/UL (ref 150–350)
PMV BLD AUTO: 11.4 FL (ref 9.2–12.9)
POTASSIUM SERPL-SCNC: 3.3 MMOL/L (ref 3.5–5.1)
PROT SERPL-MCNC: 6.7 G/DL (ref 6–8.4)
PROTHROMBIN TIME: 14.3 SEC (ref 9–12.5)
RBC # BLD AUTO: 3.38 M/UL (ref 4–5.4)
SODIUM SERPL-SCNC: 140 MMOL/L (ref 136–145)
WBC # BLD AUTO: 3.26 K/UL (ref 3.9–12.7)

## 2020-04-23 PROCEDURE — 85610 PROTHROMBIN TIME: CPT

## 2020-04-23 PROCEDURE — 80053 COMPREHEN METABOLIC PANEL: CPT

## 2020-04-23 PROCEDURE — 82140 ASSAY OF AMMONIA: CPT

## 2020-04-23 PROCEDURE — 99233 SBSQ HOSP IP/OBS HIGH 50: CPT | Mod: ,,, | Performed by: INTERNAL MEDICINE

## 2020-04-23 PROCEDURE — 25000003 PHARM REV CODE 250: Performed by: FAMILY MEDICINE

## 2020-04-23 PROCEDURE — 85025 COMPLETE CBC W/AUTO DIFF WBC: CPT

## 2020-04-23 PROCEDURE — 99233 PR SUBSEQUENT HOSPITAL CARE,LEVL III: ICD-10-PCS | Mod: ,,, | Performed by: INTERNAL MEDICINE

## 2020-04-23 PROCEDURE — 25000003 PHARM REV CODE 250: Performed by: NURSE PRACTITIONER

## 2020-04-23 PROCEDURE — 36415 COLL VENOUS BLD VENIPUNCTURE: CPT

## 2020-04-23 RX ORDER — METOPROLOL TARTRATE 25 MG/1
25 TABLET, FILM COATED ORAL 2 TIMES DAILY
Qty: 60 TABLET | Refills: 0 | Status: SHIPPED | OUTPATIENT
Start: 2020-04-23

## 2020-04-23 RX ORDER — CIPROFLOXACIN 500 MG/1
TABLET ORAL
Qty: 30 TABLET | Refills: 0 | Status: SHIPPED | OUTPATIENT
Start: 2020-04-23 | End: 2020-05-11

## 2020-04-23 RX ORDER — LACTULOSE 10 G/15ML
20 SOLUTION ORAL EVERY 6 HOURS
Status: DISCONTINUED | OUTPATIENT
Start: 2020-04-23 | End: 2020-04-23

## 2020-04-23 RX ORDER — LACTULOSE 10 G/15ML
10 SOLUTION ORAL 2 TIMES DAILY
Status: DISCONTINUED | OUTPATIENT
Start: 2020-04-23 | End: 2020-04-23 | Stop reason: HOSPADM

## 2020-04-23 RX ORDER — LACTULOSE 10 G/15ML
10 SOLUTION ORAL 3 TIMES DAILY
Qty: 1350 ML | Refills: 0 | Status: SHIPPED | OUTPATIENT
Start: 2020-04-23

## 2020-04-23 RX ADMIN — CIPROFLOXACIN 750 MG: 750 TABLET, FILM COATED ORAL at 09:04

## 2020-04-23 RX ADMIN — PANTOPRAZOLE SODIUM 40 MG: 40 TABLET, DELAYED RELEASE ORAL at 09:04

## 2020-04-23 RX ADMIN — METOPROLOL TARTRATE 25 MG: 25 TABLET ORAL at 09:04

## 2020-04-23 RX ADMIN — RIFAXIMIN 550 MG: 550 TABLET ORAL at 09:04

## 2020-04-23 RX ADMIN — FUROSEMIDE 40 MG: 40 TABLET ORAL at 09:04

## 2020-04-23 RX ADMIN — SPIRONOLACTONE 100 MG: 25 TABLET ORAL at 09:04

## 2020-04-23 RX ADMIN — LACTULOSE 20 G: 20 SOLUTION ORAL at 05:04

## 2020-04-23 NOTE — SUBJECTIVE & OBJECTIVE
Review of Systems   Unable to perform ROS: Mental status change     Objective:     Vital Signs (Most Recent):  Temp: 98 °F (36.7 °C) (04/22/20 1659)  Pulse: 92 (04/22/20 1808)  Resp: 18 (04/22/20 1659)  BP: 120/66 (04/22/20 1659)  SpO2: 95 % (04/22/20 1659) Vital Signs (24h Range):  Temp:  [97.1 °F (36.2 °C)-98.4 °F (36.9 °C)] 98 °F (36.7 °C)  Pulse:  [] 92  Resp:  [16-20] 18  SpO2:  [95 %-100 %] 95 %  BP: (105-152)/(56-84) 120/66     Weight: 56.4 kg (124 lb 5.4 oz)  Body mass index is 22.74 kg/m².    Intake/Output Summary (Last 24 hours) at 4/22/2020 1909  Last data filed at 4/22/2020 1900  Gross per 24 hour   Intake 160 ml   Output --   Net 160 ml      Physical Exam   HENT:   Mouth/Throat: Oropharynx is clear and moist.   Eyes: Scleral icterus is present.   Cardiovascular: Normal rate and regular rhythm.   Pulmonary/Chest: Effort normal and breath sounds normal. No respiratory distress. She has no wheezes.   Abdominal: She exhibits distension. Tenderness:  minimal tenderness. There is no guarding.   Neurological:   She is somnolent, but she will arouse to verbal stimuli. She is oriented to self at best. She will follow simple commands (squeeze my fingers). She will not follow other commands such as raise one finger.   Skin: Skin is warm and dry.       Significant Labs: All pertinent labs within the past 24 hours have been reviewed.    Significant Imaging: I have reviewed all pertinent imaging results/findings within the past 24 hours.

## 2020-04-23 NOTE — PLAN OF CARE
AAOx 4.   POC reviewed. Able to verbalize understanding, needs & follow commands.   Independent w/ repositioning in bed; educated on importance of changing position often; verbalized understanding.   NG tube removed. Pt tolerated eating well.  Positive for ascites.  C/O chronic back pain; has not requested pain med.   Afebrile.  NSR on cardiac monitor.   NADN @ this time.  Free of falls.   SR up x2; bed low & locked. Call light w/in reach. Hourly rounding complete.   All infection control & isolation measures in place.   Will continue to monitor throughout shift.

## 2020-04-23 NOTE — ASSESSMENT & PLAN NOTE
"04/23/2020  ASSESSMENT: She is agreeable to outpatient drug rehab program.   PLAN:  Education and information provided. Refer to "Patient Instructions" section of after visit summary.  "

## 2020-04-23 NOTE — CONSULTS
"[facility] -   Gastroenterology  Consult Note    Patient Name: Jossy Siegel  MRN: [id number]  Admission Date: 4/21/2020  Hospital Length of Stay: 1 days  Code Status: Full Code   Attending Provider: CITLALLI Fay MD   Consulting Provider: Jessica Jo MD  Primary Care Physician: Primary Doctor No  Principal Problem:Hepatic encephalopathy    Inpatient consult to Gastroenterology  Consult performed by: Jessica Jo MD  Consult ordered by: ICTLALLI Fay MD        Subjective:     HPI: 59 y.o female with decompensated HCV cirrhosis treatment naive who was admitted to the hospital in the early morning hours yesterday after her family called EMS and stated she was sleeping all day. Complications from her liver disease includes hepatic encephalopathy, recurrent ascites, muscle wasting.     To evaluate her altered mental status the patient underwent a UDS and was positive for methamphetamine. Furthermore, there was documentation noting that the patient had not filled her prescription of rifaximin. In the ED she was found to be aggressive and administered a single dose of Ativan. Later a NG tube was placed and she was administered lactulose. A CT scan of the abd/pelvis with contrast identified no appendicitis, SBO or HCC. She was noted to have large bowel findings often seen in chronic liver disease. There was also a significant amount of stool throughout her colon. Moderate degenerative changes in the lower lumbar spine were also appreciated. Labs revealed a stable H&H of 10.9/33.2, INR 1.4 and mildly elevated liver enzymes which are her baseline.     A NG tube was placed and she was administered lactulose. Also narcotics, sleep aids and anxiolytics were avoided. Patient is seen this morning at her bedside. She is awake, lucid and alter to person, place and time. She admits to methamphetamine use at home. States it was her only time. Also admits to taking pain pills from "the streets" and " not prescribed to her for lower back pain. Pain is present all the time and worse when lying down. She admits to not adhering to a low salt diet. At present, she denies abdominal pain, melena, hematochezia or melena.      Past Medical History:   Diagnosis Date    Alcoholic cirrhosis of liver with ascites     Cirrhosis     Hepatitis        Past Surgical History:   Procedure Laterality Date    PARACENTESIS         Review of patient's allergies indicates:  No Known Allergies  Family History     None        Tobacco Use    Smoking status: Current Every Day Smoker     Packs/day: 0.25     Years: 45.00     Pack years: 11.25     Types: Cigarettes    Smokeless tobacco: Never Used   Substance and Sexual Activity    Alcohol use: Not Currently    Drug use: Never    Sexual activity: Not on file     Review of Systems  Objective:     Vital Signs (Most Recent):  Temp: 98.6 °F (37 °C) (04/23/20 0502)  Pulse: 73 (04/23/20 0537)  Resp: 17 (04/23/20 0502)  BP: (!) 112/58 (04/23/20 0502)  SpO2: 100 % (04/23/20 0502) Vital Signs (24h Range):  Temp:  [97.3 °F (36.3 °C)-99 °F (37.2 °C)] 98.6 °F (37 °C)  Pulse:  [] 73  Resp:  [16-20] 17  SpO2:  [93 %-100 %] 100 %  BP: (104-136)/(56-84) 112/58     Weight: 63 kg (138 lb 14.2 oz) (04/23/20 0537)  Body mass index is 25.4 kg/m².      Intake/Output Summary (Last 24 hours) at 4/23/2020 0731  Last data filed at 4/23/2020 0537  Gross per 24 hour   Intake 360 ml   Output 1100 ml   Net -740 ml       Lines/Drains/Airways     Peripheral Intravenous Line                 Peripheral IV - Single Lumen 04/21/20 2328 18 G Left 1 day         Peripheral IV - Single Lumen 04/22/20 0053 20 G Left Forearm 1 day                Physical Exam   Constitutional: She appears well-developed.       Significant Labs:  Recent Lab Results       04/23/20  0529        Albumin 2.4     Alkaline Phosphatase 121     ALT 33     Ammonia 78     Anion Gap 9     AST 50     Baso # 0.05     Basophil% 1.5     BILIRUBIN TOTAL  2.3  Comment:  For infants and newborns, interpretation of results should be based  on gestational age, weight and in agreement with clinical  observations.  Premature Infant recommended reference ranges:  Up to 24 hours.............<8.0 mg/dL  Up to 48 hours............<12.0 mg/dL  3-5 days..................<15.0 mg/dL  6-29 days.................<15.0 mg/dL       BUN, Bld 14     Calcium 7.6     Chloride 108     CO2 23     Creatinine 0.9     Differential Method Automated     eGFR if  >60     eGFR if non  >60  Comment:  Calculation used to obtain the estimated glomerular filtration  rate (eGFR) is the CKD-EPI equation.        Eos # 0.1     Eosinophil% 4.3     Glucose 105     Gran # (ANC) 1.5     Gran% 45.4     Hematocrit 33.1     Hemoglobin 10.8     Immature Grans (Abs) 0.03  Comment:  Mild elevation in immature granulocytes is non specific and   can be seen in a variety of conditions including stress response,   acute inflammation, trauma and pregnancy. Correlation with other   laboratory and clinical findings is essential.       Immature Granulocytes 0.9     INR 1.4  Comment:  Coumadin Therapy:  2.0 - 3.0 for INR for all indicators except mechanical heart valves  and antiphospholipid syndromes which should use 2.5 - 3.5.       Lymph # 0.9     Lymph% 26.7     MCH 32.0     MCHC 32.6     MCV 98     Mono # 0.7     Mono% 21.2     MPV 11.4     nRBC 0     Platelets 86     Potassium 3.3     PROTEIN TOTAL 6.7     Protime 14.3     RBC 3.38     RDW 15.8     Sodium 140     WBC 3.26           Significant Imaging:  Imaging results within the past 24 hours have been reviewed.    Assessment/Plan:     Active Diagnoses:    Diagnosis Date Noted POA    PRINCIPAL PROBLEM:  Hepatic encephalopathy [K72.90] 04/22/2020 Yes    Methamphetamine use [F15.10] 04/22/2020 Yes    Cirrhosis of liver with ascites related to hepatitis C virus [K74.60, R18.8] 02/17/2020 Yes     Chronic    Thrombocytopenia [D69.6]  02/05/2020 Yes      Problems Resolved During this Admission:       A: 59 y.o female with decompensated HCV cirrhosis, admitted for heaptic encephalopathy exacerbated by active illicit drug use.    MELD-Na score: 13 at 4/23/2020  5:29 AM  MELD score: 13 at 4/23/2020  5:29 AM  Calculated from:  Serum Creatinine: 0.9 mg/dL (Rounded to 1 mg/dL) at 4/23/2020  5:29 AM  Serum Sodium: 140 mmol/L (Rounded to 137 mmol/L) at 4/23/2020  5:29 AM  Total Bilirubin: 2.3 mg/dL at 4/23/2020  5:29 AM  INR(ratio): 1.4 at 4/23/2020  5:29 AM  Age: 59 years    Recommendations:  1. Lactulose BID  2. No anxiolytics, sleep aids or narcotics  3. Low NaCl diet (2g/day)  4. Continue diuretics at current doses  5. Cipro prophylaxis 2x/week  6. Outpatient variceal screening      Patient is NOT a liver transplant candidate with active drug use and should not be referred until she can prove sobriety. She will likely need to complete a chemical dependency program. Patient is aware of this information told to her today. Discussed importance of adhering to low salt diet and being compliant with medications. Recommendations discussed with Dr. Fay, \[facility] [place] [facility]\"" medicine. Patient may discharge home. Will arrange outpatient follow up with liver clinic.     Thank you for your consult. I will sign off. Please contact us if you have any additional questions.    Jessica Jo MD  Gastroenterology  [facility] -

## 2020-04-23 NOTE — ASSESSMENT & PLAN NOTE
04/23/2020  ASSESSMENT: Mild. Asymptomatic.   PLAN:  Stable for outpatient follow-up.    Recent Labs   Lab 04/16/20  1338 04/21/20  2357 04/22/20  0639 04/23/20  0529    139 138 140   K 3.5 3.8 3.9 3.3*    106 107 108   CO2 24 21* 24 23      Diuretics (From admission, onward)    Start     Stop Route Frequency Ordered    04/22/20 0900  furosemide tablet 40 mg      -- Oral 2 times daily 04/22/20 0459    04/22/20 0900  spironolactone tablet 100 mg      -- Oral 2 times daily 04/22/20 0459

## 2020-04-23 NOTE — TELEPHONE ENCOUNTER
----- Message from Jessica Jo MD sent at 4/23/2020  8:48 AM CDT -----  Patient admitted for hepatic encephalopathy and UDS positive for meth. Seen earlier this month by Dr. Green. Patient will need hospital follow up in 2-3 weeks and also needs variceal screening. See consult note for details.    Thanks  AN

## 2020-04-23 NOTE — NURSING
Written and verbal discharge instructions given, pt verbalizes understanding.  Meds reviewed, pt verbalizes understanding.  AAOx4; No s/s of distress.

## 2020-04-23 NOTE — PROGRESS NOTES
Pharmacist Renal Dose Adjustment Note    Jossy Siegel is a 59 y.o. female being treated with the medication ciprofloxacin.     Patient Data:    Vital Signs (Most Recent):  Temp: 99 °F (37.2 °C) (04/22/20 2050)  Pulse: 102 (04/22/20 2050)  Resp: 20 (04/22/20 2050)  BP: 114/61 (04/22/20 2050)  SpO2: (!) 93 % (04/22/20 2050)   Vital Signs (72h Range):  Temp:  [97.1 °F (36.2 °C)-99 °F (37.2 °C)]   Pulse:  []   Resp:  [16-20]   BP: (105-152)/(56-84)   SpO2:  [93 %-100 %]      Recent Labs   Lab 04/16/20  1338 04/21/20  2357 04/22/20  0639   CREATININE 0.8 0.9 0.8     Serum creatinine: 0.8 mg/dL 04/22/20 0639  Estimated creatinine clearance: 58.4 mL/min    Medication ciprofloxacin 500 mg PO daily will be changed to ciprofloxacin 750 mg PO every 12 hours per pharmacy renal dose adjustment protocol for patients with CrCl greater than 30 mL/min.    Pharmacist's Name: Lilian Esqueda PharmD  Pharmacist's Extension: [phone]    Thank you for allowing us to participate in this patient's care.     Lilian Esqueda PharmD 04/22/2020 9:12 PM

## 2020-04-23 NOTE — PLAN OF CARE
Recommendations    Recommendation: 1. Continue current diet 2. RD to f/u  Goals: Meet >85% EEN/EPN by RD f/u  Nutrition Goal Status: new  Communication of RD Recs: (POC)

## 2020-04-23 NOTE — ASSESSMENT & PLAN NOTE
04/23/2020  ASSESSMENT: Severe cirrhosis - stable. Ascites much improved after 2L paracentesis.   PLAN:  Outpatient follow-up.    MELD-Na score: 13 at 4/23/2020  5:29 AM  MELD score: 13 at 4/23/2020  5:29 AM  Calculated from:  Serum Creatinine: 0.9 mg/dL (Rounded to 1 mg/dL) at 4/23/2020  5:29 AM  Serum Sodium: 140 mmol/L (Rounded to 137 mmol/L) at 4/23/2020  5:29 AM  Total Bilirubin: 2.3 mg/dL at 4/23/2020  5:29 AM  INR(ratio): 1.4 at 4/23/2020  5:29 AM  Age: 59 years   Recent Labs   Lab 04/21/20  2357 04/22/20  0639 04/23/20  0529   AMMONIA 205*  --  78*   AST 57* 54* 50*   ALT 35 35 33   ALKPHOS 159* 145* 121   BILITOT 1.5* 2.0* 2.3*   INR 1.4* 1.4* 1.4*   APTT 28.9  --   --    PLT 78* 71* 86*   ALBUMIN 2.6* 2.6* 2.4*   LABPROT 14.2* 14.4* 14.3*       Intake/Output Summary (Last 24 hours) at 4/23/2020 0935  Last data filed at 4/23/2020 0537  Gross per 24 hour   Intake 360 ml   Output 1100 ml   Net -740 ml     Diuretics (From admission, onward)    Start     Stop Route Frequency Ordered    04/22/20 0900  furosemide tablet 40 mg      -- Oral 2 times daily 04/22/20 0459    04/22/20 0900  spironolactone tablet 100 mg      -- Oral 2 times daily 04/22/20 0459

## 2020-04-23 NOTE — DISCHARGE SUMMARY
[facility] Hospital Medicine  Discharge Summary      Patient Name: Jossy Siegel  MRN: [id number]  Admission Date: 4/21/2020  Hospital Length of Stay: 1 days  Discharge Date and Time:  04/23/2020 10:27 AM  Attending Physician: CITLALLI Fay MD   Discharging Provider: RYAN Fay MD  Primary Care Provider: Primary Doctor No      HPI:   Unable to obtain history from patient due to AMS.  HPI obtained from ED physician and past medical record.  Ms. Siegel is a 59 y.o. female with a PMHx of liver cirrhosis related to HCV with ascites requiring weekly paracentesis, tobacco use, and history of polysubstance abuse (snorting cocaine and meth).  She presented to the ED with c/o AMS x 1 day.  Patient's family called EMS stating patient had been sleeping all day and they thought her ammonia was likely elevated.  No associated symptoms.  Her last paracentesis was on 4/16 with 4L removed.  In the ED, patient became very agitated and combative requiring a total of 3 mg IV Ativan.  Initial work-up resulted ammonia of 205, AST 57, ALT 35, TBili 1.5, albumin 2.6, sodium 139, INR 1.4, platelets 78, lactic 2.6, UDS (+) for amphetamine, UA and CXR unremarkable, CT of the head negative for acute process, and CT of ABD/pelvis showed liver cirrhosis with large amount of ascites.  Patient became somnolent after IV Ativan given per ED, therefore, NG tube was placed for medication administration.  Patient received 1 L IV fluids and 30 g lactulose.  Hospital Medicine was called for admission.      * No surgery found *      Hospital Course:   04/22/2020 She is somnolent, but she will arouse to verbal stimuli. She is oriented to self at best. She will follow simple commands (squeeze my fingers). She will not follow other commands such as raise one finger. Abdomen is distended, minimally tender, but without guarding or rebound tenderness. She has a nasogastric tube in place. She had therapeutic paracentesis of 2L  "of colette ascitic fluid this afternoon.  04/23/2020 She is lucid and conversant this morning. Her ammonia level has dropped to 78. Her abdomen is much less distended. Nasogastric tube has been removed and she is able to eat and drink. She has been seen by GI. She is stable for discharge home with outpatient follow-up. I educated her about importance of low-sodium diet, outpatient drug-rehab, and follow-up with her hepatologist Dr. Green and a new PCP.     Consults:   Consults (From admission, onward)        Status Ordering Provider     Inpatient consult to Gastroenterology  Once     Provider:  Jessica Jo MD    Completed CITLALLI JIMÉNEZ     Inpatient consult to Interventional Radiology  Once     Provider:  RT Ranjit    Completed IRISH LOPEZ          * Hepatic encephalopathy-resolved as of 4/23/2020 04/23/2020  ASSESSMENT: Resolved. She is lucid and conversant this morning. She acknowledges compliance and drug use as complicating factor.   PLAN:  Education provided. Outpatient follow-up.      Opioid abuse  04/23/2020  ASSESSMENT: She says she gets opioid pain meds "from a friend." She is agreeable to outpatient drug rehab program.   PLAN:  Education and information provided. Refer to "Patient Instructions" section of after visit summary.      Hypokalemia  04/23/2020  ASSESSMENT: Mild. Asymptomatic.   PLAN:  Stable for outpatient follow-up.    Recent Labs   Lab 04/16/20  1338 04/21/20  2357 04/22/20  0639 04/23/20  0529    139 138 140   K 3.5 3.8 3.9 3.3*    106 107 108   CO2 24 21* 24 23      Diuretics (From admission, onward)    Start     Stop Route Frequency Ordered    04/22/20 0900  furosemide tablet 40 mg      -- Oral 2 times daily 04/22/20 0459    04/22/20 0900  spironolactone tablet 100 mg      -- Oral 2 times daily 04/22/20 0459             Hypocalcemia  04/23/2020  ASSESSMENT: Corrected calcium = 8.9 (WNL).   PLAN:  Stable for outpatient follow-up.    Recent Labs   Lab " "04/16/20 1338 04/21/20 2357 04/22/20  0639 04/23/20  0529    139 138 140   K 3.5 3.8 3.9 3.3*    106 107 108   CO2 24 21* 24 23      Recent Labs   Lab 04/16/20 1338 04/21/20 2357 04/22/20  0639 04/23/20  0529   CALCIUM 8.3* 8.0* 8.1* 7.6*   MG  --  1.9 1.9  --    PHOS  --   --  3.1  --    ALBUMIN  --  2.6* 2.6* 2.4*          Methamphetamine use  04/23/2020  ASSESSMENT: She is agreeable to outpatient drug rehab program.   PLAN:  Education and information provided. Refer to "Patient Instructions" section of after visit summary.    Pancytopenia  04/23/2020  ASSESSMENT: Chronic. Stable. Believed to be related to chronic liver disease.   PLAN:  Outpatient hematology consult. (She has seen hematology in the past.)    Recent Labs   Lab 04/21/20 2357 04/22/20  0639 04/23/20  0529   WBC 2.71* 2.36* 3.26*     Recent Labs   Lab 04/21/20 2357 04/22/20  0639 04/23/20  0529   HGB 10.9* 10.9* 10.8*   HCT 32.9* 33.2* 33.1*     Recent Labs   Lab 04/21/20 2357 04/22/20  0639 04/23/20  0529   PLT 78* 71* 86*     Recent Labs   Lab 04/21/20 2357 04/22/20  0639 04/23/20  0529   LABPROT 14.2* 14.4* 14.3*   INR 1.4* 1.4* 1.4*   APTT 28.9  --   --      Lab Results   Component Value Date    FERRITIN 138 04/21/2020         Cirrhosis of liver with ascites related to hepatitis C virus  04/23/2020  ASSESSMENT: Severe cirrhosis - stable. Ascites much improved after 2L paracentesis.   PLAN:  Outpatient follow-up.    MELD-Na score: 13 at 4/23/2020  5:29 AM  MELD score: 13 at 4/23/2020  5:29 AM  Calculated from:  Serum Creatinine: 0.9 mg/dL (Rounded to 1 mg/dL) at 4/23/2020  5:29 AM  Serum Sodium: 140 mmol/L (Rounded to 137 mmol/L) at 4/23/2020  5:29 AM  Total Bilirubin: 2.3 mg/dL at 4/23/2020  5:29 AM  INR(ratio): 1.4 at 4/23/2020  5:29 AM  Age: 59 years   Recent Labs   Lab 04/21/20  2357 04/22/20  0639 04/23/20  0529   AMMONIA 205*  --  78*   AST 57* 54* 50*   ALT 35 35 33   ALKPHOS 159* 145* 121   BILITOT 1.5* 2.0* 2.3*   INR " 1.4* 1.4* 1.4*   APTT 28.9  --   --    PLT 78* 71* 86*   ALBUMIN 2.6* 2.6* 2.4*   LABPROT 14.2* 14.4* 14.3*       Intake/Output Summary (Last 24 hours) at 4/23/2020 0935  Last data filed at 4/23/2020 0537  Gross per 24 hour   Intake 360 ml   Output 1100 ml   Net -740 ml     Diuretics (From admission, onward)    Start     Stop Route Frequency Ordered    04/22/20 0900  furosemide tablet 40 mg      -- Oral 2 times daily 04/22/20 0459    04/22/20 0900  spironolactone tablet 100 mg      -- Oral 2 times daily 04/22/20 0459              Final Active Diagnoses:    Diagnosis Date Noted POA    Hypocalcemia [E83.51] 04/23/2020 Yes    Hypokalemia [E87.6] 04/23/2020 No    Opioid abuse [F11.10] 04/23/2020 Yes     Chronic    Chronic bilateral low back pain without sciatica [M54.5, G89.29] 04/23/2020 Yes     Chronic    Methamphetamine use [F15.10] 04/22/2020 Yes    Cirrhosis of liver with ascites related to hepatitis C virus [K74.60, R18.8] 02/17/2020 Yes     Chronic    Pancytopenia [D61.818] 02/05/2020 Yes     Chronic      Problems Resolved During this Admission:    Diagnosis Date Noted Date Resolved POA    PRINCIPAL PROBLEM:  Hepatic encephalopathy [K72.90] 04/22/2020 04/23/2020 Yes       Discharged Condition: good    Disposition: Home or Self Care    Follow Up:  Follow-up Information     Alma Delia Green MD In 4 days.    Specialties:  Gastroenterology, Hepatology  Why:  For re-evaluation of liver.  Contact information:  [address]  [place] [address] [phone]             [facility] HEMATOLOGY/ONCOLOGY. Schedule an appointment as soon as possible for a visit in 1 month.    Specialty:  Hematology and Oncology  Why:  For re-evaluation of low platelets and low blood counts.  Contact information:  [address] [facility] [address] [phone]           Primary Care Provider of Your Choice. Schedule an appointment as soon as possible for a visit in 4 days.    Why:  For re-evaluation of chronic  medical problems.           Call Outpatient Drug Rehab Program of Your Choice.    Why:  For treatment of drug dependency  Contact information:  Refer to information provided in Patient Instructions on After Visit Summary.               Patient Instructions:      Ambulatory referral/consult to Hematology / Oncology   Standing Status: Future   Referral Priority: Routine Referral Type: Consultation   Referral Reason: Specialty Services Required   Requested Specialty: Hematology and Oncology   Number of Visits Requested: 1     Ambulatory referral/consult to Internal Medicine   Standing Status: Future   Referral Priority: Routine Referral Type: Consultation   Referral Reason: Specialty Services Required   Requested Specialty: Internal Medicine   Number of Visits Requested: 1     Diet Cardiac   Order Comments: Low Sodium - MAX 2 grams sodium per day.     Notify your health care provider if you experience any of the following:  temperature >100.4     Notify your health care provider if you experience any of the following:  persistent nausea and vomiting or diarrhea     Notify your health care provider if you experience any of the following:  difficulty breathing or increased cough     Notify your health care provider if you experience any of the following:  increased confusion or weakness     Activity as tolerated     Activity as tolerated       Significant Diagnostic Studies:  Results for orders placed or performed during the hospital encounter of 04/21/20   Blood culture x two cultures. Draw prior to antibiotics.   Result Value Ref Range    Blood Culture, Routine No Growth to date    Blood culture x two cultures. Draw prior to antibiotics.   Result Value Ref Range    Blood Culture, Routine No Growth to date    CBC auto differential   Result Value Ref Range    WBC 2.71 (L) 3.90 - 12.70 K/uL    RBC 3.44 (L) 4.00 - 5.40 M/uL    Hemoglobin 10.9 (L) 12.0 - 16.0 g/dL    Hematocrit 32.9 (L) 37.0 - 48.5 %    Mean Corpuscular  Volume 96 82 - 98 fL    Mean Corpuscular Hemoglobin 31.7 (H) 27.0 - 31.0 pg    Mean Corpuscular Hemoglobin Conc 33.1 32.0 - 36.0 g/dL    RDW 15.5 (H) 11.5 - 14.5 %    Platelets 78 (L) 150 - 350 K/uL    MPV 11.1 9.2 - 12.9 fL    Immature Granulocytes 0.7 (H) 0.0 - 0.5 %    Gran # (ANC) 1.5 (L) 1.8 - 7.7 K/uL    Immature Grans (Abs) 0.02 0.00 - 0.04 K/uL    Lymph # 0.5 (L) 1.0 - 4.8 K/uL    Mono # 0.5 0.3 - 1.0 K/uL    Eos # 0.1 0.0 - 0.5 K/uL    Baso # 0.03 0.00 - 0.20 K/uL    nRBC 0 0 /100 WBC    Gran% 56.1 38.0 - 73.0 %    Lymph% 18.1 18.0 - 48.0 %    Mono% 19.9 (H) 4.0 - 15.0 %    Eosinophil% 4.1 0.0 - 8.0 %    Basophil% 1.1 0.0 - 1.9 %    Differential Method Automated    Comprehensive metabolic panel   Result Value Ref Range    Sodium 139 136 - 145 mmol/L    Potassium 3.8 3.5 - 5.1 mmol/L    Chloride 106 95 - 110 mmol/L    CO2 21 (L) 23 - 29 mmol/L    Glucose 116 (H) 70 - 110 mg/dL    BUN, Bld 12 8 - 23 mg/dL    Creatinine 0.9 0.5 - 1.4 mg/dL    Calcium 8.0 (L) 8.7 - 10.5 mg/dL    Total Protein 7.1 6.0 - 8.4 g/dL    Albumin 2.6 (L) 3.5 - 5.2 g/dL    Total Bilirubin 1.5 (H) 0.1 - 1.0 mg/dL    Alkaline Phosphatase 159 (H) 55 - 135 U/L    AST 57 (H) 10 - 40 U/L    ALT 35 10 - 44 U/L    Anion Gap 12 8 - 16 mmol/L    eGFR if African American >60 >60 mL/min/1.73 m^2    eGFR if non African American >60 >60 mL/min/1.73 m^2   C-Reactive Protein   Result Value Ref Range    CRP 11.1 (H) 0.0 - 8.2 mg/L   Ferritin   Result Value Ref Range    Ferritin 138 20.0 - 300.0 ng/mL   Lactate Dehydrogenase   Result Value Ref Range     (H) 110 - 260 U/L   CK   Result Value Ref Range    CPK 70 20 - 180 U/L   Lactic Acid, Plasma   Result Value Ref Range    Lactate (Lactic Acid) 2.6 (H) 0.5 - 2.2 mmol/L   Troponin I   Result Value Ref Range    Troponin I 0.013 0.000 - 0.026 ng/mL   COVID-19 Rapid Screening   Result Value Ref Range    SARS-CoV-2 RNA, Amplification, Qual Negative Negative   Procalcitonin   Result Value Ref Range     Procalcitonin 0.08 <0.25 ng/mL   Magnesium   Result Value Ref Range    Magnesium 1.9 1.6 - 2.6 mg/dL   Acetaminophen level   Result Value Ref Range    Acetaminophen (Tylenol), Serum <3.0 (L) 10.0 - 20.0 ug/mL   Drug screen panel, emergency   Result Value Ref Range    Benzodiazepines Negative     Methadone metabolites Negative     Cocaine (Metab.) Negative     Opiate Scrn, Ur Negative     Barbiturate Screen, Ur Negative     Amphetamine Screen, Ur Presumptive Positive     THC Negative     Phencyclidine Negative     Creatinine, Random Ur 108.8 15.0 - 325.0 mg/dL    Toxicology Information SEE COMMENT    TSH   Result Value Ref Range    TSH 0.710 0.400 - 4.000 uIU/mL   Urinalysis, Reflex to Urine Culture Urine, Clean Catch   Result Value Ref Range    Specimen UA Urine, Catheterized     Color, UA Yellow Yellow, Straw, Laure    Appearance, UA Clear Clear    pH, UA 7.0 5.0 - 8.0    Specific Gravity, UA 1.015 1.005 - 1.030    Protein, UA Negative Negative    Glucose, UA Negative Negative    Ketones, UA Negative Negative    Bilirubin (UA) Negative Negative    Occult Blood UA Negative Negative    Nitrite, UA Negative Negative    Urobilinogen, UA 2.0-3.0 (A) <2.0 EU/dL    Leukocytes, UA Negative Negative   Ethanol   Result Value Ref Range    Alcohol, Medical, Serum <10 <10 mg/dL   APTT   Result Value Ref Range    aPTT 28.9 21.0 - 32.0 sec   Protime-INR   Result Value Ref Range    Prothrombin Time 14.2 (H) 9.0 - 12.5 sec    INR 1.4 (H) 0.8 - 1.2   Ammonia   Result Value Ref Range    Ammonia 205 (H) 10 - 50 umol/L   Comprehensive metabolic panel   Result Value Ref Range    Sodium 138 136 - 145 mmol/L    Potassium 3.9 3.5 - 5.1 mmol/L    Chloride 107 95 - 110 mmol/L    CO2 24 23 - 29 mmol/L    Glucose 108 70 - 110 mg/dL    BUN, Bld 11 8 - 23 mg/dL    Creatinine 0.8 0.5 - 1.4 mg/dL    Calcium 8.1 (L) 8.7 - 10.5 mg/dL    Total Protein 7.1 6.0 - 8.4 g/dL    Albumin 2.6 (L) 3.5 - 5.2 g/dL    Total Bilirubin 2.0 (H) 0.1 - 1.0 mg/dL     Alkaline Phosphatase 145 (H) 55 - 135 U/L    AST 54 (H) 10 - 40 U/L    ALT 35 10 - 44 U/L    Anion Gap 7 (L) 8 - 16 mmol/L    eGFR if African American >60 >60 mL/min/1.73 m^2    eGFR if non African American >60 >60 mL/min/1.73 m^2   CBC auto differential   Result Value Ref Range    WBC 2.36 (L) 3.90 - 12.70 K/uL    RBC 3.42 (L) 4.00 - 5.40 M/uL    Hemoglobin 10.9 (L) 12.0 - 16.0 g/dL    Hematocrit 33.2 (L) 37.0 - 48.5 %    Mean Corpuscular Volume 97 82 - 98 fL    Mean Corpuscular Hemoglobin 31.9 (H) 27.0 - 31.0 pg    Mean Corpuscular Hemoglobin Conc 32.8 32.0 - 36.0 g/dL    RDW 15.7 (H) 11.5 - 14.5 %    Platelets 71 (L) 150 - 350 K/uL    MPV 11.3 9.2 - 12.9 fL    Immature Granulocytes 0.8 (H) 0.0 - 0.5 %    Gran # (ANC) 1.4 (L) 1.8 - 7.7 K/uL    Immature Grans (Abs) 0.02 0.00 - 0.04 K/uL    Lymph # 0.4 (L) 1.0 - 4.8 K/uL    Mono # 0.5 0.3 - 1.0 K/uL    Eos # 0.0 0.0 - 0.5 K/uL    Baso # 0.02 0.00 - 0.20 K/uL    nRBC 0 0 /100 WBC    Gran% 59.0 38.0 - 73.0 %    Lymph% 17.8 (L) 18.0 - 48.0 %    Mono% 19.9 (H) 4.0 - 15.0 %    Eosinophil% 1.7 0.0 - 8.0 %    Basophil% 0.8 0.0 - 1.9 %    Differential Method Automated    Protime-INR   Result Value Ref Range    Prothrombin Time 14.4 (H) 9.0 - 12.5 sec    INR 1.4 (H) 0.8 - 1.2   Magnesium   Result Value Ref Range    Magnesium 1.9 1.6 - 2.6 mg/dL   Phosphorus   Result Value Ref Range    Phosphorus 3.1 2.7 - 4.5 mg/dL   Comprehensive metabolic panel   Result Value Ref Range    Sodium 140 136 - 145 mmol/L    Potassium 3.3 (L) 3.5 - 5.1 mmol/L    Chloride 108 95 - 110 mmol/L    CO2 23 23 - 29 mmol/L    Glucose 105 70 - 110 mg/dL    BUN, Bld 14 8 - 23 mg/dL    Creatinine 0.9 0.5 - 1.4 mg/dL    Calcium 7.6 (L) 8.7 - 10.5 mg/dL    Total Protein 6.7 6.0 - 8.4 g/dL    Albumin 2.4 (L) 3.5 - 5.2 g/dL    Total Bilirubin 2.3 (H) 0.1 - 1.0 mg/dL    Alkaline Phosphatase 121 55 - 135 U/L    AST 50 (H) 10 - 40 U/L    ALT 33 10 - 44 U/L    Anion Gap 9 8 - 16 mmol/L    eGFR if African American  >60 >60 mL/min/1.73 m^2    eGFR if non African American >60 >60 mL/min/1.73 m^2   CBC auto differential   Result Value Ref Range    WBC 3.26 (L) 3.90 - 12.70 K/uL    RBC 3.38 (L) 4.00 - 5.40 M/uL    Hemoglobin 10.8 (L) 12.0 - 16.0 g/dL    Hematocrit 33.1 (L) 37.0 - 48.5 %    Mean Corpuscular Volume 98 82 - 98 fL    Mean Corpuscular Hemoglobin 32.0 (H) 27.0 - 31.0 pg    Mean Corpuscular Hemoglobin Conc 32.6 32.0 - 36.0 g/dL    RDW 15.8 (H) 11.5 - 14.5 %    Platelets 86 (L) 150 - 350 K/uL    MPV 11.4 9.2 - 12.9 fL    Immature Granulocytes 0.9 (H) 0.0 - 0.5 %    Gran # (ANC) 1.5 (L) 1.8 - 7.7 K/uL    Immature Grans (Abs) 0.03 0.00 - 0.04 K/uL    Lymph # 0.9 (L) 1.0 - 4.8 K/uL    Mono # 0.7 0.3 - 1.0 K/uL    Eos # 0.1 0.0 - 0.5 K/uL    Baso # 0.05 0.00 - 0.20 K/uL    nRBC 0 0 /100 WBC    Gran% 45.4 38.0 - 73.0 %    Lymph% 26.7 18.0 - 48.0 %    Mono% 21.2 (H) 4.0 - 15.0 %    Eosinophil% 4.3 0.0 - 8.0 %    Basophil% 1.5 0.0 - 1.9 %    Differential Method Automated    Protime-INR   Result Value Ref Range    Prothrombin Time 14.3 (H) 9.0 - 12.5 sec    INR 1.4 (H) 0.8 - 1.2   Ammonia   Result Value Ref Range    Ammonia 78 (H) 10 - 50 umol/L   US Guided Paracentesis  Narrative: EXAMINATION:  US GUIDED PARACENTESIS INC IMAGING    CLINICAL HISTORY:  ascites;    TECHNIQUE:  : Donal Fletcher    Written and verbal informed consent was obtained.  Targeted ultrasound was performed and the skin was marked in the right lower quadrant superficial to a safe pocket of abdominal ascites.  The area was prepped and draped in the usual sterile fashion.  A time-out was performed.  Lidocaine was instilled for local anesthesia.  5 Filipino catheter was inserted into the right lower quadrant with the assistance of Pawel Uriarte.   Approximately 2 L of serous ascites was withdrawn.  A sample was sent for additional analysis.  The catheter was removed.  Local hemostasis was achieved.  A sterile dressing was applied. The patient  tolerated the procedure well.    FINDINGS:  Ultrasound images demonstrate a moderate volume of abdominal ascites and safe pocket for paracentesis in the right lower quadrant.  Impression: Right lower quadrant paracentesis with removal of 2 L of ascitic fluid without difficulty.    Electronically signed by: Erick Hou MD  Date:    04/22/2020  Time:    14:58  X-Ray Abdomen AP 1 View (KUB)  Narrative: EXAMINATION:  XR ABDOMEN AP 1 VIEW    CLINICAL HISTORY:  Abdominal distension (gaseous)    TECHNIQUE:  Single view of the abdomen was performed.    COMPARISON:  CT 04/22/2020.    FINDINGS:  Nondiagnostic study with significant motion limitation.  Impression: Nondiagnostic study.  Correlate with CT scan same date of service.    Electronically signed by: Erick Hou MD  Date:    04/22/2020  Time:    07:29  X-Ray Chest 1 View  Narrative: EXAMINATION:  XR CHEST 1 VIEW    CLINICAL HISTORY:  NG tube;    TECHNIQUE:  Single frontal view of the chest was performed.    COMPARISON:  04/22/2020    FINDINGS:  Nasogastric tube appears satisfactory.  In comparison to the prior study, there is no adverse interval changes  Impression: In comparison to the prior study, there is no adverse interval changes    Electronically signed by: Erick Hou MD  Date:    04/22/2020  Time:    07:21  X-Ray Chest AP Portable  Narrative: EXAMINATION:  XR CHEST AP PORTABLE    CLINICAL HISTORY:  Suspected Covid-19 Virus Infection;    FINDINGS:  Single view of the chest.  Aorta demonstrates atherosclerotic disease.    Cardiac silhouette is normal.  No consolidation.  Subtle ground-glass opacity within the right midlung zone could reflect early airspace disease.  Atelectasis seen at the left lung base.  No evidence of pleural effusion or pneumothorax.  Bones demonstrate degenerative changes.  Impression: Subtle ground-glass opacity within the right midlung zone could reflect early airspace disease.  Continued follow-up is recommended.    Electronically  signed by: Erick Hou MD  Date:    04/22/2020  Time:    07:20  CT Head Without Contrast  Narrative: EXAMINATION:  CT HEAD WITHOUT CONTRAST    CLINICAL HISTORY:  Confusion/delirium, altered LOC, unexplained;    TECHNIQUE:  Low dose axial CT images obtained throughout the head without intravenous contrast. Sagittal and coronal reconstructions were performed.    COMPARISON:  None.    FINDINGS:  Intracranial compartment:    The brain parenchyma demonstrates areas of decreased attenuation with mild to moderate periventricular white matter consistent with chronic microvascular ischemic changes..  No parenchymal mass, hemorrhage, edema or major vascular distribution infarct.  Vascular calcifications are noted.    Mild prominence of the sulci and ventricles are consistent with age-related involutional changes.    No extra-axial blood or fluid collections.    Skull/extracranial contents (limited evaluation): No fracture. Mastoid air cells and paranasal sinuses are essentially clear.  Impression: Chronic microvascular ischemic changes.    All CT scans at this facility use dose modulation, iterative reconstruction, and/or weight based dosing when appropriate to reduce radiation dose to as low as reasonable achievable.    Electronically signed by: Erick Hou MD  Date:    04/22/2020  Time:    07:09  CT Abdomen Pelvis With Contrast  Narrative: EXAMINATION:  CT ABDOMEN PELVIS WITH CONTRAST    CLINICAL HISTORY:  Abdominal distension;    TECHNIQUE:  Low dose axial images, sagittal and coronal reformations were obtained from the lung bases to the pubic symphysis following the IV administration of 75 mL of Omnipaque 350.  Oral contrast was not administered.    COMPARISON:  04/22/2020    FINDINGS:  Heart: Normal size. No effusion.    Lung Bases: Clear.    Liver: Nodular configuration liver consistent with cirrhosis.  No liver mass is seen.  Portal vein is patent.    Gallbladder: Multiple gallstones with mild gallbladder  distension.  Mild wall thickening.    Bile Ducts: No dilatation.    Pancreas: Atrophic.  No mass. No peripancreatic fat stranding.    Spleen: Moderate splenomegaly measuring 18 cm.    Adrenals: Normal.    Kidneys/Ureters: Normal enhancement.  No mass or  hydroureteronephrosis.    Bladder: Collapsed    Reproductive organs: Uterus is not seen.    GI Tract/Mesentery: Moderate hiatal hernia.  Gastric rugal fold thickening and hyperemia noted consistent with gastritis.  Small duodenal diverticulum noted along the 2nd portion the duodenum.  No evidence of small-bowel obstruction however there is mild mucosal thickening throughout distal small bowel loops.  Significant thickening is seen at the level of the cecum, ascending and transverse colon.  Moderate constipation seen throughout the distal transverse and descending colon.  No evidence of appendicitis.    Peritoneal Space: Large amount of ascites seen throughout the abdomen and pelvis.    Retroperitoneum: Shotty retroperitoneal adenopathy.    Abdominal wall: Diffuse anasarca.    Vasculature: No aneurysm. Aorta demonstrates moderate atherosclerotic disease.    Bones: Postoperative changes are seen within the right hemipelvis.  Diffuse osteopenia noted.  Compression deformities throughout the thoracic and lumbar spine.  MRI can be obtained as clinically warranted.  Moderate degenerative changes lower lumbar spine.  Remote right-sided rib fracture noted.  Impression: Severe thickening of the cecum, ascending and transverse colon concerning for infectious or inflammatory colitis.  Findings can also be seen in the setting of portal colopathy.    Portal gastropathy or gastritis also suspected.    Multiple gallstones with mild gallbladder distension. Mild wall thickening.  Right upper quadrant ultrasound or HIDA scan can be obtained as clinically warranted.    Large volume of ascites throughout the abdomen.  Findings of cirrhosis and portal hypertension noted.    All CT scans  at this facility use dose modulation, iterative reconstruction, and/or weight based dosing when appropriate to reduce radiation dose to as low as reasonable achievable.    Electronically signed by: Erick Hou MD  Date:    04/22/2020  Time:    07:03       Pending Diagnostic Studies:     None         Medications:  Reconciled Home Medications:      Medication List      CHANGE how you take these medications    ciprofloxacin HCl 500 MG tablet  Commonly known as:  CIPRO  Take 1 tablet (500 mg) by mouth 2 times a week (Sunday and Wednesday)  What changed:    · how much to take  · how to take this  · when to take this  · additional instructions     lactulose 10 gram/15 ml 10 gram/15 mL (15 mL) solution  Commonly known as:  CHRONULAC  Take 15 mLs (10 g total) by mouth 3 (three) times daily.  What changed:  how much to take     metoprolol tartrate 25 MG tablet  Commonly known as:  LOPRESSOR  Take 1 tablet (25 mg total) by mouth 2 (two) times daily.  What changed:  See the new instructions.        CONTINUE taking these medications    albuterol 90 mcg/actuation inhaler  Commonly known as:  PROVENTIL/VENTOLIN HFA  Inhale 2 puffs into the lungs every 6 (six) hours.     amitriptyline 25 MG tablet  Commonly known as:  ELAVIL  Take 25 mg by mouth every evening.     furosemide 40 MG tablet  Commonly known as:  LASIX  Take 1 tablet (40 mg total) by mouth 2 (two) times daily.     ondansetron 4 MG Tbdl  Commonly known as:  ZOFRAN-ODT  Take 1 tablet (4 mg total) by mouth every 8 (eight) hours as needed (nausea).     pantoprazole 40 MG tablet  Commonly known as:  PROTONIX  Take 40 mg by mouth 2 (two) times daily.     spironolactone 100 MG tablet  Commonly known as:  ALDACTONE  Take 1 tablet (100 mg total) by mouth 2 (two) times daily.        STOP taking these medications    ondansetron 4 MG tablet  Commonly known as:  ZOFRAN            Indwelling Lines/Drains at time of discharge:   Lines/Drains/Airways     None                 Time  spent on the discharge of patient: 50 minutes  Patient was seen and examined on the date of discharge and determined to be suitable for discharge.         RYAN Fay MD  Department of Hospital Medicine  [facility] -

## 2020-04-23 NOTE — PLAN OF CARE
CM spoke with patient via telephone to assess for discharge needs. Pt states that she lives at home with her mother, and is dependent with some needs. She utilizes a walker and bedside commode for assistance at home. She states that she plans on going to stay with her daughter for a bit when she is discharged. She denies the need for any other resources at this time. CM informed patient that any other needs will be dependent on hospital progress.     D/C Plan: Home  PCP: None  [facility]erred [facility]  Discharge transportation: Family  [facility]: [facility]  Pharmacy Bedside Delivery: Yes       04/23/20 1000   Discharge Assessment   Assessment Type Discharge Planning Assessment   Confirmed/corrected address and phone number on facesheet? Yes   Assessment information obtained from? Patient   Expected Length of Stay (days)   (TBD)   Communicated expected length of stay with patient/caregiver yes   Prior to hospitilization cognitive status: Alert/Oriented   Prior to hospitalization functional status: Independent;Assistive Equipment   Current cognitive status: Alert/Oriented   Current Functional Status: Independent;Assistive Equipment   Facility Arrived From: Home   Lives With parent(s)   Able to Return to Prior Arrangements yes   Is patient able to care for self after discharge? Yes   Who are your caregiver(s) and their phone number(s)? Flori Correa, Daughter- [phone]; Derek Gardiner, Friend- [phone]   Patient's perception of discharge disposition home or selfcare   Readmission Within the Last 30 Days no previous admission in last 30 days   Patient currently being followed by outpatient case management? No   Patient currently receives any other outside agency services? No   Equipment Currently Used at Home walker, rolling;bedside commode   Do you have any problems affording any of your prescribed medications? No   Is the patient taking medications as prescribed? yes   Does the patient have  transportation home? Yes   Transportation Anticipated family or friend will provide   Dialysis Name and Scheduled days NA   Does the patient receive services at the Coumadin Clinic? No   Discharge Plan A Home with family   DME Needed Upon Discharge  none   Patient/Family in Agreement with Plan yes

## 2020-04-23 NOTE — PROGRESS NOTES
"[facility] -   Adult Nutrition  Progress Note    SUMMARY       Recommendations    Recommendation: 1. Continue current diet 2. RD to f/u  Goals: Meet >85% EEN/EPN by RD f/u  Nutrition Goal Status: new  Communication of RD Recs: (POC)    Reason for Assessment    Reason For Assessment: identified at risk by screening criteria  Diagnosis: (hepatic encephalopathy)  Relevant Medical History: .  General Information Comments: Pt came to hospital 4/21 with fatigue and elevated ammonia. NG tube inserted for meds but already removed and pt is on a cardiac diet, with good intake so far. 2 minor wounds and ascites, edema noted. Due to recent hospital wide restrictions to limit the transfer of (COVID-19), we are not performing any physical exams at this time. All S/S will be observational; NFPE to be performed at a future date.  Nutrition Discharge Planning: pending medical course    Nutrition Risk Screen    Nutrition Risk Screen: other (see comments)(confused; distended ABD )    Nutrition/Diet History      LANCE  Anthropometrics    Temp: 97.6 °F (36.4 °C)  Height: 5' 2" (157.5 cm)  Height (inches): 62 in  Weight Method: Standard Scale  Weight: 63 kg (138 lb 14.2 oz)  Weight (lb): 138.89 lb  Ideal Body Weight (IBW), Female: 110 lb  BMI (Calculated): 25.4  BMI Grade: 25 - 29.9 - overweight       Lab/Procedures/Meds    Pertinent Labs Reviewed: reviewed  BMP  Lab Results   Component Value Date     04/23/2020    K 3.3 (L) 04/23/2020     04/23/2020    CO2 23 04/23/2020    BUN 14 04/23/2020    CREATININE 0.9 04/23/2020    CALCIUM 7.6 (L) 04/23/2020    ANIONGAP 9 04/23/2020    ESTGFRAFRICA >60 04/23/2020    EGFRNONAA >60 04/23/2020     Lab Results   Component Value Date    CALCIUM 7.6 (L) 04/23/2020    PHOS 3.1 04/22/2020     Lab Results   Component Value Date    ALBUMIN 2.4 (L) 04/23/2020        Ammonia of 78 today.    Pertinent Medications Reviewed: reviewed    Physical Findings/Assessment     wounds, edema, " ascites    Estimated/Assessed Needs    Weight Used For Calorie Calculations: 63 kg (138 lb 14.2 oz)  Energy Calorie Requirements (kcal): 1337  Energy Need Method: [place]-St Jeor(x 1.2)  Protein Requirements: 63- 94g  Weight Used For Protein Calculations: 63 kg (138 lb 14.2 oz)     Estimated Fluid Requirement Method: RDA Method(or per MD)  RDA Method (mL): 1337         Nutrition Prescription Ordered    Current Diet Order: cardiac low sodium    Evaluation of Received Nutrient/Fluid Intake          Intake/Output Summary (Last 24 hours) at 4/23/2020 0847  Last data filed at 4/23/2020 0537  Gross per 24 hour   Intake 360 ml   Output 1100 ml   Net -740 ml     % Intake of Estimated Energy Needs: 75 - 100 %  % Meal Intake: 75 - 100 %    Nutrition Risk    1x week    Assessment and Plan    Nutrition Problem  Altered nutrition related lab values    Related to (etiology):   Hepatic encephalopathy    Signs and Symptoms (as evidenced by):   Ammonia of 205 at admission, ascites    Interventions/Recommendations (treatment strategy):  Collaboration with other providers    Nutrition Diagnosis Status:   New      Monitor and Evaluation    Food and Nutrient Intake: food and beverage intake  Food and Nutrient Adminstration: diet order  Anthropometric Measurements: weight  Biochemical Data, Medical Tests and Procedures: electrolyte and renal panel, glucose/endocrine profile  Nutrition-Focused Physical Findings: skin     Malnutrition Assessment           deferred  Nutrition Follow-Up    RD Follow-up?: Yes

## 2020-04-23 NOTE — ASSESSMENT & PLAN NOTE
04/23/2020  ASSESSMENT: Resolved. She is lucid and conversant this morning. She acknowledges compliance and drug use as complicating factor.   PLAN:  Education provided. Outpatient follow-up.

## 2020-04-23 NOTE — DISCHARGE INSTRUCTIONS
AREA CHEMICAL DEPENDENCY TREATMENT OPTIONS    [address], [address]  [place] [address]  [email or web address]  PHONE: [phone]    [facility] offers the following services:    DETOX: These services are available as medical or social addictive disorders treatment. Medical services provide immediate, acute care to substance abusers at an extreme health risk, either because of a co-occurring medical condition, or because of a severe medical condition resulting from the substance abuse. Social detox services treat patients who need immediate care to treat addiction, but do not have any urgent health problems.   INPATIENT CARE: This service provides non-emergency treatment and includes a planned, professionally implemented regime for people suffering from addiction problems. Inpatient facilities operate 24 hours a day, seven days a week, and provide medical and psychiatric care as warranted.   OUTPATIENT CARE: Outpatient services are either non-intensive or intensive, based on the level of services provided in that program. In non-intensive programs, treatment/recovery/aftercare or rehabilitation services are provided, but the client does not reside in a treatment facility while receiving these services. The client receives treatment services with or without medication, including counseling and supportive services. In intensive programs, services provided to a client last three or more hours per day for three or more days per week, with a minimum of nine treatment hours per week provided.   COMMUNITY-BASED SERVICES: Community Based Services include both residential and non-residential treatment options for both children and adults who are experiencing mental health and/or addictive disorders. These services allow people to remain in the community environment where they can contribute meaningfully and productively.   COMMUNITY BASED RESIDENTIAL SERVICES:  Community based residential programs serve individuals who need safe and stable living environments in order to develop their recovery skills. Services are provided by addiction and mental health treatment personnel who provide a planned regimen of care in a 24 hour living setting.   [facility] COMMUNITY BASED NON RESIDENTIAL SERVICES: Community based non-residential treatment services are delivered in a wide variety of settings. Treatment staff, including physicians, provide professionally directed evaluation, treatment and recovery services. Services are tailored to each individual's level of clinical severity and are designed to help the individuals achieve behavioral changes while in their home environments.   [facility] ADDICTIVE DISORDERS RESIDENTIAL REHABILITATION FACILITIES: [facility] residential programs are for individuals who are not able to reach or maintain recovery goals on an outpatient basis and need structure and support in their living situation to be able to get better.   SERVICES FOR WOMEN WITH DEPENDENT CHILDREN: [facility] has a contract with the Department of Children and Family Services (DCFS) to provide addiction treatment services for individuals in the Child Welfare (CW) and Economic Stability Self Sufficiency (ESSS) sections. The overall goals of the programs are to provide assistance to needy families where children may be cared for in their own homes, or in the homes of relatives; and to end the dependence of needy parents on government benefits by promoting job preparation, work and marriage.   OPIATE TREATMENT PROGRAMS: These services treat addiction to and physical dependence on opiates (either alone or combined with abuse of other substances) through detoxification and stabilization on methadone.    [facility]  [facility] [facility] [place] [address]  PHONE: [phone]    Hours of Operation: 7/24 (7 days/week, 24 hours/day)  Criteria for Admission: Need for drug or alcohol  detoxification  Payment Method: Sliding Fee Scale  Program Synopsis: Services are provided in a supported environment with aftercare planning completed during the detoxification process as well as referral to long-term treatment. Twenty-four (24) beds are available for admissions.    Services Provided During Detoxification:   Monitoring of all client activities   Review of vital signs on a scheduled basis   Referral to hospital or other medical care if required   Emergency medical transportation if needed  Medications for detoxification as prescribed by referral hospital   Nutritional care program   General health/hygiene program   Educational program on the disease concept, relapse, STD's   Educational program for the families of clients on the disease concept   Introduction to self-help groups such as AA, NA with both open and closed groups   Introduction to Big Book studies   Work on relapse prevention   Introduction to treatment programs for referral   Experimental groups   Individual and group counseling   Referral to appropriate long-term treatment as available      More  Resources    Narcotics Anonymous  · National www.NA.org  · State www.LARNA.org  · Local www.LARNA.org/john (HOTLINE: 488.333.4797)     [facility] ([facility]) - The Substance Abuse and Mental Health Services Administration (SAMHSA) is the agency within the U. S. Department of Health and Human Services that leads public health efforts to advance the behavioral health of the nation. [facility]'s mission is to reduce the impact of substance abuse (including prescription drug abuse) and mental illness on [place] communities.If you or a family member is suffering from drug dependence or other mental health problems, get help!  · CALL (336) 329-HELP (8889) or VISIT dpt2.[facility].gov/treatment.  [facility] National Helpline (also known as the Treatment Referral Routing Service) is a confidential, free, 24-hour-a-day, 365-day-a-year, information service,  in English and Korean, for individuals and family members facing mental health and/or substance use disorders. This service provides referrals to local treatment facilities, support groups, and community-based organizations. Callers can also order free publications and other information.  · The Helpline receives over 20,000 calls each month. The referral service is free of charge. If you have no insurance or are underinsured, they will refer you to your state office, which is responsible for state-funded treatment programs. In addition, they can often refer you to facilities that charge on a sliding fee scale or accept Medicare or Medicaid. If you have health insurance, you are encouraged to contact your insurer for a list of participating health care providers and facilities.The service is confidential. They will not ask you for any personal information. They may ask for your zip code or other pertinent geographic information in order to track calls being routed to other offices or to accurately identify the local resources appropriate to your needs.    Methadone Clinics - Methadone is a synthetic opioid, used to treat pain and to treat people who are dependent on pain medications. Methadone is typically offered only at licensed Methadone Clinics licensed by the [place] Department of Health and Hospitals ([facility]). For a listing of methadone clinics in [place], [facility] at [phone].    Suboxone® - Suboxone® (Buprenorphine) is a semi-synthetic opioid that is used to treat opioid addiction and to treat chronic pain. Doctors can only prescribe Suboxone® if they have received a special certification from the Federal Substance Abuse and Mental Health Services Administration. For help finding Suboxone® (Buprenorphine) physicians & treatment programs near you, visit www.Suboxone.com or call (977) 357-9484.

## 2020-04-23 NOTE — TELEPHONE ENCOUNTER
Called patient and offered to scheduled patient an appt. Patient stated she is not able to make an appt rite now and she will call back when she get time and schedule

## 2020-04-23 NOTE — ASSESSMENT & PLAN NOTE
04/23/2020  ASSESSMENT: Chronic. Stable. Believed to be related to chronic liver disease.   PLAN:  Outpatient hematology consult. (She has seen hematology in the past.)    Recent Labs   Lab 04/21/20 2357 04/22/20  0639 04/23/20  0529   WBC 2.71* 2.36* 3.26*     Recent Labs   Lab 04/21/20 2357 04/22/20  0639 04/23/20  0529   HGB 10.9* 10.9* 10.8*   HCT 32.9* 33.2* 33.1*     Recent Labs   Lab 04/21/20 2357 04/22/20  0639 04/23/20  0529   PLT 78* 71* 86*     Recent Labs   Lab 04/21/20 2357 04/22/20  0639 04/23/20  0529   LABPROT 14.2* 14.4* 14.3*   INR 1.4* 1.4* 1.4*   APTT 28.9  --   --      Lab Results   Component Value Date    FERRITIN 138 04/21/2020

## 2020-04-23 NOTE — ASSESSMENT & PLAN NOTE
04/23/2020  ASSESSMENT: Corrected calcium = 8.9 (WNL).   PLAN:  Stable for outpatient follow-up.    Recent Labs   Lab 04/16/20  1338 04/21/20  2357 04/22/20  0639 04/23/20  0529    139 138 140   K 3.5 3.8 3.9 3.3*    106 107 108   CO2 24 21* 24 23      Recent Labs   Lab 04/16/20  1338 04/21/20  2357 04/22/20  0639 04/23/20  0529   CALCIUM 8.3* 8.0* 8.1* 7.6*   MG  --  1.9 1.9  --    PHOS  --   --  3.1  --    ALBUMIN  --  2.6* 2.6* 2.4*

## 2020-04-23 NOTE — NURSING
Pt AAOx4. While ambulating to restroom, inadvertently removed NG tube. Secure chat sent to provider; stated ok to give meds by mouth. Pt tolerated taking oral meds w/out difficulty. Will continue to monitor.

## 2020-04-23 NOTE — ASSESSMENT & PLAN NOTE
"04/23/2020  ASSESSMENT: She says she gets opioid pain meds "from a friend." She is agreeable to outpatient drug rehab program.   PLAN:  Education and information provided. Refer to "Patient Instructions" section of after visit summary.    "

## 2020-04-24 LAB — BACTERIA SPEC ANAEROBE CULT: NORMAL

## 2020-04-26 LAB
BACTERIA BLD CULT: NORMAL
BACTERIA BLD CULT: NORMAL

## 2020-04-27 NOTE — PLAN OF CARE
04/27/20 1357   Final Note   Assessment Type Final Discharge Note   Anticipated Discharge Disposition Home   Right Care Referral Info   Post Acute Recommendation No Care

## 2020-04-28 ENCOUNTER — TELEPHONE (OUTPATIENT)
Dept: GASTROENTEROLOGY | Facility: CLINIC | Age: 60
End: 2020-04-28

## 2020-04-28 ENCOUNTER — TELEPHONE (OUTPATIENT)
Dept: HEMATOLOGY/ONCOLOGY | Facility: CLINIC | Age: 60
End: 2020-04-28

## 2020-04-28 NOTE — TELEPHONE ENCOUNTER
Attempted to contact patient at [phone] with no answer. Left voicemail message for patient to return call.     Re: 2-3 week hospital follow-up per Dr. Jo

## 2020-04-28 NOTE — TELEPHONE ENCOUNTER
----- Message from Shona Irwin sent at 4/28/2020  9:02 AM CDT -----  Contact: pt   Pt needing a call back regarding the audio appt that she missed. Pt was having trouble with the phone     Pt contact# [phone]

## 2020-04-29 ENCOUNTER — PATIENT OUTREACH (OUTPATIENT)
Dept: ADMINISTRATIVE | Facility: CLINIC | Age: 60
End: 2020-04-29

## 2020-04-29 ENCOUNTER — OFFICE VISIT (OUTPATIENT)
Dept: HEMATOLOGY/ONCOLOGY | Facility: CLINIC | Age: 60
DRG: 442 | End: 2020-04-29
Payer: MEDICAID

## 2020-04-29 DIAGNOSIS — D61.818 PANCYTOPENIA: Chronic | ICD-10-CM

## 2020-04-29 DIAGNOSIS — K74.60 CIRRHOSIS OF LIVER WITH ASCITES, UNSPECIFIED HEPATIC CIRRHOSIS TYPE: Chronic | ICD-10-CM

## 2020-04-29 DIAGNOSIS — D64.9 ANEMIA, UNSPECIFIED TYPE: Primary | ICD-10-CM

## 2020-04-29 DIAGNOSIS — R18.8 CIRRHOSIS OF LIVER WITH ASCITES, UNSPECIFIED HEPATIC CIRRHOSIS TYPE: Chronic | ICD-10-CM

## 2020-04-29 PROCEDURE — 99205 PR OFFICE/OUTPT VISIT, NEW, LEVL V, 60-74 MIN: ICD-10-PCS | Mod: 95,,, | Performed by: INTERNAL MEDICINE

## 2020-04-29 PROCEDURE — 99205 OFFICE O/P NEW HI 60 MIN: CPT | Mod: 95,,, | Performed by: INTERNAL MEDICINE

## 2020-04-29 NOTE — LETTER
April 29, 2020      CITLALLI Fay MD  [address]  [place] [address]           [facility]ematology [facility]  [address] [facility] [place] [address]  Phone: [phone]  Fax: [phone]          Patient: Jossy Siegel   MR Number: [id number]   YOB: 1960   Date of Visit: 4/29/2020       Dear Dr. CITLALLI Fay:    Thank you for referring Jossy Siegel to me for evaluation. Attached you will find relevant portions of my assessment and plan of care.    If you have questions, please do not hesitate to call me. I look forward to following Jossy Siegel along with you.    Sincerely,    Hardy Hoskins MD    Enclosure  CC:  No Recipients    If you would like to receive this communication electronically, please contact [email or web address] or [phone] to request more information on [employer] Link access.    For providers and/or their staff who would like to refer a patient to [facility], please contact us through our one-stop-shop provider referral line, [facility], at [phone].    If you feel you have received this communication in error or would no longer like to receive these types of communications, please e-mail [email or web address]

## 2020-04-29 NOTE — PROGRESS NOTES
C3 nurse attempted to contact patient. No answer. The following message was left for the patient to return the call:  Good Morning  I am a nurse calling on behalf of [facility] from the Care Coordination Center.  This is a Transitional Care Call for Jossy Siegel . When you have a moment please contact us at [phone] or [phone] Monday through Friday, between the hours of 8 am to 4 pm. We look forward to speaking with you. On behalf of [facility] have a nice day.    The patient does not have a scheduled HOSFU appointment within 7-14 days post hospital discharge date 04/23/2020. Non [facility] PCP.

## 2020-04-29 NOTE — PROGRESS NOTES
Subjective:   Date of Visit: 4/29/20   ?   ?    REFERRING PROVIDER: CITLALLI Fay MD  [id number] [place] [address]   ?   CHIEF COMPLAINT:  Pancytopenia???????   ?       HPI:  59-year-old female with history of alcoholic liver cirrhosis with recurrent ascites requiring paracentesis, encephalopathy related to liver failure, hepatitis was referred to us due to noted pancytopenia.  Recent blood work showed mild leukopenia, hemoglobin of 10.8 grams/deciliter and platelet count of 71938.    Given the current COVID-19 concerns, patient joins through virtual visit.  She denies abnormal bleeding including epistaxis, hematemesis, hematochezia, melena or hematuria.  She did complain of fatigue, generalized weakness.  She also complains of abdominal pain which she attributed to recurrent ascites.  She denies fever, chills, chest pain, diarrhea or dysuria.    Review of Systems   Constitutional: Positive for fatigue. Negative for activity change, appetite change, chills, fever and unexpected weight change.   HENT: Negative for hearing loss, mouth sores, nosebleeds, sore throat, tinnitus, trouble swallowing and voice change.    Eyes: Negative for visual disturbance.   Respiratory: Negative for cough, chest tightness and shortness of breath.    Cardiovascular: Negative for chest pain, palpitations and leg swelling.   Gastrointestinal: Positive for abdominal pain. Negative for anal bleeding, blood in stool, constipation, diarrhea, nausea and vomiting.   Genitourinary: Negative for dysuria, frequency, hematuria, pelvic pain, vaginal bleeding and vaginal pain.   Musculoskeletal: Negative for arthralgias, back pain, joint swelling and neck pain.   Skin: Negative for color change, pallor, rash and wound.   Allergic/Immunologic: Negative for immunocompromised state.   Neurological: Positive for weakness. Negative for dizziness, tremors, syncope, speech difficulty, light-headedness and headaches.   Hematological:  Negative for adenopathy. Does not bruise/bleed easily.   Psychiatric/Behavioral: Negative for agitation, confusion, decreased concentration, hallucinations and sleep disturbance. The patient is not nervous/anxious.        ?   PAST MEDICAL HISTORY:   Past Medical History:   Diagnosis Date    Alcoholic cirrhosis of liver with ascites     Cirrhosis     Hepatitis     Pancytopenia 2/5/2020    Last Assessment & Plan:  History & Physical Chronic.  Stable.  Follow-up repeat CBC and INR in a.m.  Discharge Summary  Chronic.  Stable. Likely secondary to cirrhosis. Follow-up  Chronic.  Stable. Likely secondary to cirrhosis.    ?     PAST SURGICAL HISTORY:   Past Surgical History:   Procedure Laterality Date    PARACENTESIS        ?   ALLERGIES:   Allergies as of 04/29/2020    (No Known Allergies)      ?   MEDICATIONS:?   No outpatient medications have been marked as taking for the 4/29/20 encounter (Office Visit) with Hardy Hoskins MD.      ?   SOCIAL HISTORY:?   Social History     Tobacco Use    Smoking status: Current Every Day Smoker     Packs/day: 0.25     Years: 45.00     Pack years: 11.25     Types: Cigarettes    Smokeless tobacco: Never Used   Substance Use Topics    Alcohol use: Not Currently        ?   FAMILY HISTORY:   family history is not on file.   ?     Objective:      Physical Exam  Unable to perform  ?   There were no vitals filed for this visit.   ?     ECOG SCORE               ?   Laboratory:  ?   No visits with results within 1 Day(s) from this visit.   Latest known visit with results is:   Admission on 04/21/2020, Discharged on 04/23/2020   Component Date Value Ref Range Status    WBC 04/21/2020 2.71* 3.90 - 12.70 K/uL Final    RBC 04/21/2020 3.44* 4.00 - 5.40 M/uL Final    Hemoglobin 04/21/2020 10.9* 12.0 - 16.0 g/dL Final    Hematocrit 04/21/2020 32.9* 37.0 - 48.5 % Final    Mean Corpuscular Volume 04/21/2020 96  82 - 98 fL Final    Mean Corpuscular Hemoglobin 04/21/2020 31.7* 27.0 -  31.0 pg Final    Mean Corpuscular Hemoglobin Conc 04/21/2020 33.1  32.0 - 36.0 g/dL Final    RDW 04/21/2020 15.5* 11.5 - 14.5 % Final    Platelets 04/21/2020 78* 150 - 350 K/uL Final    MPV 04/21/2020 11.1  9.2 - 12.9 fL Final    Immature Granulocytes 04/21/2020 0.7* 0.0 - 0.5 % Final    Gran # (ANC) 04/21/2020 1.5* 1.8 - 7.7 K/uL Final    Immature Grans (Abs) 04/21/2020 0.02  0.00 - 0.04 K/uL Final    Lymph # 04/21/2020 0.5* 1.0 - 4.8 K/uL Final    Mono # 04/21/2020 0.5  0.3 - 1.0 K/uL Final    Eos # 04/21/2020 0.1  0.0 - 0.5 K/uL Final    Baso # 04/21/2020 0.03  0.00 - 0.20 K/uL Final    nRBC 04/21/2020 0  0 /100 WBC Final    Gran% 04/21/2020 56.1  38.0 - 73.0 % Final    Lymph% 04/21/2020 18.1  18.0 - 48.0 % Final    Mono% 04/21/2020 19.9* 4.0 - 15.0 % Final    Eosinophil% 04/21/2020 4.1  0.0 - 8.0 % Final    Basophil% 04/21/2020 1.1  0.0 - 1.9 % Final    Differential Method 04/21/2020 Automated   Final    Sodium 04/21/2020 139  136 - 145 mmol/L Final    Potassium 04/21/2020 3.8  3.5 - 5.1 mmol/L Final    Chloride 04/21/2020 106  95 - 110 mmol/L Final    CO2 04/21/2020 21* 23 - 29 mmol/L Final    Glucose 04/21/2020 116* 70 - 110 mg/dL Final    BUN, Bld 04/21/2020 12  8 - 23 mg/dL Final    Creatinine 04/21/2020 0.9  0.5 - 1.4 mg/dL Final    Calcium 04/21/2020 8.0* 8.7 - 10.5 mg/dL Final    Total Protein 04/21/2020 7.1  6.0 - 8.4 g/dL Final    Albumin 04/21/2020 2.6* 3.5 - 5.2 g/dL Final    Total Bilirubin 04/21/2020 1.5* 0.1 - 1.0 mg/dL Final    Alkaline Phosphatase 04/21/2020 159* 55 - 135 U/L Final    AST 04/21/2020 57* 10 - 40 U/L Final    ALT 04/21/2020 35  10 - 44 U/L Final    Anion Gap 04/21/2020 12  8 - 16 mmol/L Final    eGFR if African American 04/21/2020 >60  >60 mL/min/1.73 m^2 Final    eGFR if non African American 04/21/2020 >60  >60 mL/min/1.73 m^2 Final    CRP 04/21/2020 11.1* 0.0 - 8.2 mg/L Final    Ferritin 04/21/2020 138  20.0 - 300.0 ng/mL Final    LD  04/21/2020 299* 110 - 260 U/L Final    CPK 04/21/2020 70  20 - 180 U/L Final    Lactate (Lactic Acid) 04/21/2020 2.6* 0.5 - 2.2 mmol/L Final    Troponin I 04/21/2020 0.013  0.000 - 0.026 ng/mL Final    SARS-CoV-2 RNA, Amplification, Qual 04/22/2020 Negative  Negative Final    Procalcitonin 04/21/2020 0.08  <0.25 ng/mL Final    Blood Culture, Routine 04/22/2020 No Growth after 4 days.    Final    Blood Culture, Routine 04/22/2020 No Growth after 4 days.    Final    Magnesium 04/21/2020 1.9  1.6 - 2.6 mg/dL Final    Acetaminophen (Tylenol), Serum 04/21/2020 <3.0* 10.0 - 20.0 ug/mL Final    Benzodiazepines 04/22/2020 Negative   Final    Methadone metabolites 04/22/2020 Negative   Final    Cocaine (Metab.) 04/22/2020 Negative   Final    Opiate Scrn, Ur 04/22/2020 Negative   Final    Barbiturate Screen, Ur 04/22/2020 Negative   Final    Amphetamine Screen, Ur 04/22/2020 Presumptive Positive   Final    THC 04/22/2020 Negative   Final    Phencyclidine 04/22/2020 Negative   Final    Creatinine, Random Ur 04/22/2020 108.8  15.0 - 325.0 mg/dL Final    Toxicology Information 04/22/2020 SEE COMMENT   Final    TSH 04/21/2020 0.710  0.400 - 4.000 uIU/mL Final    Specimen UA 04/22/2020 Urine, Catheterized   Final    Color, UA 04/22/2020 Yellow  Yellow, Straw, Laure Final    Appearance, UA 04/22/2020 Clear  Clear Final    pH, UA 04/22/2020 7.0  5.0 - 8.0 Final    Specific Gravity, UA 04/22/2020 1.015  1.005 - 1.030 Final    Protein, UA 04/22/2020 Negative  Negative Final    Glucose, UA 04/22/2020 Negative  Negative Final    Ketones, UA 04/22/2020 Negative  Negative Final    Bilirubin (UA) 04/22/2020 Negative  Negative Final    Occult Blood UA 04/22/2020 Negative  Negative Final    Nitrite, UA 04/22/2020 Negative  Negative Final    Urobilinogen, UA 04/22/2020 2.0-3.0* <2.0 EU/dL Final    Leukocytes, UA 04/22/2020 Negative  Negative Final    Alcohol, Medical, Serum 04/21/2020 <10  <10 mg/dL Final     aPTT 04/21/2020 28.9  21.0 - 32.0 sec Final    Prothrombin Time 04/21/2020 14.2* 9.0 - 12.5 sec Final    INR 04/21/2020 1.4* 0.8 - 1.2 Final    Ammonia 04/21/2020 205* 10 - 50 umol/L Final    Sodium 04/22/2020 138  136 - 145 mmol/L Final    Potassium 04/22/2020 3.9  3.5 - 5.1 mmol/L Final    Chloride 04/22/2020 107  95 - 110 mmol/L Final    CO2 04/22/2020 24  23 - 29 mmol/L Final    Glucose 04/22/2020 108  70 - 110 mg/dL Final    BUN, Bld 04/22/2020 11  8 - 23 mg/dL Final    Creatinine 04/22/2020 0.8  0.5 - 1.4 mg/dL Final    Calcium 04/22/2020 8.1* 8.7 - 10.5 mg/dL Final    Total Protein 04/22/2020 7.1  6.0 - 8.4 g/dL Final    Albumin 04/22/2020 2.6* 3.5 - 5.2 g/dL Final    Total Bilirubin 04/22/2020 2.0* 0.1 - 1.0 mg/dL Final    Alkaline Phosphatase 04/22/2020 145* 55 - 135 U/L Final    AST 04/22/2020 54* 10 - 40 U/L Final    ALT 04/22/2020 35  10 - 44 U/L Final    Anion Gap 04/22/2020 7* 8 - 16 mmol/L Final    eGFR if African American 04/22/2020 >60  >60 mL/min/1.73 m^2 Final    eGFR if non African American 04/22/2020 >60  >60 mL/min/1.73 m^2 Final    WBC 04/22/2020 2.36* 3.90 - 12.70 K/uL Final    RBC 04/22/2020 3.42* 4.00 - 5.40 M/uL Final    Hemoglobin 04/22/2020 10.9* 12.0 - 16.0 g/dL Final    Hematocrit 04/22/2020 33.2* 37.0 - 48.5 % Final    Mean Corpuscular Volume 04/22/2020 97  82 - 98 fL Final    Mean Corpuscular Hemoglobin 04/22/2020 31.9* 27.0 - 31.0 pg Final    Mean Corpuscular Hemoglobin Conc 04/22/2020 32.8  32.0 - 36.0 g/dL Final    RDW 04/22/2020 15.7* 11.5 - 14.5 % Final    Platelets 04/22/2020 71* 150 - 350 K/uL Final    MPV 04/22/2020 11.3  9.2 - 12.9 fL Final    Immature Granulocytes 04/22/2020 0.8* 0.0 - 0.5 % Final    Gran # (ANC) 04/22/2020 1.4* 1.8 - 7.7 K/uL Final    Immature Grans (Abs) 04/22/2020 0.02  0.00 - 0.04 K/uL Final    Lymph # 04/22/2020 0.4* 1.0 - 4.8 K/uL Final    Mono # 04/22/2020 0.5  0.3 - 1.0 K/uL Final    Eos # 04/22/2020 0.0  0.0 -  0.5 K/uL Final    Baso # 04/22/2020 0.02  0.00 - 0.20 K/uL Final    nRBC 04/22/2020 0  0 /100 WBC Final    Gran% 04/22/2020 59.0  38.0 - 73.0 % Final    Lymph% 04/22/2020 17.8* 18.0 - 48.0 % Final    Mono% 04/22/2020 19.9* 4.0 - 15.0 % Final    Eosinophil% 04/22/2020 1.7  0.0 - 8.0 % Final    Basophil% 04/22/2020 0.8  0.0 - 1.9 % Final    Differential Method 04/22/2020 Automated   Final    Prothrombin Time 04/22/2020 14.4* 9.0 - 12.5 sec Final    INR 04/22/2020 1.4* 0.8 - 1.2 Final    Magnesium 04/22/2020 1.9  1.6 - 2.6 mg/dL Final    Phosphorus 04/22/2020 3.1  2.7 - 4.5 mg/dL Final    Sodium 04/23/2020 140  136 - 145 mmol/L Final    Potassium 04/23/2020 3.3* 3.5 - 5.1 mmol/L Final    Chloride 04/23/2020 108  95 - 110 mmol/L Final    CO2 04/23/2020 23  23 - 29 mmol/L Final    Glucose 04/23/2020 105  70 - 110 mg/dL Final    BUN, Bld 04/23/2020 14  8 - 23 mg/dL Final    Creatinine 04/23/2020 0.9  0.5 - 1.4 mg/dL Final    Calcium 04/23/2020 7.6* 8.7 - 10.5 mg/dL Final    Total Protein 04/23/2020 6.7  6.0 - 8.4 g/dL Final    Albumin 04/23/2020 2.4* 3.5 - 5.2 g/dL Final    Total Bilirubin 04/23/2020 2.3* 0.1 - 1.0 mg/dL Final    Alkaline Phosphatase 04/23/2020 121  55 - 135 U/L Final    AST 04/23/2020 50* 10 - 40 U/L Final    ALT 04/23/2020 33  10 - 44 U/L Final    Anion Gap 04/23/2020 9  8 - 16 mmol/L Final    eGFR if African American 04/23/2020 >60  >60 mL/min/1.73 m^2 Final    eGFR if non African American 04/23/2020 >60  >60 mL/min/1.73 m^2 Final    WBC 04/23/2020 3.26* 3.90 - 12.70 K/uL Final    RBC 04/23/2020 3.38* 4.00 - 5.40 M/uL Final    Hemoglobin 04/23/2020 10.8* 12.0 - 16.0 g/dL Final    Hematocrit 04/23/2020 33.1* 37.0 - 48.5 % Final    Mean Corpuscular Volume 04/23/2020 98  82 - 98 fL Final    Mean Corpuscular Hemoglobin 04/23/2020 32.0* 27.0 - 31.0 pg Final    Mean Corpuscular Hemoglobin Conc 04/23/2020 32.6  32.0 - 36.0 g/dL Final    RDW 04/23/2020 15.8* 11.5 - 14.5 %  Final    Platelets 04/23/2020 86* 150 - 350 K/uL Final    MPV 04/23/2020 11.4  9.2 - 12.9 fL Final    Immature Granulocytes 04/23/2020 0.9* 0.0 - 0.5 % Final    Gran # (ANC) 04/23/2020 1.5* 1.8 - 7.7 K/uL Final    Immature Grans (Abs) 04/23/2020 0.03  0.00 - 0.04 K/uL Final    Lymph # 04/23/2020 0.9* 1.0 - 4.8 K/uL Final    Mono # 04/23/2020 0.7  0.3 - 1.0 K/uL Final    Eos # 04/23/2020 0.1  0.0 - 0.5 K/uL Final    Baso # 04/23/2020 0.05  0.00 - 0.20 K/uL Final    nRBC 04/23/2020 0  0 /100 WBC Final    Gran% 04/23/2020 45.4  38.0 - 73.0 % Final    Lymph% 04/23/2020 26.7  18.0 - 48.0 % Final    Mono% 04/23/2020 21.2* 4.0 - 15.0 % Final    Eosinophil% 04/23/2020 4.3  0.0 - 8.0 % Final    Basophil% 04/23/2020 1.5  0.0 - 1.9 % Final    Differential Method 04/23/2020 Automated   Final    Prothrombin Time 04/23/2020 14.3* 9.0 - 12.5 sec Final    INR 04/23/2020 1.4* 0.8 - 1.2 Final    Ammonia 04/23/2020 78* 10 - 50 umol/L Final      ?   Tumor markers   ?   ?   Imaging: US Guided Paracentesis  Narrative: EXAMINATION:  US GUIDED PARACENTESIS INC IMAGING    CLINICAL HISTORY:  ascites;    TECHNIQUE:  : Donal Fletcher    Written and verbal informed consent was obtained.  Targeted ultrasound was performed and the skin was marked in the right lower quadrant superficial to a safe pocket of abdominal ascites.  The area was prepped and draped in the usual sterile fashion.  A time-out was performed.  Lidocaine was instilled for local anesthesia.  5 Frisian catheter was inserted into the right lower quadrant with the assistance of Pawel Uriarte.   Approximately 2 L of serous ascites was withdrawn.  A sample was sent for additional analysis.  The catheter was removed.  Local hemostasis was achieved.  A sterile dressing was applied. The patient tolerated the procedure well.    FINDINGS:  Ultrasound images demonstrate a moderate volume of abdominal ascites and safe pocket for paracentesis in the  right lower quadrant.  Impression: Right lower quadrant paracentesis with removal of 2 L of ascitic fluid without difficulty.    Electronically signed by: Erick Hou MD  Date:    04/22/2020  Time:    14:58  X-Ray Abdomen AP 1 View (KUB)  Narrative: EXAMINATION:  XR ABDOMEN AP 1 VIEW    CLINICAL HISTORY:  Abdominal distension (gaseous)    TECHNIQUE:  Single view of the abdomen was performed.    COMPARISON:  CT 04/22/2020.    FINDINGS:  Nondiagnostic study with significant motion limitation.  Impression: Nondiagnostic study.  Correlate with CT scan same date of service.    Electronically signed by: Erick Hou MD  Date:    04/22/2020  Time:    07:29  X-Ray Chest 1 View  Narrative: EXAMINATION:  XR CHEST 1 VIEW    CLINICAL HISTORY:  NG tube;    TECHNIQUE:  Single frontal view of the chest was performed.    COMPARISON:  04/22/2020    FINDINGS:  Nasogastric tube appears satisfactory.  In comparison to the prior study, there is no adverse interval changes  Impression: In comparison to the prior study, there is no adverse interval changes    Electronically signed by: Erick Hou MD  Date:    04/22/2020  Time:    07:21  X-Ray Chest AP Portable  Narrative: EXAMINATION:  XR CHEST AP PORTABLE    CLINICAL HISTORY:  Suspected Covid-19 Virus Infection;    FINDINGS:  Single view of the chest.  Aorta demonstrates atherosclerotic disease.    Cardiac silhouette is normal.  No consolidation.  Subtle ground-glass opacity within the right midlung zone could reflect early airspace disease.  Atelectasis seen at the left lung base.  No evidence of pleural effusion or pneumothorax.  Bones demonstrate degenerative changes.  Impression: Subtle ground-glass opacity within the right midlung zone could reflect early airspace disease.  Continued follow-up is recommended.    Electronically signed by: Erick Hou MD  Date:    04/22/2020  Time:    07:20  CT Head Without Contrast  Narrative: EXAMINATION:  CT HEAD WITHOUT CONTRAST    CLINICAL  HISTORY:  Confusion/delirium, altered LOC, unexplained;    TECHNIQUE:  Low dose axial CT images obtained throughout the head without intravenous contrast. Sagittal and coronal reconstructions were performed.    COMPARISON:  None.    FINDINGS:  Intracranial compartment:    The brain parenchyma demonstrates areas of decreased attenuation with mild to moderate periventricular white matter consistent with chronic microvascular ischemic changes..  No parenchymal mass, hemorrhage, edema or major vascular distribution infarct.  Vascular calcifications are noted.    Mild prominence of the sulci and ventricles are consistent with age-related involutional changes.    No extra-axial blood or fluid collections.    Skull/extracranial contents (limited evaluation): No fracture. Mastoid air cells and paranasal sinuses are essentially clear.  Impression: Chronic microvascular ischemic changes.    All CT scans at this facility use dose modulation, iterative reconstruction, and/or weight based dosing when appropriate to reduce radiation dose to as low as reasonable achievable.    Electronically signed by: Erick Hou MD  Date:    04/22/2020  Time:    07:09  CT Abdomen Pelvis With Contrast  Narrative: EXAMINATION:  CT ABDOMEN PELVIS WITH CONTRAST    CLINICAL HISTORY:  Abdominal distension;    TECHNIQUE:  Low dose axial images, sagittal and coronal reformations were obtained from the lung bases to the pubic symphysis following the IV administration of 75 mL of Omnipaque 350.  Oral contrast was not administered.    COMPARISON:  04/22/2020    FINDINGS:  Heart: Normal size. No effusion.    Lung Bases: Clear.    Liver: Nodular configuration liver consistent with cirrhosis.  No liver mass is seen.  Portal vein is patent.    Gallbladder: Multiple gallstones with mild gallbladder distension.  Mild wall thickening.    Bile Ducts: No dilatation.    Pancreas: Atrophic.  No mass. No peripancreatic fat stranding.    Spleen: Moderate splenomegaly  measuring 18 cm.    Adrenals: Normal.    Kidneys/Ureters: Normal enhancement.  No mass or  hydroureteronephrosis.    Bladder: Collapsed    Reproductive organs: Uterus is not seen.    GI Tract/Mesentery: Moderate hiatal hernia.  Gastric rugal fold thickening and hyperemia noted consistent with gastritis.  Small duodenal diverticulum noted along the 2nd portion the duodenum.  No evidence of small-bowel obstruction however there is mild mucosal thickening throughout distal small bowel loops.  Significant thickening is seen at the level of the cecum, ascending and transverse colon.  Moderate constipation seen throughout the distal transverse and descending colon.  No evidence of appendicitis.    Peritoneal Space: Large amount of ascites seen throughout the abdomen and pelvis.    Retroperitoneum: Shotty retroperitoneal adenopathy.    Abdominal wall: Diffuse anasarca.    Vasculature: No aneurysm. Aorta demonstrates moderate atherosclerotic disease.    Bones: Postoperative changes are seen within the right hemipelvis.  Diffuse osteopenia noted.  Compression deformities throughout the thoracic and lumbar spine.  MRI can be obtained as clinically warranted.  Moderate degenerative changes lower lumbar spine.  Remote right-sided rib fracture noted.  Impression: Severe thickening of the cecum, ascending and transverse colon concerning for infectious or inflammatory colitis.  Findings can also be seen in the setting of portal colopathy.    Portal gastropathy or gastritis also suspected.    Multiple gallstones with mild gallbladder distension. Mild wall thickening.  Right upper quadrant ultrasound or HIDA scan can be obtained as clinically warranted.    Large volume of ascites throughout the abdomen.  Findings of cirrhosis and portal hypertension noted.    All CT scans at this facility use dose modulation, iterative reconstruction, and/or weight based dosing when appropriate to reduce radiation dose to as low as reasonable  achievable.    Electronically signed by: Erick Hou MD  Date:    04/22/2020  Time:    07:03     ?      Pathology:  Pathology Results  (Last 10 years)    None           ?   Assessment/Plan:       1. Anemia, unspecified type    2. Pancytopenia    3. Cirrhosis of liver with ascites, unspecified hepatic cirrhosis type          ?Pancytopenia  Pancytopenia is likely multifactorial ranging from bone marrow damage from alcohol to liver disease.  No evidence of active infection or drug induced bone marrow failure.  Moderate thrombocytopenia is likely related to alcohol and liver disease as well.  There may be a component of iron deficiency or nutritional deficiency.  Plan will be to check iron studies as well as B12 and folate.  Based on the above studies will determine if any therapeutic approach is necessary from the standpoint of Hematology.  No need for bone marrow biopsy at this time.    Cirrhosis of liver with ascites related to hepatitis C virus  Management by GI service.     ?   ?   Follow-Up: Follow up in about 4 weeks (around 5/27/2020).    Consult Start Time: 04/29/2020 11:20  Consult End Time: 04/29/2020 11:40      The patient location is:  home  The chief complaint leading to consultation is:  Pancytopenia  Visit type: audio only  Total time spent with patient:  20 min  Each patient to whom he or she provides medical services by telemedicine is:  (1) informed of the relationship between the physician and patient and the respective role of any other health care provider with respect to management of the patient; and (2) notified that he or she may decline to receive medical services by telemedicine and may withdraw from such care at any time.      NIGHAT ZAPATA Md., Ph.D  Hematology & Oncology Department  Phone #: [phone]

## 2020-04-29 NOTE — ASSESSMENT & PLAN NOTE
Pancytopenia is likely multifactorial ranging from bone marrow damage from alcohol to liver disease.  No evidence of active infection or drug induced bone marrow failure.  Moderate thrombocytopenia is likely related to alcohol and liver disease as well.  There may be a component of iron deficiency or nutritional deficiency.  Plan will be to check iron studies as well as B12 and folate.  Based on the above studies will determine if any therapeutic approach is necessary from the standpoint of Hematology.  No need for bone marrow biopsy at this time.

## 2020-04-30 ENCOUNTER — HOSPITAL ENCOUNTER (OUTPATIENT)
Dept: RADIOLOGY | Facility: HOSPITAL | Age: 60
Discharge: HOME | End: 2020-04-30
Attending: INTERNAL MEDICINE

## 2020-04-30 ENCOUNTER — HOSPITAL ENCOUNTER (INPATIENT)
Facility: HOSPITAL | Age: 60
LOS: 1 days | Discharge: HOME | DRG: 442 | End: 2020-05-01
Attending: EMERGENCY MEDICINE | Admitting: EMERGENCY MEDICINE
Payer: MEDICAID

## 2020-04-30 DIAGNOSIS — K76.82 HEPATIC ENCEPHALOPATHY: Primary | ICD-10-CM

## 2020-04-30 DIAGNOSIS — R41.82 AMS (ALTERED MENTAL STATUS): ICD-10-CM

## 2020-04-30 DIAGNOSIS — Z46.59 ENCOUNTER FOR NASOGASTRIC (NG) TUBE PLACEMENT: ICD-10-CM

## 2020-04-30 PROBLEM — E72.20 HYPERAMMONEMIA: Status: ACTIVE | Noted: 2020-04-30

## 2020-04-30 LAB
ALBUMIN SERPL BCP-MCNC: 2.5 G/DL (ref 3.5–5.2)
ALP SERPL-CCNC: 184 U/L (ref 55–135)
ALT SERPL W/O P-5'-P-CCNC: 28 U/L (ref 10–44)
AMMONIA PLAS-SCNC: 108 UMOL/L (ref 10–50)
AMPHET+METHAMPHET UR QL: NORMAL
ANION GAP SERPL CALC-SCNC: 10 MMOL/L (ref 8–16)
AST SERPL-CCNC: 50 U/L (ref 10–40)
BARBITURATES UR QL SCN>200 NG/ML: NEGATIVE
BASOPHILS # BLD AUTO: ABNORMAL K/UL (ref 0–0.2)
BASOPHILS NFR BLD: 0 % (ref 0–1.9)
BENZODIAZ UR QL SCN>200 NG/ML: NEGATIVE
BILIRUB SERPL-MCNC: 1.4 MG/DL (ref 0.1–1)
BILIRUB UR QL STRIP: NEGATIVE
BNP SERPL-MCNC: 115 PG/ML (ref 0–99)
BUN SERPL-MCNC: 15 MG/DL (ref 8–23)
BZE UR QL SCN: NEGATIVE
CALCIUM SERPL-MCNC: 8.1 MG/DL (ref 8.7–10.5)
CANNABINOIDS UR QL SCN: NEGATIVE
CHLORIDE SERPL-SCNC: 108 MMOL/L (ref 95–110)
CK SERPL-CCNC: 90 U/L (ref 20–180)
CLARITY UR: CLEAR
CO2 SERPL-SCNC: 23 MMOL/L (ref 23–29)
COLOR UR: YELLOW
CREAT SERPL-MCNC: 1 MG/DL (ref 0.5–1.4)
CREAT UR-MCNC: 37.2 MG/DL (ref 15–325)
DACRYOCYTES BLD QL SMEAR: ABNORMAL
DIFFERENTIAL METHOD: ABNORMAL
EOSINOPHIL # BLD AUTO: ABNORMAL K/UL (ref 0–0.5)
EOSINOPHIL NFR BLD: 0 % (ref 0–8)
ERYTHROCYTE [DISTWIDTH] IN BLOOD BY AUTOMATED COUNT: 16 % (ref 11.5–14.5)
EST. GFR  (AFRICAN AMERICAN): >60 ML/MIN/1.73 M^2
EST. GFR  (NON AFRICAN AMERICAN): >60 ML/MIN/1.73 M^2
ETHANOL SERPL-MCNC: <10 MG/DL
GLUCOSE SERPL-MCNC: 114 MG/DL (ref 70–110)
GLUCOSE UR QL STRIP: NEGATIVE
HCT VFR BLD AUTO: 30.7 % (ref 37–48.5)
HGB BLD-MCNC: 10.1 G/DL (ref 12–16)
HGB UR QL STRIP: NEGATIVE
HYPOCHROMIA BLD QL SMEAR: ABNORMAL
IMM GRANULOCYTES # BLD AUTO: ABNORMAL K/UL (ref 0–0.04)
IMM GRANULOCYTES NFR BLD AUTO: ABNORMAL % (ref 0–0.5)
KETONES UR QL STRIP: NEGATIVE
LEUKOCYTE ESTERASE UR QL STRIP: NEGATIVE
LYMPHOCYTES # BLD AUTO: ABNORMAL K/UL (ref 1–4.8)
LYMPHOCYTES NFR BLD: 37 % (ref 18–48)
MCH RBC QN AUTO: 31.6 PG (ref 27–31)
MCHC RBC AUTO-ENTMCNC: 32.9 G/DL (ref 32–36)
MCV RBC AUTO: 96 FL (ref 82–98)
METHADONE UR QL SCN>300 NG/ML: NEGATIVE
MONOCYTES # BLD AUTO: ABNORMAL K/UL (ref 0.3–1)
MONOCYTES NFR BLD: 12 % (ref 4–15)
NEUTROPHILS # BLD AUTO: ABNORMAL K/UL (ref 1.8–7.7)
NEUTROPHILS NFR BLD: 51 % (ref 38–73)
NITRITE UR QL STRIP: NEGATIVE
NRBC BLD-RTO: 0 /100 WBC
OPIATES UR QL SCN: NEGATIVE
OVALOCYTES BLD QL SMEAR: ABNORMAL
PCP UR QL SCN>25 NG/ML: NEGATIVE
PH UR STRIP: 7 [PH] (ref 5–8)
PLATELET # BLD AUTO: 80 K/UL (ref 150–350)
PMV BLD AUTO: 11 FL (ref 9.2–12.9)
POCT GLUCOSE: 114 MG/DL (ref 70–110)
POIKILOCYTOSIS BLD QL SMEAR: SLIGHT
POTASSIUM SERPL-SCNC: 3.5 MMOL/L (ref 3.5–5.1)
PROT SERPL-MCNC: 6.8 G/DL (ref 6–8.4)
PROT UR QL STRIP: NEGATIVE
RBC # BLD AUTO: 3.2 M/UL (ref 4–5.4)
SARS-COV-2 RDRP RESP QL NAA+PROBE: NEGATIVE
SODIUM SERPL-SCNC: 141 MMOL/L (ref 136–145)
SP GR UR STRIP: 1.01 (ref 1–1.03)
TOXICOLOGY INFORMATION: NORMAL
TROPONIN I SERPL DL<=0.01 NG/ML-MCNC: <0.006 NG/ML (ref 0–0.03)
URN SPEC COLLECT METH UR: NORMAL
UROBILINOGEN UR STRIP-ACNC: NEGATIVE EU/DL
VIT B12 SERPL-MCNC: 720 PG/ML (ref 210–950)
WBC # BLD AUTO: 1.87 K/UL (ref 3.9–12.7)

## 2020-04-30 PROCEDURE — 21400001 HC TELEMETRY ROOM

## 2020-04-30 PROCEDURE — 85007 BL SMEAR W/DIFF WBC COUNT: CPT

## 2020-04-30 PROCEDURE — 82550 ASSAY OF CK (CPK): CPT

## 2020-04-30 PROCEDURE — 96372 THER/PROPH/DIAG INJ SC/IM: CPT

## 2020-04-30 PROCEDURE — 80053 COMPREHEN METABOLIC PANEL: CPT

## 2020-04-30 PROCEDURE — 93005 ELECTROCARDIOGRAM TRACING: CPT

## 2020-04-30 PROCEDURE — 25000003 PHARM REV CODE 250: Performed by: NURSE PRACTITIONER

## 2020-04-30 PROCEDURE — 93010 EKG 12-LEAD: ICD-10-PCS | Mod: ,,, | Performed by: INTERNAL MEDICINE

## 2020-04-30 PROCEDURE — 36415 COLL VENOUS BLD VENIPUNCTURE: CPT

## 2020-04-30 PROCEDURE — 84484 ASSAY OF TROPONIN QUANT: CPT

## 2020-04-30 PROCEDURE — 80320 DRUG SCREEN QUANTALCOHOLS: CPT

## 2020-04-30 PROCEDURE — 85027 COMPLETE CBC AUTOMATED: CPT

## 2020-04-30 PROCEDURE — 81003 URINALYSIS AUTO W/O SCOPE: CPT | Mod: 59

## 2020-04-30 PROCEDURE — U0002 COVID-19 LAB TEST NON-CDC: HCPCS

## 2020-04-30 PROCEDURE — 80307 DRUG TEST PRSMV CHEM ANLYZR: CPT

## 2020-04-30 PROCEDURE — 63600175 PHARM REV CODE 636 W HCPCS: Performed by: EMERGENCY MEDICINE

## 2020-04-30 PROCEDURE — 83880 ASSAY OF NATRIURETIC PEPTIDE: CPT

## 2020-04-30 PROCEDURE — 82607 VITAMIN B-12: CPT

## 2020-04-30 PROCEDURE — 82140 ASSAY OF AMMONIA: CPT

## 2020-04-30 PROCEDURE — 93010 ELECTROCARDIOGRAM REPORT: CPT | Mod: ,,, | Performed by: INTERNAL MEDICINE

## 2020-04-30 PROCEDURE — 25000003 PHARM REV CODE 250: Performed by: EMERGENCY MEDICINE

## 2020-04-30 PROCEDURE — 11000001 HC ACUTE MED/SURG PRIVATE ROOM

## 2020-04-30 PROCEDURE — 96374 THER/PROPH/DIAG INJ IV PUSH: CPT

## 2020-04-30 PROCEDURE — 82962 GLUCOSE BLOOD TEST: CPT

## 2020-04-30 PROCEDURE — 99291 CRITICAL CARE FIRST HOUR: CPT | Mod: 25

## 2020-04-30 RX ORDER — ONDANSETRON 2 MG/ML
4 INJECTION INTRAMUSCULAR; INTRAVENOUS EVERY 8 HOURS PRN
Status: DISCONTINUED | OUTPATIENT
Start: 2020-04-30 | End: 2020-05-01 | Stop reason: HOSPADM

## 2020-04-30 RX ORDER — LACTULOSE 10 G/15ML
30 SOLUTION ORAL
Status: COMPLETED | OUTPATIENT
Start: 2020-04-30 | End: 2020-04-30

## 2020-04-30 RX ORDER — DEXTROSE MONOHYDRATE 100 MG/ML
12.5 INJECTION, SOLUTION INTRAVENOUS
Status: DISCONTINUED | OUTPATIENT
Start: 2020-04-30 | End: 2020-05-01 | Stop reason: HOSPADM

## 2020-04-30 RX ORDER — GLUCAGON 1 MG
1 KIT INJECTION
Status: DISCONTINUED | OUTPATIENT
Start: 2020-04-30 | End: 2020-05-01 | Stop reason: HOSPADM

## 2020-04-30 RX ORDER — IBUPROFEN 200 MG
16 TABLET ORAL
Status: DISCONTINUED | OUTPATIENT
Start: 2020-04-30 | End: 2020-05-01 | Stop reason: HOSPADM

## 2020-04-30 RX ORDER — LACTULOSE 10 G/15ML
30 SOLUTION ORAL
Status: DISCONTINUED | OUTPATIENT
Start: 2020-04-30 | End: 2020-04-30

## 2020-04-30 RX ORDER — ZIPRASIDONE MESYLATE 20 MG/ML
20 INJECTION, POWDER, LYOPHILIZED, FOR SOLUTION INTRAMUSCULAR
Status: COMPLETED | OUTPATIENT
Start: 2020-04-30 | End: 2020-04-30

## 2020-04-30 RX ORDER — IBUPROFEN 200 MG
24 TABLET ORAL
Status: DISCONTINUED | OUTPATIENT
Start: 2020-04-30 | End: 2020-05-01 | Stop reason: HOSPADM

## 2020-04-30 RX ORDER — SODIUM CHLORIDE 0.9 % (FLUSH) 0.9 %
10 SYRINGE (ML) INJECTION
Status: DISCONTINUED | OUTPATIENT
Start: 2020-04-30 | End: 2020-05-01 | Stop reason: HOSPADM

## 2020-04-30 RX ORDER — LACTULOSE 10 G/15ML
15 SOLUTION ORAL 3 TIMES DAILY
Status: DISCONTINUED | OUTPATIENT
Start: 2020-04-30 | End: 2020-05-01 | Stop reason: HOSPADM

## 2020-04-30 RX ADMIN — LACTULOSE 15 G: 20 SOLUTION ORAL at 03:04

## 2020-04-30 RX ADMIN — LACTULOSE 30 G: 20 SOLUTION ORAL at 01:04

## 2020-04-30 RX ADMIN — LACTULOSE 15 G: 20 SOLUTION ORAL at 08:04

## 2020-04-30 RX ADMIN — ZIPRASIDONE MESYLATE 20 MG: 20 INJECTION, POWDER, LYOPHILIZED, FOR SOLUTION INTRAMUSCULAR at 09:04

## 2020-04-30 RX ADMIN — LORAZEPAM 1 MG: 2 INJECTION INTRAMUSCULAR; INTRAVENOUS at 12:04

## 2020-04-30 NOTE — ED PROVIDER NOTES
SCRIBE #1 NOTE: I, Brant Messer, am scribing for, and in the presence of, Tahir Short MD. I have scribed the entire note.      History      Chief Complaint   Patient presents with    Altered Mental Status     distended abdomen and confusion     Review of patient's allergies indicates:  No Known Allergies     HPI   HPI    4/30/2020, 8:21 AM   History obtained from EMS  HPI and ROS limited secondary to pt's AMS      History of Present Illness: Jossy Siegel is a 59 y.o. female patient with a PMHx of alcoholic cirrhosis of the liver, ascites, hepatitis who presents to the Emergency Department for AMS. Symptoms are constant and moderate in severity. No mitigating or exacerbating factors reported. Associated sxs include abdominal distention. Patient is unable to provide any pertinent negatives at this time. No prior Tx reported. No further complaints or concerns at this time.     Arrival mode: EMS    PCP: Primary Doctor No       Past Medical History:  Past Medical History:   Diagnosis Date    Alcoholic cirrhosis of liver with ascites     Cirrhosis     Hepatitis     Pancytopenia 2/5/2020    Last Assessment & Plan:  History & Physical Chronic.  Stable.  Follow-up repeat CBC and INR in a.m.  Discharge Summary  Chronic.  Stable. Likely secondary to cirrhosis. Follow-up  Chronic.  Stable. Likely secondary to cirrhosis.       Past Surgical History:  Past Surgical History:   Procedure Laterality Date    PARACENTESIS           Family History:  History reviewed. No pertinent family history.    Social History:  Social History     Tobacco Use    Smoking status: Current Every Day Smoker     Packs/day: 0.25     Years: 45.00     Pack years: 11.25     Types: Cigarettes    Smokeless tobacco: Never Used   Substance and Sexual Activity    Alcohol use: Not Currently    Drug use: Never    Sexual activity: Not on file       ROS   Review of Systems   Unable to perform ROS: Mental status change   Gastrointestinal:  Positive for abdominal distention.     Physical Exam      Initial Vitals   BP Pulse Resp Temp SpO2   04/30/20 0836 04/30/20 0836 04/30/20 0849 04/30/20 0832 04/30/20 0836   136/85 99 20 97.3 °F (36.3 °C) 100 %      MAP       --                 Physical Exam  Nursing Notes and Vital Signs Reviewed.  Constitutional: Patient is in no acute distress.   Head: Atraumatic. Normocephalic.  Eyes: PERRL. EOM intact. Conjunctivae are not pale. No scleral icterus.  ENT: Mucous membranes are moist. Oropharynx is clear and symmetric.    Neck: Supple. Full ROM. No lymphadenopathy.  Cardiovascular: Regular rate. Regular rhythm. No murmurs, rubs, or gallops. Distal pulses are 2+ and symmetric.  Pulmonary/Chest: No respiratory distress. Clear to auscultation bilaterally. No wheezing or rales.  Abdominal: Distended. There is no tenderness.  No rebound, guarding, or rigidity.   Musculoskeletal: Moves all extremities. No obvious deformities. No edema.  Skin: Warm and dry.  Neurological:  Awake. Full neuro exam limited secondary to pt's AMS.  Psychiatric: Full psych exam limited secondary to pt's AMS.    ED Course    Critical Care  Date/Time: 4/30/2020 1:45 PM  Performed by: Tahir Short MD  Authorized by: Tahir Short MD   Direct patient critical care time: 35 minutes  Additional history critical care time: 5 minutes  Ordering / reviewing critical care time: 10 minutes  Documentation critical care time: 5 minutes  Consulting other physicians critical care time: 5 minutes  Total critical care time (exclusive of procedural time) : 60 minutes  Critical care time was exclusive of separately billable procedures and treating other patients and teaching time.  Critical care was necessary to treat or prevent imminent or life-threatening deterioration of the following conditions: Hepatic encephalopathy.  Critical care was time spent personally by me on the following activities: blood draw for specimens, development of treatment plan  with patient or surrogate, discussions with consultants, interpretation of cardiac output measurements, evaluation of patient's response to treatment, examination of patient, obtaining history from patient or surrogate, ordering and performing treatments and interventions, ordering and review of laboratory studies, ordering and review of radiographic studies, pulse oximetry and re-evaluation of patient's condition.        ED Vital Signs:  Vitals:    04/30/20 0902 04/30/20 0928 04/30/20 0932 04/30/20 1002   BP: 135/83 129/77 129/63 130/77   Pulse: 100 103 89 101   Resp:   16 15   Temp:       TempSrc:       SpO2: 99% 99% 98% 99%   Weight:        04/30/20 1005 04/30/20 1017 04/30/20 1032 04/30/20 1126   BP:  119/67 136/73    Pulse:  110 102    Resp:  18 12    Temp:    97 °F (36.1 °C)   TempSrc:    Axillary   SpO2:  98% 98%    Weight: 60.5 kg (133 lb 4.8 oz)       04/30/20 1215 04/30/20 1231 04/30/20 1304 04/30/20 1305   BP: 132/67 132/69 (!) 88/53    Pulse: 101 101  94   Resp: 15 14  14   Temp:       TempSrc:       SpO2: 97% 97%  99%   Weight:        04/30/20 1344 04/30/20 1401 04/30/20 1403   BP: 108/60 (!) 96/54    Pulse: 93 93    Resp: 15 14    Temp:   97.3 °F (36.3 °C)   TempSrc:   Axillary   SpO2: 98% 99%    Weight:          Abnormal Lab Results:  Labs Reviewed   CBC W/ AUTO DIFFERENTIAL - Abnormal; Notable for the following components:       Result Value    WBC 1.87 (*)     RBC 3.20 (*)     Hemoglobin 10.1 (*)     Hematocrit 30.7 (*)     Mean Corpuscular Hemoglobin 31.6 (*)     RDW 16.0 (*)     Platelets 80 (*)     All other components within normal limits    Narrative:     WBC critical result(s) called and verbal readback obtained from Dr. Short by ANGELINA 04/30/2020 10:39   COMPREHENSIVE METABOLIC PANEL - Abnormal; Notable for the following components:    Glucose 114 (*)     Calcium 8.1 (*)     Albumin 2.5 (*)     Total Bilirubin 1.4 (*)     Alkaline Phosphatase 184 (*)     AST 50 (*)     All other components  within normal limits   AMMONIA - Abnormal; Notable for the following components:    Ammonia 108 (*)     All other components within normal limits   B-TYPE NATRIURETIC PEPTIDE - Abnormal; Notable for the following components:     (*)     All other components within normal limits   POCT GLUCOSE - Abnormal; Notable for the following components:    POCT Glucose 114 (*)     All other components within normal limits   URINALYSIS, REFLEX TO URINE CULTURE    Narrative:     Preferred Collection Type->Urine, Clean Catch   CK   TROPONIN I   DRUG SCREEN PANEL, URINE EMERGENCY    Narrative:     Preferred Collection Type->Urine, Clean Catch   ALCOHOL,MEDICAL (ETHANOL)   SARS-COV-2 RNA AMPLIFICATION, QUAL        All Lab Results:  Results for orders placed or performed during the hospital encounter of 04/30/20   CBC auto differential   Result Value Ref Range    WBC 1.87 (LL) 3.90 - 12.70 K/uL    RBC 3.20 (L) 4.00 - 5.40 M/uL    Hemoglobin 10.1 (L) 12.0 - 16.0 g/dL    Hematocrit 30.7 (L) 37.0 - 48.5 %    Mean Corpuscular Volume 96 82 - 98 fL    Mean Corpuscular Hemoglobin 31.6 (H) 27.0 - 31.0 pg    Mean Corpuscular Hemoglobin Conc 32.9 32.0 - 36.0 g/dL    RDW 16.0 (H) 11.5 - 14.5 %    Platelets 80 (L) 150 - 350 K/uL    MPV 11.0 9.2 - 12.9 fL    Immature Granulocytes CANCELED 0.0 - 0.5 %    Gran # (ANC) CANCELED 1.8 - 7.7 K/uL    Immature Grans (Abs) CANCELED 0.00 - 0.04 K/uL    Lymph # CANCELED 1.0 - 4.8 K/uL    Mono # CANCELED 0.3 - 1.0 K/uL    Eos # CANCELED 0.0 - 0.5 K/uL    Baso # CANCELED 0.00 - 0.20 K/uL    nRBC 0 0 /100 WBC    Gran% 51.0 38.0 - 73.0 %    Lymph% 37.0 18.0 - 48.0 %    Mono% 12.0 4.0 - 15.0 %    Eosinophil% 0.0 0.0 - 8.0 %    Basophil% 0.0 0.0 - 1.9 %    Poik Slight     Hypo Occasional     Ovalocytes Occasional     Tear Drop Cells Occasional     Differential Method Manual    Comprehensive metabolic panel   Result Value Ref Range    Sodium 141 136 - 145 mmol/L    Potassium 3.5 3.5 - 5.1 mmol/L    Chloride  108 95 - 110 mmol/L    CO2 23 23 - 29 mmol/L    Glucose 114 (H) 70 - 110 mg/dL    BUN, Bld 15 8 - 23 mg/dL    Creatinine 1.0 0.5 - 1.4 mg/dL    Calcium 8.1 (L) 8.7 - 10.5 mg/dL    Total Protein 6.8 6.0 - 8.4 g/dL    Albumin 2.5 (L) 3.5 - 5.2 g/dL    Total Bilirubin 1.4 (H) 0.1 - 1.0 mg/dL    Alkaline Phosphatase 184 (H) 55 - 135 U/L    AST 50 (H) 10 - 40 U/L    ALT 28 10 - 44 U/L    Anion Gap 10 8 - 16 mmol/L    eGFR if African American >60 >60 mL/min/1.73 m^2    eGFR if non African American >60 >60 mL/min/1.73 m^2   Urinalysis, Reflex to Urine Culture Urine, Clean Catch   Result Value Ref Range    Specimen UA Urine, Catheterized     Color, UA Yellow Yellow, Straw, Laure    Appearance, UA Clear Clear    pH, UA 7.0 5.0 - 8.0    Specific Gravity, UA 1.010 1.005 - 1.030    Protein, UA Negative Negative    Glucose, UA Negative Negative    Ketones, UA Negative Negative    Bilirubin (UA) Negative Negative    Occult Blood UA Negative Negative    Nitrite, UA Negative Negative    Urobilinogen, UA Negative <2.0 EU/dL    Leukocytes, UA Negative Negative   Ammonia   Result Value Ref Range    Ammonia 108 (H) 10 - 50 umol/L   Brain Natriuretic Peptide   Result Value Ref Range     (H) 0 - 99 pg/mL   CK   Result Value Ref Range    CPK 90 20 - 180 U/L   Troponin I   Result Value Ref Range    Troponin I <0.006 0.000 - 0.026 ng/mL   Drug screen panel, emergency   Result Value Ref Range    Benzodiazepines Negative     Methadone metabolites Negative     Cocaine (Metab.) Negative     Opiate Scrn, Ur Negative     Barbiturate Screen, Ur Negative     Amphetamine Screen, Ur Presumptive Positive     THC Negative     Phencyclidine Negative     Creatinine, Random Ur 37.2 15.0 - 325.0 mg/dL    Toxicology Information SEE COMMENT    Ethanol   Result Value Ref Range    Alcohol, Medical, Serum <10 <10 mg/dL   COVID-19 Rapid Screening   Result Value Ref Range    SARS-CoV-2 RNA, Amplification, Qual Negative Negative   POCT glucose   Result  Value Ref Range    POCT Glucose 114 (H) 70 - 110 mg/dL     Imaging Results:  Imaging Results          X-Ray Abdomen AP 1 View (KUB) (Final result)  Result time 04/30/20 13:38:06    Final result by Erick Hou MD (04/30/20 13:38:06)                 Impression:      Nasogastric tube projects within the stomach with the side hole just beyond the GE junction.      Electronically signed by: Erick Hou MD  Date:    04/30/2020  Time:    13:38             Narrative:    EXAMINATION:  XR ABDOMEN AP 1 VIEW    CLINICAL HISTORY:  Encounter for fitting and adjustment of other gastrointestinal appliance and device    TECHNIQUE:  Single view of the abdomen was performed.    COMPARISON:  None    FINDINGS:  Nasogastric tube projects within the stomach with the side hole just beyond the GE junction.  Nonobstructive bowel gas pattern.  Mild constipation.    No obvious free air.  No portal venous gas.    No acute fracture. Mild DJD. Lung are grossly clear.                               CT Head Without Contrast (Final result)  Result time 04/30/20 11:25:58    Final result by Erick Hou MD (04/30/20 11:25:58)                 Impression:      No acute findings.    All CT scans at this facility use dose modulation, iterative reconstruction, and/or weight based dosing when appropriate to reduce radiation dose to as low as reasonable achievable.      Electronically signed by: Erick Hou MD  Date:    04/30/2020  Time:    11:25             Narrative:    EXAMINATION:  CT HEAD WITHOUT CONTRAST    CLINICAL HISTORY:  Confusion/delirium, altered LOC, unexplained;    TECHNIQUE:  Low dose axial CT images obtained throughout the head without intravenous contrast. Sagittal and coronal reconstructions were performed.    COMPARISON:  04/22/2020    FINDINGS:  Intracranial compartment:    The brain parenchyma demonstrates areas of decreased attenuation with mild to moderate periventricular white matter consistent with chronic microvascular  ischemic changes..  No parenchymal mass, hemorrhage, edema or major vascular distribution infarct.  Vascular calcifications are noted.    Mild prominence of the sulci and ventricles are consistent with age-related involutional changes.    No extra-axial blood or fluid collections.    Skull/extracranial contents (limited evaluation): No fracture.  Trace fluid is seen within the right mastoid air cells.  No fluid levels.  Mastoid air cells and paranasal sinuses are essentially clear.                               X-Ray Chest AP Portable (Final result)  Result time 04/30/20 09:55:42    Final result by Kory Valles Jr., MD (04/30/20 09:55:42)                 Impression:      No acute findings.      Electronically signed by: Kory Valles Jr., MD  Date:    04/30/2020  Time:    09:55             Narrative:    EXAMINATION:  XR CHEST AP PORTABLE    CLINICAL HISTORY:  ams;    COMPARISON:  Prior radiograph from April 22, 2020.    FINDINGS:  Interval removal nasogastric tube.  Bandlike atelectasis or scarring within the periphery of the left lower lobe is unchanged.  The remaining lungs are clear.  No pleural fluid or pneumothorax.  Heart size within normal limits.  No significant bony findings.                                  EKG Readings: (Independently Interpreted)   Rhythm: Normal Sinus Rhythm. Heart Rate: 98. Ectopy: No Ectopy. Conduction: Normal. ST Segments: Normal ST Segments. T Waves: Normal. Clinical Impression: Normal Sinus Rhythm        The Emergency Provider reviewed the vital signs and test results, which are outlined above.    ED Discussion     1:18 PM: Discussed pt's case with Dr. Mehta (Pulmonary Disease) who recommends admission to Hospital Medicine, as there are currently no available beds in ICU.    1:38 PM: Discussed case with Nusrat Arellano NP (Hospital Medicine). Dr. Núñez agrees with current care and management of pt and accepts admission.   Admitting Service: Hospital Medicine  Admitting  Physician: Dr. Núñez  Admit to: Obs Med Tele    1:39 PM: Re-evaluated pt. I have discussed test results, shared treatment plan, and the need for admission with family. Family expresses understanding at this time and agree with all information. All questions answered. Family has no further questions or concerns at this time. Pt is ready for admit.         ED Medication(s):  Medications   ziprasidone injection 20 mg (20 mg Intramuscular Given 4/30/20 0989)   lorazepam (ATIVAN) injection 1 mg (1 mg Intravenous Given 4/30/20 1245)   lactulose 20 gram/30 mL solution Soln 30 g (30 g Oral Given 4/30/20 1345)          New Prescriptions    No medications on file         Medical Decision Making    Medical Decision Making:   Clinical Tests:   Lab Tests: Ordered and Reviewed  Radiological Study: Ordered and Reviewed  Medical Tests: Ordered and Reviewed           Scribe Attestation:   Scribe #1: I performed the above scribed service and the documentation accurately describes the services I performed. I attest to the accuracy of the note.    Attending:   Physician Attestation Statement for Scribe #1: I, Tahir Short MD, personally performed the services described in this documentation, as scribed by Brant Messer, in my presence, and it is both accurate and complete.          Clinical Impression       ICD-10-CM ICD-9-CM   1. Hepatic encephalopathy K72.90 572.2   2. AMS (altered mental status) R41.82 780.97   3. Encounter for nasogastric (NG) tube placement Z46.59 V53.59       Disposition:   Disposition: Placed in Observation  Condition: Fair         Tahir Short MD  04/30/20 4023

## 2020-04-30 NOTE — SUBJECTIVE & OBJECTIVE
Past Medical History:   Diagnosis Date    Alcoholic cirrhosis of liver with ascites     Cirrhosis     Hepatitis     Pancytopenia 2/5/2020    Last Assessment & Plan:  History & Physical Chronic.  Stable.  Follow-up repeat CBC and INR in a.m.  Discharge Summary  Chronic.  Stable. Likely secondary to cirrhosis. Follow-up  Chronic.  Stable. Likely secondary to cirrhosis.       Past Surgical History:   Procedure Laterality Date    PARACENTESIS         Review of patient's allergies indicates:  No Known Allergies    No current facility-administered medications on file prior to encounter.      Current Outpatient Medications on File Prior to Encounter   Medication Sig    albuterol (PROVENTIL/VENTOLIN HFA) 90 mcg/actuation inhaler Inhale 2 puffs into the lungs every 6 (six) hours.    amitriptyline (ELAVIL) 25 MG tablet Take 25 mg by mouth every evening.    ciprofloxacin HCl (CIPRO) 500 MG tablet Take 1 tablet (500 mg) by mouth 2 times a week (Sunday and Wednesday)    furosemide (LASIX) 40 MG tablet Take 1 tablet (40 mg total) by mouth 2 (two) times daily.    lactulose 10 gram/15 ml (CHRONULAC) 10 gram/15 mL (15 mL) solution Take 15 mLs (10 g total) by mouth 3 (three) times daily.    metoprolol tartrate (LOPRESSOR) 25 MG tablet Take 1 tablet (25 mg total) by mouth 2 (two) times daily.    ondansetron (ZOFRAN-ODT) 4 MG TbDL Take 1 tablet (4 mg total) by mouth every 8 (eight) hours as needed (nausea).    pantoprazole (PROTONIX) 40 MG tablet Take 40 mg by mouth 2 (two) times daily.    spironolactone (ALDACTONE) 100 MG tablet Take 1 tablet (100 mg total) by mouth 2 (two) times daily.     Family History     None        Tobacco Use    Smoking status: Current Every Day Smoker     Packs/day: 0.25     Years: 45.00     Pack years: 11.25     Types: Cigarettes    Smokeless tobacco: Never Used   Substance and Sexual Activity    Alcohol use: Not Currently    Drug use: Never    Sexual activity: Not on file     Review of  Systems   Unable to perform ROS: Mental status change     Objective:     Vital Signs (Most Recent):  Temp: 97.3 °F (36.3 °C) (04/30/20 1403)  Pulse: 97 (04/30/20 1501)  Resp: 15 (04/30/20 1501)  BP: 133/76 (04/30/20 1501)  SpO2: 100 % (04/30/20 1501) Vital Signs (24h Range):  Temp:  [97 °F (36.1 °C)-97.3 °F (36.3 °C)] 97.3 °F (36.3 °C)  Pulse:  [] 97  Resp:  [12-20] 15  SpO2:  [97 %-100 %] 100 %  BP: ()/(53-86) 133/76     Weight: 60.5 kg (133 lb 4.8 oz)  Body mass index is 24.38 kg/m².    Physical Exam   Constitutional: She appears well-developed and well-nourished. She appears ill.   Thin, CF resting comfortably in bed in NAD.    HENT:   Head: Normocephalic and atraumatic.       Mouth/Throat: Oropharynx is clear and moist.   Eyes: Pupils are equal, round, and reactive to light. Conjunctivae are normal.   Neck: Normal range of motion. Neck supple.   Cardiovascular: Normal rate, regular rhythm, normal heart sounds and intact distal pulses. Exam reveals no gallop and no friction rub.   No murmur heard.  Pulmonary/Chest: Effort normal and breath sounds normal. No respiratory distress. She has no wheezes. She has no rales. She exhibits no tenderness.   Abdominal: Soft. Bowel sounds are normal. She exhibits distension. She exhibits no mass. There is no tenderness.   Genitourinary:   Genitourinary Comments: House cath patent   Musculoskeletal: She exhibits no edema or tenderness.   Bilateral soft wrist restraints.   Neurological: GCS eye subscore is 2. GCS verbal subscore is 2. GCS motor subscore is 5.   Eyes closed on exam.  Patient withdraws to painful stimulus; upon stimulation she begins thrashing head back and forth and moves all extremities.  Does not follow commands.  PERRLA, size 3 bilaterally with brisk response.     Skin: Skin is warm and dry. No rash noted. No erythema.   Psychiatric:   Restless   Nursing note and vitals reviewed.        CRANIAL NERVES     CN III, IV, VI   Pupils are equal, round,  and reactive to light.       Significant Labs:   CBC:   Recent Labs   Lab 04/30/20  1015   WBC 1.87*   HGB 10.1*   HCT 30.7*   PLT 80*     CMP:   Recent Labs   Lab 04/30/20  1015      K 3.5      CO2 23   *   BUN 15   CREATININE 1.0   CALCIUM 8.1*   PROT 6.8   ALBUMIN 2.5*   BILITOT 1.4*   ALKPHOS 184*   AST 50*   ALT 28   ANIONGAP 10   EGFRNONAA >60     Cardiac Markers:   Recent Labs   Lab 04/30/20  1015   *     Coagulation: No results for input(s): PT, INR, APTT in the last 48 hours.  Troponin:   Recent Labs   Lab 04/30/20  1015   TROPONINI <0.006     Urine Studies:   Recent Labs   Lab 04/30/20  0852   COLORU Yellow   APPEARANCEUA Clear   PHUR 7.0   SPECGRAV 1.010   PROTEINUA Negative   GLUCUA Negative   KETONESU Negative   BILIRUBINUA Negative   OCCULTUA Negative   NITRITE Negative   UROBILINOGEN Negative   LEUKOCYTESUR Negative       Significant Imaging: I have reviewed all pertinent imaging results/findings within the past 24 hours.

## 2020-04-30 NOTE — PLAN OF CARE
SW spoke with patient's spouse by phone to assess for discharge planning and obtained the following information:  Patient lives at home with her spouse, who is her help at home and helps her to manage her care. It should be noted that the patient's spouse was highly knowledgeable of the patient's condition, the medications she takes, and her needs.  Patient was utilizing a rolling walker, bedside commode and grab bar before coming to the hospital.  It should be noted that when SW asked whether the patient had a PCP, the patient's spouse reported that she has a PCP in [facility].  Patient's spouse reported that approximately one month ago, that the patient was receiving HH services from [facility] but was discharged.  Patient's spouse feels like the patient may need HH services again.  No additional discharge needs were identified at this time.  SW discussed information on advanced directives, information on pharmacy bedside delivery, and discharge planning begins on admission with contact information for any needs/questions.     D/C Plan:  Home  PCP:  No  Preferred Pharmacy:    [facility] - [address] [address]  Phone: [phone] Fax: [phone]    Discharge transportation:  Spouse  My [facility]: Declined  Pharmacy Bedside Delivery:  Declined       04/30/20 0174   Discharge Assessment   Assessment Type Discharge Planning Assessment   Confirmed/corrected address and phone number on facesheet? Yes   Assessment information obtained from? Caregiver   Expected Length of Stay (days)   (TBD)   Communicated expected length of stay with patient/caregiver   (TBD)   Prior to hospitilization cognitive status: Alert/Oriented   Prior to hospitalization functional status: Independent;Assistive Equipment   Current cognitive status: Unable to Assess   Current Functional Status: Independent;Assistive Equipment   Facility Arrived From: Home   Lives With spouse    Able to Return to Prior Arrangements yes   Is patient able to care for self after discharge? Unable to determine at this time (comments)   Who are your caregiver(s) and their phone number(s)? Derek Zuleyka (spouse) [phone] and Flori Correa (daughter) [phone]   Patient's perception of discharge disposition home or selfcare   Readmission Within the Last 30 Days no previous admission in last 30 days   Patient currently being followed by outpatient case management? No   Patient currently receives any other outside agency services? No   Equipment Currently Used at Home walker, rolling;cane, straight;bedside commode   Do you have any problems affording any of your prescribed medications? No   Is the patient taking medications as prescribed? yes   Does the patient have transportation home? No   Dialysis Name and Scheduled days N/A   Does the patient receive services at the [facility]? No   Discharge Plan A Home with family   Discharge Plan B Home with family   DME Needed Upon Discharge  none   Patient/Family in Agreement with Plan yes

## 2020-04-30 NOTE — ED NOTES
Unable to place NG tube at this time. Patient thrashing her body and head back and forth. Dr. Short notified.

## 2020-04-30 NOTE — HPI
"Jossy Siegel is a 59 y.o. female patient with a PMHx of Alcoholic cirrhosis of the liver, Ascites, Hepatitis, Illicit drug use, and Pancytopenia followed outpatient who presented to the Emergency Department today for AMS.  Of note, patient had a recent admission here for the same and was discharged home on 4/23.  Symptoms are constant and moderate in severity.  No mitigating or exacerbating factors reported.  Associated sxs include abdominal distention.  Patient is unable to provide any pertinent negatives at this time given altered mentation.  No prior Tx reported.  No further complaints or concerns at this time.  Per primary nurse, daughter reported patient was "normal last night", and was reportedly scheduled for a paracentesis today.  ER workup showed:  Stable VS.  CBC showed pancytopenia, consistent with prior counts.  CMP showed , Alk phos 184.  Ammonia 108.  .  ETOH <10.  UDS positive for amphetamines.  Covid negative.  UA negative.  CT head showed no acute findings.  CXR negative for acute findings.  Abdominal xray showed Nasogastric tube projects within the stomach with the side hole just beyond the GE junction.  After NGT insertion patient was given Lactulose 30 g in the ER.  Given combative behavior, patient was given Ativan and Zyprexa in the ER and placed in bilateral soft wrist restraints.  [facility].  Patient will be placed in OBS.  She is a Full Code.  Her SDM is her daughter Flori Correa who can be reached at [phone].   "

## 2020-04-30 NOTE — ED NOTES
Attempted to call report to [facility]. Receiving nurse unable to take report at this time. Will call back.

## 2020-04-30 NOTE — ED NOTES
"Updated patient's daughter, Flori. Per daughter, patient lives with her boyfriend who states she was "normal" upon going to bed last night and when the boyfriend woke up this morning he stated he "could tell she wasn't right" and didn't know who he was. Patient's boyfriend then called the ambulance.  "

## 2020-04-30 NOTE — ED NOTES
Notified Dr. Short that patient's daughter states she was supposed to have a paracentesis with Dr. Green today.

## 2020-04-30 NOTE — H&P
"[facility] Hospital Medicine  History & Physical    Patient Name: Jossy Siegel  MRN: [id number]  Admission Date: 4/30/2020  Attending Physician: Berna Núñez MD   Primary Care Provider: Primary Doctor No         Patient information was obtained from past medical records and ER records.     Subjective:     Principal Problem:Hepatic encephalopathy    Chief Complaint:   Chief Complaint   Patient presents with    Altered Mental Status     distended abdomen and confusion        HPI: Jossy Siegel is a 59 y.o. female patient with a PMHx of Alcoholic cirrhosis of the liver, Ascites, Hepatitis, Illicit drug use, and Pancytopenia followed outpatient who presented to the Emergency Department today for AMS.   Of note, patient had a recent admission here for the same and was discharged home on 4/23.  Symptoms are constant and moderate in severity.  No mitigating or exacerbating factors reported.  Associated sxs include abdominal distention.  Patient is unable to provide any pertinent negatives at this time given altered mentation.  No prior Tx reported.  No further complaints or concerns at this time.  Per primary nurse, daughter reported patient was "normal last night", and was reportedly scheduled for a paracentesis today.  ER workup showed:  Stable VS.  CBC showed pancytopenia, consistent with prior counts.  CMP showed , Alk phos 184.  Ammonia 108.  .  ETOH <10.  UDS positive for amphetamines.  Covid negative.  UA negative.  CT head showed no acute findings.  CXR negative for acute findings.  Abdominal xray showed Nasogastric tube projects within the stomach with the side hole just beyond the GE junction.  After NGT insertion patient was given Lactulose 30 g in the ER.  Given combative behavior, patient was given Ativan and Zyprexa in the ER and placed in bilateral soft wrist restraints.  [facility].  Patient will be placed in OBS.  She is a " Full Code.  Her SDM is her daughter Flori Correa who can be reached at [phone].     Past Medical History:   Diagnosis Date    Alcoholic cirrhosis of liver with ascites     Cirrhosis     Hepatitis     Pancytopenia 2/5/2020    Last Assessment & Plan:  History & Physical Chronic.  Stable.  Follow-up repeat CBC and INR in a.m.  Discharge Summary  Chronic.  Stable. Likely secondary to cirrhosis. Follow-up  Chronic.  Stable. Likely secondary to cirrhosis.       Past Surgical History:   Procedure Laterality Date    PARACENTESIS         Review of patient's allergies indicates:  No Known Allergies    No current facility-administered medications on file prior to encounter.      Current Outpatient Medications on File Prior to Encounter   Medication Sig    albuterol (PROVENTIL/VENTOLIN HFA) 90 mcg/actuation inhaler Inhale 2 puffs into the lungs every 6 (six) hours.    amitriptyline (ELAVIL) 25 MG tablet Take 25 mg by mouth every evening.    ciprofloxacin HCl (CIPRO) 500 MG tablet Take 1 tablet (500 mg) by mouth 2 times a week (Sunday and Wednesday)    furosemide (LASIX) 40 MG tablet Take 1 tablet (40 mg total) by mouth 2 (two) times daily.    lactulose 10 gram/15 ml (CHRONULAC) 10 gram/15 mL (15 mL) solution Take 15 mLs (10 g total) by mouth 3 (three) times daily.    metoprolol tartrate (LOPRESSOR) 25 MG tablet Take 1 tablet (25 mg total) by mouth 2 (two) times daily.    ondansetron (ZOFRAN-ODT) 4 MG TbDL Take 1 tablet (4 mg total) by mouth every 8 (eight) hours as needed (nausea).    pantoprazole (PROTONIX) 40 MG tablet Take 40 mg by mouth 2 (two) times daily.    spironolactone (ALDACTONE) 100 MG tablet Take 1 tablet (100 mg total) by mouth 2 (two) times daily.     Family History     None        Tobacco Use    Smoking status: Current Every Day Smoker     Packs/day: 0.25     Years: 45.00     Pack years: 11.25     Types: Cigarettes    Smokeless tobacco: Never Used   Substance and Sexual Activity    Alcohol  use: Not Currently    Drug use: Never    Sexual activity: Not on file     Review of Systems   Unable to perform ROS: Mental status change     Objective:     Vital Signs (Most Recent):  Temp: 97.3 °F (36.3 °C) (04/30/20 1403)  Pulse: 97 (04/30/20 1501)  Resp: 15 (04/30/20 1501)  BP: 133/76 (04/30/20 1501)  SpO2: 100 % (04/30/20 1501) Vital Signs (24h Range):  Temp:  [97 °F (36.1 °C)-97.3 °F (36.3 °C)] 97.3 °F (36.3 °C)  Pulse:  [] 97  Resp:  [12-20] 15  SpO2:  [97 %-100 %] 100 %  BP: ()/(53-86) 133/76     Weight: 60.5 kg (133 lb 4.8 oz)  Body mass index is 24.38 kg/m².    Physical Exam   Constitutional: She appears well-developed and well-nourished. She appears ill.   Thin, CF resting comfortably in bed in NAD.    HENT:   Head: Normocephalic and atraumatic.       Mouth/Throat: Oropharynx is clear and moist.   Eyes: Pupils are equal, round, and reactive to light. Conjunctivae are normal.   Neck: Normal range of motion. Neck supple.   Cardiovascular: Normal rate, regular rhythm, normal heart sounds and intact distal pulses. Exam reveals no gallop and no friction rub.   No murmur heard.  Pulmonary/Chest: Effort normal and breath sounds normal. No respiratory distress. She has no wheezes. She has no rales. She exhibits no tenderness.   Abdominal: Soft. Bowel sounds are normal. She exhibits distension. She exhibits no mass. There is no tenderness.   Genitourinary:   Genitourinary Comments: House cath patent   Musculoskeletal: She exhibits no edema or tenderness.   Bilateral soft wrist restraints.   Neurological: GCS eye subscore is 2. GCS verbal subscore is 2. GCS motor subscore is 5.   Eyes closed on exam.  Patient withdraws to painful stimulus; upon stimulation she begins thrashing head back and forth and moves all extremities.  Does not follow commands.  PERRLA, size 3 bilaterally with brisk response.     Skin: Skin is warm and dry. No rash noted. No erythema.   Psychiatric:   Restless   Nursing note and  vitals reviewed.        CRANIAL NERVES     CN III, IV, VI   Pupils are equal, round, and reactive to light.       Significant Labs:   CBC:   Recent Labs   Lab 04/30/20  1015   WBC 1.87*   HGB 10.1*   HCT 30.7*   PLT 80*     CMP:   Recent Labs   Lab 04/30/20  1015      K 3.5      CO2 23   *   BUN 15   CREATININE 1.0   CALCIUM 8.1*   PROT 6.8   ALBUMIN 2.5*   BILITOT 1.4*   ALKPHOS 184*   AST 50*   ALT 28   ANIONGAP 10   EGFRNONAA >60     Cardiac Markers:   Recent Labs   Lab 04/30/20  1015   *     Coagulation: No results for input(s): PT, INR, APTT in the last 48 hours.  Troponin:   Recent Labs   Lab 04/30/20  1015   TROPONINI <0.006     Urine Studies:   Recent Labs   Lab 04/30/20  0852   COLORU Yellow   APPEARANCEUA Clear   PHUR 7.0   SPECGRAV 1.010   PROTEINUA Negative   GLUCUA Negative   KETONESU Negative   BILIRUBINUA Negative   OCCULTUA Negative   NITRITE Negative   UROBILINOGEN Negative   LEUKOCYTESUR Negative       Significant Imaging: I have reviewed all pertinent imaging results/findings within the past 24 hours.    Assessment/Plan:     * Hepatic encephalopathy  - Place in OBS  - CT head negative for acute process  - Recently discharged from here on 4/23 after being hospitalized for the same (unknown if patient filled prescription for Cipro).  Per MR review, she had not filled her Rifaximin at time of last admission.  - Given Lactulose 30g via NGT in the ER  - Continue Lactulose 15 g TID via NGT  - Neuro checks q 4 hours  - GI consult pending  - Supportive care  - Restraints as needed for combative/agressive behavior  - Avoid sedative medications      Cirrhosis of liver with ascites related to hepatitis C virus  - Followed outpatient by Dr. Green and scheduled for paracentesis as an outpatient today; per MR review patient is not a candidate for liver transplant given ongoing amphetamine use  - GI consult pending  - US guided paracentesis and labs pending  - ETOH <10  - Resume  diuretics if ok with GI  - Daily labs  - Monitor     MELD-Na score: 13 at 4/23/2020  5:29 AM  MELD score: 13 at 4/23/2020  5:29 AM  Calculated from:  Serum Creatinine: 0.9 mg/dL (Rounded to 1 mg/dL) at 4/23/2020  5:29 AM  Serum Sodium: 140 mmol/L (Rounded to 137 mmol/L) at 4/23/2020  5:29 AM  Total Bilirubin: 2.3 mg/dL at 4/23/2020  5:29 AM  INR(ratio): 1.4 at 4/23/2020  5:29 AM  Age: 59 years    Hyperammonemia  - Ammonia 108  - Check daily level  - See plan as per above      Pancytopenia  - Followed outpatient by Dr. Singh  - Hemoc consult pending  - No active s/s of bleeding or infection identified at this time  - Daily CBC      Methamphetamine use  - UDS + for amphetamines  - Will need counseling on cessation once more lucid      Tobacco abuse  - Will need counseling on cessation once more lucid        VTE Risk Mitigation (From admission, onward)         Ordered     Place sequential compression device  Until discontinued      04/30/20 1425     IP VTE LOW RISK PATIENT  Once      04/30/20 1425                   Ariadna Alegre, DNP, ACNP-BC  Department of Hospital Medicine   [facility]

## 2020-04-30 NOTE — SIGNIFICANT EVENT
Case d/w Dr. Aleman given patient underwent a virtual visit with Dr. Hoskins yesterday.  B12 added on to labs per Hemoc request.  Will need to f/u on result.  Per Hemoc, no need for official consult at this time as counts are stable, however, if WBC count worsens they will need to be consulted.  Will DC Hemoc consult for now.

## 2020-04-30 NOTE — ED NOTES
"Secure chat sent to Dr. Aleman: "Inpatient hem/oc consult requested for patient with Pancytopenia known by Dr. Hoskins. Thank you."  "

## 2020-04-30 NOTE — ASSESSMENT & PLAN NOTE
- Followed outpatient by Dr. Singh  - Hemoc consult pending  - No active s/s of bleeding or infection identified at this time  - Daily CBC

## 2020-04-30 NOTE — ASSESSMENT & PLAN NOTE
- Place in OBS  - CT head negative for acute process  - Recently discharged from here on 4/23 after being hospitalized for the same (unknown if patient filled prescription for Cipro).  Per MR review, she had not filled her Rifaximin at time of last admission.  - Given Lactulose 30g via NGT in the ER  - Continue Lactulose 15 g TID via NGT  - Neuro checks q 4 hours  - GI consult pending  - Supportive care  - Restraints as needed for combative/agressive behavior  - Avoid sedative medications

## 2020-04-30 NOTE — ED NOTES
Notified Dr. Short that patient refuses to take oral lactulose and began thrashing her body around the bed.

## 2020-04-30 NOTE — ASSESSMENT & PLAN NOTE
- Followed outpatient by Dr. Green and scheduled for paracentesis as an outpatient today; per MR review patient is not a candidate for liver transplant given ongoing amphetamine use  - GI consult pending  - US guided paracentesis and labs pending  - ETOH <10  - Resume diuretics if ok with GI  - Daily labs  - Monitor     MELD-Na score: 13 at 4/23/2020  5:29 AM  MELD score: 13 at 4/23/2020  5:29 AM  Calculated from:  Serum Creatinine: 0.9 mg/dL (Rounded to 1 mg/dL) at 4/23/2020  5:29 AM  Serum Sodium: 140 mmol/L (Rounded to 137 mmol/L) at 4/23/2020  5:29 AM  Total Bilirubin: 2.3 mg/dL at 4/23/2020  5:29 AM  INR(ratio): 1.4 at 4/23/2020  5:29 AM  Age: 59 years

## 2020-04-30 NOTE — NURSING
Pt. Admitted to floor from ED, x1, in no acute distress. Soft restrains x4, NGT no suction and mejia in place. Will continue to monitor.

## 2020-05-01 ENCOUNTER — TELEPHONE (OUTPATIENT)
Dept: GASTROENTEROLOGY | Facility: CLINIC | Age: 60
End: 2020-05-01

## 2020-05-01 VITALS
HEART RATE: 82 BPM | TEMPERATURE: 99 F | SYSTOLIC BLOOD PRESSURE: 116 MMHG | RESPIRATION RATE: 18 BRPM | OXYGEN SATURATION: 97 % | WEIGHT: 133.31 LBS | DIASTOLIC BLOOD PRESSURE: 56 MMHG | BODY MASS INDEX: 24.38 KG/M2

## 2020-05-01 PROBLEM — K76.82 HEPATIC ENCEPHALOPATHY: Status: RESOLVED | Noted: 2020-04-30 | Resolved: 2020-05-01

## 2020-05-01 LAB
ALBUMIN SERPL BCP-MCNC: 2.5 G/DL (ref 3.5–5.2)
ALP SERPL-CCNC: 159 U/L (ref 55–135)
ALT SERPL W/O P-5'-P-CCNC: 30 U/L (ref 10–44)
AMMONIA PLAS-SCNC: 77 UMOL/L (ref 10–50)
ANION GAP SERPL CALC-SCNC: 9 MMOL/L (ref 8–16)
APPEARANCE FLD: NORMAL
AST SERPL-CCNC: 51 U/L (ref 10–40)
BASOPHILS # BLD AUTO: 0.03 K/UL (ref 0–0.2)
BASOPHILS NFR BLD: 1 % (ref 0–1.9)
BILIRUB SERPL-MCNC: 3 MG/DL (ref 0.1–1)
BODY FLD TYPE: NORMAL
BUN SERPL-MCNC: 16 MG/DL (ref 8–23)
CALCIUM SERPL-MCNC: 8.2 MG/DL (ref 8.7–10.5)
CHLORIDE SERPL-SCNC: 112 MMOL/L (ref 95–110)
CO2 SERPL-SCNC: 23 MMOL/L (ref 23–29)
COLOR FLD: YELLOW
CREAT SERPL-MCNC: 1.1 MG/DL (ref 0.5–1.4)
DIFFERENTIAL METHOD: ABNORMAL
EOSINOPHIL # BLD AUTO: 0.1 K/UL (ref 0–0.5)
EOSINOPHIL NFR BLD: 3.3 % (ref 0–8)
ERYTHROCYTE [DISTWIDTH] IN BLOOD BY AUTOMATED COUNT: 16.5 % (ref 11.5–14.5)
EST. GFR  (AFRICAN AMERICAN): >60 ML/MIN/1.73 M^2
EST. GFR  (NON AFRICAN AMERICAN): 54 ML/MIN/1.73 M^2
GLUCOSE SERPL-MCNC: 99 MG/DL (ref 70–110)
GRAM STN SPEC: NORMAL
GRAM STN SPEC: NORMAL
HCT VFR BLD AUTO: 33.8 % (ref 37–48.5)
HGB BLD-MCNC: 10.9 G/DL (ref 12–16)
IMM GRANULOCYTES # BLD AUTO: 0.02 K/UL (ref 0–0.04)
IMM GRANULOCYTES NFR BLD AUTO: 0.7 % (ref 0–0.5)
INR PPP: 1.3 (ref 0.8–1.2)
LYMPHOCYTES # BLD AUTO: 0.6 K/UL (ref 1–4.8)
LYMPHOCYTES NFR BLD: 21.4 % (ref 18–48)
LYMPHOCYTES NFR FLD MANUAL: 9 %
MAGNESIUM SERPL-MCNC: 2 MG/DL (ref 1.6–2.6)
MCH RBC QN AUTO: 31.4 PG (ref 27–31)
MCHC RBC AUTO-ENTMCNC: 32.2 G/DL (ref 32–36)
MCV RBC AUTO: 97 FL (ref 82–98)
MONOCYTES # BLD AUTO: 0.6 K/UL (ref 0.3–1)
MONOCYTES NFR BLD: 18.4 % (ref 4–15)
MONOS+MACROS NFR FLD MANUAL: 87 %
NEUTROPHILS # BLD AUTO: 1.7 K/UL (ref 1.8–7.7)
NEUTROPHILS NFR BLD: 55.2 % (ref 38–73)
NEUTROPHILS NFR FLD MANUAL: 4 %
NRBC BLD-RTO: 0 /100 WBC
PATH INTERP FLD-IMP: NORMAL
PHOSPHATE SERPL-MCNC: 3.5 MG/DL (ref 2.7–4.5)
PLATELET # BLD AUTO: 99 K/UL (ref 150–350)
PMV BLD AUTO: 11.1 FL (ref 9.2–12.9)
POTASSIUM SERPL-SCNC: 3.3 MMOL/L (ref 3.5–5.1)
PROT SERPL-MCNC: 7 G/DL (ref 6–8.4)
PROTHROMBIN TIME: 13.2 SEC (ref 9–12.5)
RBC # BLD AUTO: 3.47 M/UL (ref 4–5.4)
SODIUM SERPL-SCNC: 144 MMOL/L (ref 136–145)
WBC # BLD AUTO: 2.99 K/UL (ref 3.9–12.7)
WBC # FLD: 74 /CU MM

## 2020-05-01 PROCEDURE — 84157 ASSAY OF PROTEIN OTHER: CPT

## 2020-05-01 PROCEDURE — 80053 COMPREHEN METABOLIC PANEL: CPT

## 2020-05-01 PROCEDURE — 87070 CULTURE OTHR SPECIMN AEROBIC: CPT

## 2020-05-01 PROCEDURE — 89051 BODY FLUID CELL COUNT: CPT

## 2020-05-01 PROCEDURE — 83735 ASSAY OF MAGNESIUM: CPT

## 2020-05-01 PROCEDURE — 36415 COLL VENOUS BLD VENIPUNCTURE: CPT

## 2020-05-01 PROCEDURE — 25000003 PHARM REV CODE 250: Performed by: NURSE PRACTITIONER

## 2020-05-01 PROCEDURE — 85610 PROTHROMBIN TIME: CPT

## 2020-05-01 PROCEDURE — 82140 ASSAY OF AMMONIA: CPT

## 2020-05-01 PROCEDURE — 82042 OTHER SOURCE ALBUMIN QUAN EA: CPT

## 2020-05-01 PROCEDURE — 85025 COMPLETE CBC W/AUTO DIFF WBC: CPT

## 2020-05-01 PROCEDURE — 87075 CULTR BACTERIA EXCEPT BLOOD: CPT

## 2020-05-01 PROCEDURE — 83615 LACTATE (LD) (LDH) ENZYME: CPT

## 2020-05-01 PROCEDURE — 63700000 PHARM REV CODE 250 ALT 637 W/O HCPCS: Performed by: NURSE PRACTITIONER

## 2020-05-01 PROCEDURE — 84100 ASSAY OF PHOSPHORUS: CPT

## 2020-05-01 PROCEDURE — 87205 SMEAR GRAM STAIN: CPT

## 2020-05-01 RX ORDER — POTASSIUM CHLORIDE 20 MEQ/15ML
40 SOLUTION ORAL ONCE
Status: COMPLETED | OUTPATIENT
Start: 2020-05-01 | End: 2020-05-01

## 2020-05-01 RX ORDER — METOPROLOL TARTRATE 25 MG/1
25 TABLET, FILM COATED ORAL 2 TIMES DAILY
Status: DISCONTINUED | OUTPATIENT
Start: 2020-05-01 | End: 2020-05-01 | Stop reason: HOSPADM

## 2020-05-01 RX ORDER — FUROSEMIDE 40 MG/1
40 TABLET ORAL 2 TIMES DAILY
Status: DISCONTINUED | OUTPATIENT
Start: 2020-05-01 | End: 2020-05-01 | Stop reason: HOSPADM

## 2020-05-01 RX ORDER — SPIRONOLACTONE 25 MG/1
100 TABLET ORAL 2 TIMES DAILY
Status: DISCONTINUED | OUTPATIENT
Start: 2020-05-01 | End: 2020-05-01 | Stop reason: HOSPADM

## 2020-05-01 RX ORDER — PANTOPRAZOLE SODIUM 40 MG/1
40 TABLET, DELAYED RELEASE ORAL 2 TIMES DAILY
Status: DISCONTINUED | OUTPATIENT
Start: 2020-05-01 | End: 2020-05-01 | Stop reason: HOSPADM

## 2020-05-01 RX ADMIN — METOPROLOL TARTRATE 25 MG: 25 TABLET ORAL at 10:05

## 2020-05-01 RX ADMIN — POTASSIUM CHLORIDE 40 MEQ: 20 SOLUTION ORAL at 11:05

## 2020-05-01 RX ADMIN — SPIRONOLACTONE 100 MG: 25 TABLET ORAL at 11:05

## 2020-05-01 RX ADMIN — FUROSEMIDE 40 MG: 40 TABLET ORAL at 11:05

## 2020-05-01 RX ADMIN — PANTOPRAZOLE SODIUM 40 MG: 40 TABLET, DELAYED RELEASE ORAL at 11:05

## 2020-05-01 RX ADMIN — LACTULOSE 15 G: 20 SOLUTION ORAL at 09:05

## 2020-05-01 NOTE — DISCHARGE SUMMARY
"[facility] Hospital Medicine  Discharge Summary      Patient Name: Jossy Siegel  MRN: [id number]  Admission Date: 4/30/2020  Hospital Length of Stay: 1 days  Discharge Date and Time:  05/01/2020 10:45 AM  Attending Physician: Berna Núñez MD   Discharging Provider: Ariadna Alegre NP  Primary Care Provider: Soumya Jean      HPI:   Jossy Siegel is a 59 y.o. female patient with a PMHx of Alcoholic cirrhosis of the liver, Ascites, Hepatitis, Illicit drug use, and Pancytopenia followed outpatient who presented to the Emergency Department today for AMS.   Of note, patient had a recent admission here for the same and was discharged home on 4/23.  Symptoms are constant and moderate in severity.  No mitigating or exacerbating factors reported.  Associated sxs include abdominal distention.  Patient is unable to provide any pertinent negatives at this time given altered mentation.  No prior Tx reported.  No further complaints or concerns at this time.  Per primary nurse, daughter reported patient was "normal last night", and was reportedly scheduled for a paracentesis today.  ER workup showed:  Stable VS.  CBC showed pancytopenia, consistent with prior counts.  CMP showed , Alk phos 184.  Ammonia 108.  .  ETOH <10.  UDS positive for amphetamines.  Covid negative.  UA negative.  CT head showed no acute findings.  CXR negative for acute findings.  Abdominal xray showed Nasogastric tube projects within the stomach with the side hole just beyond the GE junction.  After NGT insertion patient was given Lactulose 30 g in the ER.  Given combative behavior, patient was given Ativan and Zyprexa in the ER and placed in bilateral soft wrist restraints.  Hospital [facility] called for admission.  Patient will be placed in OBS.  She is a Full Code.  Her SDM is her daughter Flori Correa who can be reached at [phone].     * No surgery found *      Hospital Course:   On 4/30 " "patient was placed in OBS secondary to Hepatic Encephalopathy.  Patient combative with AMS in the ER requiring soft wrist restraints and insertion of an NGT for administration of Lactulose.  CT head negative for acute process.  Paracentesis ordered for ascites.  Case d/w Dr. Jo with GI who evaluated the patient on her last admission (just discharged home on 4/23).  Per GI, no need to re-consult as recommendations will stay the same.    As of 5/1 Patient is AAOx3, conversing appropriately and answering all questions appropriately.  Ammonia improved to 77, down from 108.  Patient honest, and readily admitted to use of amphetamines and not taking her home medications as prescribed stating "most times I just forget".  Patient gave permission for me to speak with her daughter and give full disclosure on her recreational activities.  Per daughter Flori, her mom has had ongoing issues with illicit drugs "all her life".  Patient was very receptive to discussing the need for complete drug cessation, expressing a desire to quit.  She is fully aware of the consequences should she continue her current behaviors.  Patient would like to DC home with her daughter as her "boyfriend uses with me".  Flori is more than willing to take care of her mom, and is aware of the need for strict medication compliance.  VS remain stable.  Home Metoprolol and diuretics resumed today. Patient underwent a paracentesis with removal of 1.5L.  Abdomen soft with improvement in distention post paracentesis.  Remains afebrile.  CBC counts stable and consistent with baseline; will need to keep f/u with Dr. Hoskins as directed for monitoring.    Patient will be discharged to continue Cipro BID x 2 weeks per prior GI recs.  She was also instructed to follow a cardiac, low Na diet, and to take all medications as prescribed, verbalzied + understanding.  House cath and NGT removed.  Patient tolerating diet.  DTV prior to DC.  Case d/w Dr. Núñez, " patient seen and examined and deemed medically stable to DC home later today.  She was instructed to f/u with Dr. Green for monitoring and to establish care with either the [facility], or [facility] for primary care.       Consults: None.    No new Assessment & Plan notes have been filed under this hospital service since the last note was generated.  Service: Hospital Medicine    Final Active Diagnoses:    Diagnosis Date Noted POA    Cirrhosis of liver with ascites related to hepatitis C virus [K74.60, R18.8] 02/17/2020 Yes     Chronic    Hyperammonemia [E72.20] 04/30/2020 Yes    Pancytopenia [D61.818] 02/05/2020 Yes     Chronic    Methamphetamine use [F15.10] 04/22/2020 Yes    Tobacco abuse [Z72.0] 04/22/2020 Yes      Problems Resolved During this Admission:    Diagnosis Date Noted Date Resolved POA    PRINCIPAL PROBLEM:  Hepatic encephalopathy [K72.90] 04/30/2020 05/01/2020 Yes       Discharged Condition: good    Disposition: Home or Self Care    Follow Up:  Follow-up Information     Alma Delia Green MD In 1 week.    Specialties:  Gastroenterology, Hepatology  Why:  Follow up with GI for post hospital follow up and monitoring.   Contact information:  [address] [place] [address] [phone]             Please follow up.    Why:  Follow up with either [facility], or [facility] to establish care with a PCP upon DC.           Hardy Hoskins MD.    Specialty:  Hematology and Oncology  Why:  Keep scheduled appointments with [facility] as directed.   Contact information:  [address]  [place] [address] [phone]                 Patient Instructions:      Diet Cardiac   Order Comments: Limit sodium intake to no more than 2g per day.     Notify your health care provider if you experience any of the following:  temperature >100.4     Notify your health care provider if you experience any of the following:  severe uncontrolled pain     Notify your health care provider if you experience any of  the following:  difficulty breathing or increased cough     Notify your health care provider if you experience any of the following:  severe persistent headache     Notify your health care provider if you experience any of the following:  persistent dizziness, light-headedness, or visual disturbances     Notify your health care provider if you experience any of the following:  increased confusion or weakness     Activity as tolerated       Significant Diagnostic Studies: Labs:   CMP   Recent Labs   Lab 04/30/20  1015 05/01/20  0518    144   K 3.5 3.3*    112*   CO2 23 23   * 99   BUN 15 16   CREATININE 1.0 1.1   CALCIUM 8.1* 8.2*   PROT 6.8 7.0   ALBUMIN 2.5* 2.5*   BILITOT 1.4* 3.0*   ALKPHOS 184* 159*   AST 50* 51*   ALT 28 30   ANIONGAP 10 9   ESTGFRAFRICA >60 >60   EGFRNONAA >60 54*   , CBC   Recent Labs   Lab 04/30/20  1015 05/01/20  0518   WBC 1.87* 2.99*   HGB 10.1* 10.9*   HCT 30.7* 33.8*   PLT 80* 99*    and INR   Lab Results   Component Value Date    INR 1.3 (H) 05/01/2020    INR 1.4 (H) 04/23/2020    INR 1.4 (H) 04/22/2020     Imaging Results          US Guided Paracentesis (In process)                X-Ray Abdomen AP 1 View (KUB) (Final result)  Result time 04/30/20 13:38:06    Final result by Erick Hou MD (04/30/20 13:38:06)                 Impression:      Nasogastric tube projects within the stomach with the side hole just beyond the GE junction.      Electronically signed by: Erick Hou MD  Date:    04/30/2020  Time:    13:38             Narrative:    EXAMINATION:  XR ABDOMEN AP 1 VIEW    CLINICAL HISTORY:  Encounter for fitting and adjustment of other gastrointestinal appliance and device    TECHNIQUE:  Single view of the abdomen was performed.    COMPARISON:  None    FINDINGS:  Nasogastric tube projects within the stomach with the side hole just beyond the GE junction.  Nonobstructive bowel gas pattern.  Mild constipation.    No obvious free air.  No portal venous  gas.    No acute fracture. Mild DJD. Lung are grossly clear.                               CT Head Without Contrast (Final result)  Result time 04/30/20 11:25:58    Final result by Erick Hou MD (04/30/20 11:25:58)                 Impression:      No acute findings.    All CT scans at this facility use dose modulation, iterative reconstruction, and/or weight based dosing when appropriate to reduce radiation dose to as low as reasonable achievable.      Electronically signed by: Erick Hou MD  Date:    04/30/2020  Time:    11:25             Narrative:    EXAMINATION:  CT HEAD WITHOUT CONTRAST    CLINICAL HISTORY:  Confusion/delirium, altered LOC, unexplained;    TECHNIQUE:  Low dose axial CT images obtained throughout the head without intravenous contrast. Sagittal and coronal reconstructions were performed.    COMPARISON:  04/22/2020    FINDINGS:  Intracranial compartment:    The brain parenchyma demonstrates areas of decreased attenuation with mild to moderate periventricular white matter consistent with chronic microvascular ischemic changes..  No parenchymal mass, hemorrhage, edema or major vascular distribution infarct.  Vascular calcifications are noted.    Mild prominence of the sulci and ventricles are consistent with age-related involutional changes.    No extra-axial blood or fluid collections.    Skull/extracranial contents (limited evaluation): No fracture.  Trace fluid is seen within the right mastoid air cells.  No fluid levels.  Mastoid air cells and paranasal sinuses are essentially clear.                               X-Ray Chest AP Portable (Final result)  Result time 04/30/20 09:55:42    Final result by Kory Valles Jr., MD (04/30/20 09:55:42)                 Impression:      No acute findings.      Electronically signed by: Kory Valles Jr., MD  Date:    04/30/2020  Time:    09:55             Narrative:    EXAMINATION:  XR CHEST AP PORTABLE    CLINICAL  HISTORY:  ams;    COMPARISON:  Prior radiograph from April 22, 2020.    FINDINGS:  Interval removal nasogastric tube.  Bandlike atelectasis or scarring within the periphery of the left lower lobe is unchanged.  The remaining lungs are clear.  No pleural fluid or pneumothorax.  Heart size within normal limits.  No significant bony findings.                                  Pending Diagnostic Studies:     Procedure Component Value Units Date/Time    Albumin, Peritoneal, Pleural Fluid or MIKEL Drainage, In-House Ascites [[id number]] Collected:  05/01/20 1020    Order Status:  Sent Lab Status:  In process Updated:  05/01/20 1040    Specimen:  Ascites     LDH, Peritoneal, Pleural Fluid or MIKEL Drainage, In-House Ascites [[id number]] Collected:  05/01/20 1020    Order Status:  Sent Lab Status:  In process Updated:  05/01/20 1038    Specimen:  Ascites     Protein, Peritoneal, Pleural Fluid or MIKEL Drainage, In-House Ascites [[id number]] Collected:  05/01/20 1020    Order Status:  Sent Lab Status:  In process Updated:  05/01/20 1041    Specimen:  Ascites     US Guided Paracentesis [[id number]] Resulted:  05/01/20 1023    Order Status:  Sent Lab Status:  In process Updated:  05/01/20 1023    WBC & Diff,Body Fluid Ascites [[id number]] Collected:  05/01/20 1020    Order Status:  Sent Lab Status:  In process Updated:  05/01/20 1042    Specimen:  Ascites          Medications:  Reconciled Home Medications:      Medication List      CONTINUE taking these medications    albuterol 90 mcg/actuation inhaler  Commonly known as:  PROVENTIL/VENTOLIN HFA  Inhale 2 puffs into the lungs every 6 (six) hours.     amitriptyline 25 MG tablet  Commonly known as:  ELAVIL  Take 25 mg by mouth every evening.     ciprofloxacin HCl 500 MG tablet  Commonly known as:  CIPRO  Take 1 tablet (500 mg) by mouth 2 times a week (Sunday and Wednesday)     furosemide 40 MG tablet  Commonly known as:  LASIX  Take 1 tablet (40 mg total) by mouth 2 (two) times daily.      lactulose 10 gram/15 ml 10 gram/15 mL (15 mL) solution  Commonly known as:  CHRONULAC  Take 15 mLs (10 g total) by mouth 3 (three) times daily.     metoprolol tartrate 25 MG tablet  Commonly known as:  LOPRESSOR  Take 1 tablet (25 mg total) by mouth 2 (two) times daily.     ondansetron 4 MG Tbdl  Commonly known as:  ZOFRAN-ODT  Take 1 tablet (4 mg total) by mouth every 8 (eight) hours as needed (nausea).     pantoprazole 40 MG tablet  Commonly known as:  PROTONIX  Take 40 mg by mouth 2 (two) times daily.     spironolactone 100 MG tablet  Commonly known as:  ALDACTONE  Take 1 tablet (100 mg total) by mouth 2 (two) times daily.            Indwelling Lines/Drains at time of discharge:   Lines/Drains/Airways     Drain                 Urethral Catheter 04/30/20 0845 Latex 16 Fr. 1 day         NG/OG Tube 04/30/20 1257 nasogastric 16 Fr. Right nostril less than 1 day                Time spent on the discharge of patient: 45 minutes  Patient was seen and examined on the date of discharge and determined to be suitable for discharge.         Ariadna Alegre, TIMA, ACNP-BC  Department of Hospital Medicine  [facility]

## 2020-05-01 NOTE — NURSING
Secure chat sent hospital medicine in regards to clarification on the STAT paracentesis order. Per Pao, NP wont complete paracentesis tonight because pts PLTs are low will wait until AM.

## 2020-05-01 NOTE — OP NOTE
Pre Op Diagnosis: ascites     Post Op Diagnosis: same     Procedure:  para     Procedure performed by: Ameya MONTENEGRO, Herbert ONTIVEROS     Written Informed Consent Obtained: Yes     Specimen Removed:  yes     Estimated Blood Loss:  minimal     Findings: Local anesthesia.     The patient tolerated the procedure well and there were no complications.      Sterile technique was performed in the RLQ, lidocaine was used as a local anesthetic.   1.5 liters of light colette fluid removed for therapeutic and diagnostic purposes.  Pt tolerated the procedure well without immediate complications.  Please see radiologist report for details. F/u with PCP and/or ordering physician.

## 2020-05-01 NOTE — HOSPITAL COURSE
"On 4/30 patient was placed in OBS secondary to Hepatic Encephalopathy.  Patient combative with AMS in the ER requiring soft wrist restraints and insertion of an NGT for administration of Lactulose.  CT head negative for acute process.  Paracentesis ordered for ascites.  Case d/w Dr. Jo with GI who evaluated the patient on her last admission (just discharged home on 4/23).  Per GI, no need to re-consult as recommendations will stay the same.    As of 5/1 Patient is AAOx3, conversing appropriately and answering all questions appropriately.  Ammonia improved to 77, down from 108.  Patient honest, and readily admitted to use of amphetamines and not taking her home medications as prescribed stating "most times I just forget".  Patient gave permission for me to speak with her daughter and give full disclosure on her recreational activities.  Per daughter Flori, her mom has had ongoing issues with illicit drugs "all her life".  Patient was very receptive to discussing the need for complete drug cessation, expressing a desire to quit.  She is fully aware of the consequences should she continue her current behaviors.  Patient would like to DC home with her daughter as her "boyfriend uses with me".  Flori is more than willing to take care of her mom, and is aware of the need for strict medication compliance.  VS remain stable.  Home Metoprolol and diuretics resumed today. Patient underwent a paracentesis with removal of 1.5L.  Abdomen soft with improvement in distention post paracentesis.  Remains afebrile.  CBC counts stable and consistent with baseline; will need to keep f/u with Dr. Hoskins as directed for monitoring.    Patient will be discharged to continue Cipro BID x 2 weeks per prior GI recs.  She was also instructed to follow a cardiac, low Na diet, and to take all medications as prescribed, verbalzied + understanding.  House cath and NGT removed.  Patient tolerating diet.  DTV prior to DC.  Case d/w  " [place], patient seen and examined and deemed medically stable to DC home later today.  She was instructed to f/u with Dr. Green for monitoring and to establish care with either the [facility], or [facility] for primary care.

## 2020-05-01 NOTE — INTERVAL H&P NOTE
The patient has been examined and the H&P has been reviewed:    I concur with the findings and no changes have occurred since H&P was written.        Active Hospital Problems    Diagnosis  POA    *Hepatic encephalopathy [K72.90]  Yes    Hyperammonemia [E72.20]  Yes    Tobacco abuse [Z72.0]  Yes     Last Assessment & Plan:   History & Physical Counseled on continued efforts at cessation.  Reports no longer using nicotine patch but can resume if needed/if desired    Discharge Summary   Counseled on continued efforts at cessation.    Follow-up  Counseled on continued efforts at cessation.      Methamphetamine use [F15.10]  Yes    Cirrhosis of liver with ascites related to hepatitis C virus [K74.60, R18.8]  Yes     Chronic    Pancytopenia [D61.818]  Yes     Chronic      Resolved Hospital Problems   No resolved problems to display.

## 2020-05-01 NOTE — PLAN OF CARE
Discharge instructions discussed with patient and family. Understanding verbalized. IV discontinued, Tele monitor removed, patient getting dressed. To be wheeled downstairs when family arrives

## 2020-05-01 NOTE — SIGNIFICANT EVENT
Spoke with Dr. Jo, updated on patient's admission.  Patient is known to Dr. Jo having just seen her on her last admission (discharged home on 4/23 with the same hospital course).  MD reviewed chart and felt no need for GI to be re- consulted given that recommendations will not change.  Will cancel consult, but GI is available should hospital course change.

## 2020-05-01 NOTE — PLAN OF CARE
Fall precautions implemented. Pt remained free from falls/injuries. Soft restraints in place d/t pt pulling at lines. Pt easy to arouse and becomes disoriented to situation and place at times. Generalized bruising noted to BUE. Awaiting bowel movement. House care performed. NG tube to R nostril intact and clamped. NPO status maintained. NSR on monitor. Tmax 100 decreased to 98.7 after ice packs applied. Safety precautions implemented. Chart reviewed. Will continue to monitor.

## 2020-05-01 NOTE — TELEPHONE ENCOUNTER
called about patient being hospitalized and medication changes that have been made. Explained to patient that he needed to discuss with nurses at hospital regarding medication changes/concerns that he has. Patient is hospitalized at [facility], explained that we do have GI MD on call at hospital and nursing staff can discuss his  Concerns with our on-call physician. Verbalized understanding

## 2020-05-02 LAB
ALBUMIN FLD-MCNC: <0.3 G/DL
BODY FLUID SOURCE, LDH: NORMAL
LDH FLD L TO P-CCNC: 37 U/L
PROT FLD-MCNC: <1 G/DL
SPECIMEN SOURCE: NORMAL
SPECIMEN SOURCE: NORMAL

## 2020-05-04 ENCOUNTER — PATIENT OUTREACH (OUTPATIENT)
Dept: ADMINISTRATIVE | Facility: CLINIC | Age: 60
End: 2020-05-04

## 2020-05-04 DIAGNOSIS — K76.82 HEPATIC ENCEPHALOPATHY: Primary | ICD-10-CM

## 2020-05-04 DIAGNOSIS — Z98.890 S/P ABDOMINAL PARACENTESIS: ICD-10-CM

## 2020-05-04 NOTE — PATIENT INSTRUCTIONS
Cirrhosis    The liver is found on the right side of your belly (abdomen). It is just below the rib cage. The liver has many important jobs. It removes toxins from the blood. It also helps your blood clot to stop bleeding. Cirrhosis happens when the liver is scarred or injured. This damage is permanent. It can cause your liver to stop working (liver failure).   The two most common causes of cirrhosis are long-term heavy alcohol use and having hepatitis B or C. Other things that can damage the liver include toxins, certain medicines, and certain viruses.  Common symptoms of cirrhosis include:  · Tiredness or weakness  · Loss of appetite  · Nausea and vomiting  · Easy bleeding and bruising  · Swelling of the belly (abdomen)  · Weight loss  · Yellowing of the eyes or skin (jaundice)  · Itching  · Confusion  Treatment helps ease symptoms and prevent more liver damage. You may also get treatment to fight the hepatitis virus. Quitting alcohol will help slow down the disease getting worse. It may also prevent more complications. If cirrhosis gets worse and becomes life threatening, you may need a liver transplant.   Home care  · Don't take medicines that can make liver damage worse. Your healthcare provider will tell you if any of the medicines you now take need to be changed. Talk with your provider before taking any medicine not prescribed. These include dietary supplements and herbs. Some of these may make liver damage worse.  · Talk with your healthcare provider about medicines that have acetaminophen or NSAIDs such as ibuprofen and naproxen. These can also harm your liver.   · Stop drinking alcohol. If you find it hard to stop drinking, seek professional help. Consider joining Alcoholics Anonymous or another type of treatment program for support.  · If you use IV drugs, you are at high risk for hepatitis B and C. Seek help to stop.   · Be sure to ask your healthcare provider about recommended vaccines. These include  vaccines for viruses that can cause liver disease.  Follow-up care  Follow up with your healthcare provider or as advised by our staff.  For more information and to learn about support groups for people with liver disease, contact:  · [employer], www.liverfoundation.org, 619.398.5047  · [employer], www.hepfi.org, 674.785.2680  When to seek medical advice  Call your healthcare provider right away if you have any of the following:  · Rapid weight gain with increased size of your belly (abdomen) or leg swelling  · Yellow color of your skin or eyes (jaundice) gets worse  · Excess bleeding from cuts or injuries  Date Last Reviewed: 6/22/2015  © 6315-8896 [employer]. [address] [place] [address], [place] [address]. All rights reserved. This information is not intended as a substitute for professional medical care. Always follow your healthcare professional's instructions.

## 2020-05-04 NOTE — PHYSICIAN QUERY
"PT Name: Jossy Siegel  MR #: [id number]     ACUITY OF CONDITION CLARIFICATION      CDS/: Ada Garcia RN, CDS               Contact information: [email or web address]    This form is a permanent document in the medical record.     Query Date: May 4, 2020    By submitting this query, we are merely seeking further clarification of documentation to reflect the severity of illness of your patient. Please utilize your independent clinical judgment when addressing the question(s) below.    The Medical record reflects the following:     Indicators   Supporting Clinical Findings Location in Medical Record   x Documentation of condition Active Hospital Problems  Hepatic encephalopathy       H&P 4/30   x Lab Value(s) Ammonia 108   H&P 4/30   x Radiology Findings CT head showed no acute findings.   H&P 4/30    x Treatment/Medication - Given Lactulose 30g via NGT in the ER  - Continue Lactulose 15 g TID via NGT  - Neuro checks q 4 hours    Per MR review, she had not filled her Rifaximin at time of last admission.   H&P 4/30         H&P 4/30    x Other 59 y.o. female patient with a PMHx of alcoholic cirrhosis of the liver, ascites, hepatitis who presents to the Emergency Department for AMS    Per primary nurse, daughter reported patient was "normal last night", and was reportedly scheduled for a paracentesis today    As of 5/1 Patient is AAOx3, conversing appropriately and answering all questions appropriately.  Ammonia improved to 77, down from 108.  Patient honest, and readily admitted to use of amphetamines and not taking her home medications as prescribed stating "most times I just forget".    ED Provider Note 4/30         H&P 4/30         DC Summary 5/1      Provider, please specify the acuity/chronicity of __Hepatic Encephalopathy___:    [xx ] Acute   [   ] Acute on chronic   [   ] Other, please specify ______________   [   ] Clinically Undetermined         Present on admission (POA) status:   [   ] " Yes (Y)                           [   ] Clinically Undetermined (W)  [   ] No (N)                            [   ] Documentation insufficient to determine if condition is POA (U)       Please document in your progress notes daily for the duration of treatment until resolved, and include in your discharge summary.

## 2020-05-04 NOTE — PLAN OF CARE
05/04/20 0835   Final Note   Assessment Type Final Discharge Note   Anticipated Discharge Disposition Home   Right Care Referral Info   Post Acute Recommendation No Care

## 2020-05-05 ENCOUNTER — TELEPHONE (OUTPATIENT)
Dept: GASTROENTEROLOGY | Facility: CLINIC | Age: 60
End: 2020-05-05

## 2020-05-05 ENCOUNTER — PATIENT MESSAGE (OUTPATIENT)
Dept: GASTROENTEROLOGY | Facility: CLINIC | Age: 60
End: 2020-05-05

## 2020-05-05 LAB — BACTERIA SPEC AEROBE CULT: NO GROWTH

## 2020-05-05 NOTE — TELEPHONE ENCOUNTER
A portal message was also sent by patient daughter and a staff message has been sent to Dr. Alma Delia Green for further direction regarding patient care.    Pending response from provider.

## 2020-05-05 NOTE — TELEPHONE ENCOUNTER
----- Message from Leigh Ann Benito sent at 5/5/2020  8:00 AM CDT -----  Contact: daughter  States her mother was released from the hospital in her care. States she has some questions. Please call Flori Correa [phone]. Thank you

## 2020-05-06 ENCOUNTER — TELEPHONE (OUTPATIENT)
Dept: TRANSPLANT | Facility: CLINIC | Age: 60
End: 2020-05-06

## 2020-05-06 ENCOUNTER — TELEPHONE (OUTPATIENT)
Dept: GASTROENTEROLOGY | Facility: CLINIC | Age: 60
End: 2020-05-06

## 2020-05-06 NOTE — TELEPHONE ENCOUNTER
Attempted to contact patient x 2 with no answer. Left voicemail message for patient to return call.    Spoke with patient boyfriend who states that he is not home at this time.

## 2020-05-06 NOTE — TELEPHONE ENCOUNTER
Per Dr. Green's recommendation, due to recent positive toxicology screen, will hold off on initiating transplant evaluation.    ----- Message from Alma Delia Green MD sent at 5/6/2020  4:54 PM CDT -----  No lets hold.    ----- Message -----  From: Margaret Garcia RN  Sent: 5/4/2020  11:39 AM CDT  To: Navarro Piña RN, Deja Chavez, #    Hey Dr. Green,    Ok, so open up a transplant episode?  Start w/ psychosocial (SW and psych) since tox screen positive??     Thanks  Geno  ----- Message -----  From: Alma Delia Green MD  Sent: 4/30/2020   2:52 PM CDT  To: Navarro Piña RN, Deja Chavez, #    Ok. Yes I had sent yet, sorry. Plan was to send.    ----- Message -----  From: Deja Chavez  Sent: 4/27/2020   4:14 PM CDT  To: Navarro Piña RN, Margaret Garcia, MAEGAN, #    Dr Green, she is not followed by us.  We don't have a referral or episode on her.  Please advise.   ----- Message -----  From: Navarro iPña RN  Sent: 4/27/2020   4:07 PM CDT  To: Margaret Garcia RN, [facility] Referral [facility]    I don't see any open episodes on this patient, but considering the message below, it doesn't sound like she's clear for eval from a tox screen standpoint.  ----- Message -----  From: Alma Delia Green MD  Sent: 4/27/2020   2:21 PM CDT  To: RAUL Viera, Jessica Jo MD, #    Thank you, disappointing to hear about the meth positivity.  Will have to discuss that when she comes into clinic.  Will make sure transplant team is aware as this will likely delay her transplant evaluation.    ----- Message -----  From: Jessica Jo MD  Sent: 4/23/2020   8:48 AM CDT  To: Alma Delia Green MD, RAUL Viera, #    Patient admitted for hepatic encephalopathy and UDS positive for meth. Seen earlier this month by Dr. Green. Patient will need hospital follow up in 2-3 weeks and also needs variceal screening. See consult note for details.    Thanks  AN

## 2020-05-07 NOTE — TELEPHONE ENCOUNTER
Conference encounter opened in error. Please disregard. Patient does not require review at Liver Conference.

## 2020-05-08 ENCOUNTER — TELEPHONE (OUTPATIENT)
Dept: RADIOLOGY | Facility: HOSPITAL | Age: 60
End: 2020-05-08

## 2020-05-11 ENCOUNTER — OFFICE VISIT (OUTPATIENT)
Dept: GASTROENTEROLOGY | Facility: CLINIC | Age: 60
DRG: 441 | End: 2020-05-11
Payer: MEDICAID

## 2020-05-11 VITALS
HEIGHT: 62 IN | BODY MASS INDEX: 24.54 KG/M2 | DIASTOLIC BLOOD PRESSURE: 72 MMHG | HEART RATE: 88 BPM | SYSTOLIC BLOOD PRESSURE: 120 MMHG | WEIGHT: 133.38 LBS

## 2020-05-11 DIAGNOSIS — R18.8 OTHER ASCITES: ICD-10-CM

## 2020-05-11 DIAGNOSIS — K74.69 DECOMPENSATED HCV CIRRHOSIS: ICD-10-CM

## 2020-05-11 DIAGNOSIS — B19.20 DECOMPENSATED HCV CIRRHOSIS: ICD-10-CM

## 2020-05-11 DIAGNOSIS — F15.10 METHAMPHETAMINE USE: Primary | ICD-10-CM

## 2020-05-11 DIAGNOSIS — K76.82 HEPATIC ENCEPHALOPATHY: ICD-10-CM

## 2020-05-11 DIAGNOSIS — R11.0 NAUSEA: ICD-10-CM

## 2020-05-11 LAB — BACTERIA SPEC ANAEROBE CULT: NORMAL

## 2020-05-11 PROCEDURE — 99214 OFFICE O/P EST MOD 30 MIN: CPT | Mod: S$PBB,,, | Performed by: INTERNAL MEDICINE

## 2020-05-11 PROCEDURE — 99214 PR OFFICE/OUTPT VISIT, EST, LEVL IV, 30-39 MIN: ICD-10-PCS | Mod: S$PBB,,, | Performed by: INTERNAL MEDICINE

## 2020-05-11 PROCEDURE — 99999 PR PBB SHADOW E&M-EST. PATIENT-LVL III: CPT | Mod: PBBFAC,,, | Performed by: INTERNAL MEDICINE

## 2020-05-11 PROCEDURE — 99999 PR PBB SHADOW E&M-EST. PATIENT-LVL III: ICD-10-PCS | Mod: PBBFAC,,, | Performed by: INTERNAL MEDICINE

## 2020-05-11 PROCEDURE — 99213 OFFICE O/P EST LOW 20 MIN: CPT | Mod: PBBFAC | Performed by: INTERNAL MEDICINE

## 2020-05-11 RX ORDER — ONDANSETRON 4 MG/1
TABLET, FILM COATED ORAL
COMMUNITY
Start: 2019-12-30 | End: 2020-12-29

## 2020-05-11 RX ORDER — CIPROFLOXACIN 500 MG/1
500 TABLET ORAL WEEKLY
Qty: 30 TABLET | Refills: 0
Start: 2020-05-11

## 2020-05-11 RX ORDER — CYCLOBENZAPRINE HCL 10 MG
TABLET ORAL
COMMUNITY
Start: 2020-02-07 | End: 2021-02-06

## 2020-05-11 RX ORDER — ONDANSETRON 4 MG/1
4 TABLET, ORALLY DISINTEGRATING ORAL EVERY 8 HOURS PRN
Qty: 30 TABLET | Refills: 1 | Status: SHIPPED | OUTPATIENT
Start: 2020-05-11

## 2020-05-11 NOTE — PROGRESS NOTES
Subjective:     Jossy Siegel is here for follow up of cirrhosis.    HPI  Since Jossy Siegel's last visit the main issues have been:  Since last visit she was admitted to the hospital twice for encephalopathy and urine toxicology was also positive for methamphetamines.  She reports she was using those because of significant low back pain.  She reports she has severe pain at night.  She does not have a primary care doctor but a consult was placed to try get her plugged in with primary care but she has not seen 1 yet.    Ascites-continues with ascites requiring paracentesis just about every week she believes her urination has increased recently she is compliant with her diuretics she reports  Encephalopathy-no confusion now but was present at previous hospitalizations, continuing on lactulose  GI bleeding-none    Review of Systems   Constitutional: Positive for activity change, appetite change and fatigue.   HENT: Negative.    Eyes: Negative.    Respiratory: Negative.    Cardiovascular: Negative.    Gastrointestinal: Positive for abdominal distention and nausea.   Genitourinary: Negative.    Musculoskeletal: Positive for back pain.   Skin: Negative.    Neurological: Negative.    Psychiatric/Behavioral: Positive for decreased concentration and sleep disturbance.       Objective:     Physical Exam   Constitutional: She is oriented to person, place, and time. No distress.   Thin with muscle wasting   HENT:   Head: Normocephalic and atraumatic.   Mouth/Throat: Oropharynx is clear and moist. No oropharyngeal exudate.   Eyes: Pupils are equal, round, and reactive to light. Conjunctivae are normal. Right eye exhibits no discharge. Left eye exhibits no discharge. No scleral icterus.   Pulmonary/Chest: Effort normal and breath sounds normal. No respiratory distress. She has no wheezes.   Abdominal: Soft. She exhibits distension (ascites, nontense). There is no tenderness.   Musculoskeletal: She  exhibits no edema.   Neurological: She is alert and oriented to person, place, and time.   Psychiatric: She has a normal mood and affect. Her behavior is normal.   Vitals reviewed.      MELD-Na score: 14 at 5/1/2020  5:18 AM  MELD score: 14 at 5/1/2020  5:18 AM  Calculated from:  Serum Creatinine: 1.1 mg/dL at 5/1/2020  5:18 AM  Serum Sodium: 144 mmol/L (Rounded to 137 mmol/L) at 5/1/2020  5:18 AM  Total Bilirubin: 3.0 mg/dL at 5/1/2020  5:18 AM  INR(ratio): 1.3 at 5/1/2020  5:18 AM  Age: 59 years    WBC   Date Value Ref Range Status   05/01/2020 2.99 (L) 3.90 - 12.70 K/uL Final     Hemoglobin   Date Value Ref Range Status   05/01/2020 10.9 (L) 12.0 - 16.0 g/dL Final     Hematocrit   Date Value Ref Range Status   05/01/2020 33.8 (L) 37.0 - 48.5 % Final     Platelets   Date Value Ref Range Status   05/01/2020 99 (L) 150 - 350 K/uL Final     BUN, Bld   Date Value Ref Range Status   05/01/2020 16 8 - 23 mg/dL Final     Creatinine   Date Value Ref Range Status   05/01/2020 1.1 0.5 - 1.4 mg/dL Final     Glucose   Date Value Ref Range Status   05/01/2020 99 70 - 110 mg/dL Final     Calcium   Date Value Ref Range Status   05/01/2020 8.2 (L) 8.7 - 10.5 mg/dL Final     Sodium   Date Value Ref Range Status   05/01/2020 144 136 - 145 mmol/L Final     Potassium   Date Value Ref Range Status   05/01/2020 3.3 (L) 3.5 - 5.1 mmol/L Final     Chloride   Date Value Ref Range Status   05/01/2020 112 (H) 95 - 110 mmol/L Final     Magnesium   Date Value Ref Range Status   05/01/2020 2.0 1.6 - 2.6 mg/dL Final     AST   Date Value Ref Range Status   05/01/2020 51 (H) 10 - 40 U/L Final     ALT   Date Value Ref Range Status   05/01/2020 30 10 - 44 U/L Final     Alkaline Phosphatase   Date Value Ref Range Status   05/01/2020 159 (H) 55 - 135 U/L Final     Total Bilirubin   Date Value Ref Range Status   05/01/2020 3.0 (H) 0.1 - 1.0 mg/dL Final     Comment:     For infants and newborns, interpretation of results should be based  on gestational  age, weight and in agreement with clinical  observations.  Premature Infant recommended reference ranges:  Up to 24 hours.............<8.0 mg/dL  Up to 48 hours............<12.0 mg/dL  3-5 days..................<15.0 mg/dL  6-29 days.................<15.0 mg/dL       Albumin   Date Value Ref Range Status   05/01/2020 2.5 (L) 3.5 - 5.2 g/dL Final     INR   Date Value Ref Range Status   05/01/2020 1.3 (H) 0.8 - 1.2 Final     Comment:     Coumadin Therapy:  2.0 - 3.0 for INR for all indicators except mechanical heart valves  and antiphospholipid syndromes which should use 2.5 - 3.5.           Assessment/Plan:     1. Methamphetamine use    2. Hepatic encephalopathy    3. Other ascites    4. Decompensated HCV cirrhosis    5. Nausea      Jossy Siegel is a 59 y.o. female withCirrhosis    Methamphetamine use-concerned about patient's drug use.  Suspect she downplays it.  Discussed with her that she needs complete abstinence from illicit drug use in order to be a transplant candidate.  -     Toxicology screen, urine; Future; Expected date: 05/11/2020    Hepatic encephalopathy-controlled at this time, concerned could be exacerbated by her medication use.  Patient also acknowledge occasion taking a pain medication off the street.  -continue with lactulose  -avoid illicit drug use    Other ascites-decompensated  -decrease ciprofloxacin frequency to weekly  -continue with paracenteses p.r.n.  -will check labs today and if okay will try to increase diuretics  -     ciprofloxacin HCl (CIPRO) 500 MG tablet; Take 1 tablet (500 mg total) by mouth once a week.  Dispense: 30 tablet; Refill: 0  -     Comprehensive metabolic panel; Future; Expected date: 05/11/2020  -     Protime-INR; Future; Expected date: 05/11/2020    Decompensated HCV cirrhosis-decompensated but meld score has remained relatively low.  I was considering sending patient for transplant evaluation even with low meld but now she is not a transplant candidate  given concern for significant illicit drug use history  -HCC surveillance due October 2020  -on metoprolol now may need to switch that to a different beta-blocker or consider upper endoscopy  -     Comprehensive metabolic panel; Future; Expected date: 05/11/2020  -     CBC auto differential; Future; Expected date: 05/11/2020  -     Protime-INR; Future; Expected date: 05/11/2020  -     Comprehensive metabolic panel; Future; Expected date: 05/11/2020  -     Protime-INR; Future; Expected date: 05/11/2020    Nausea  -     ondansetron (ZOFRAN-ODT) 4 MG TbDL; Take 1 tablet (4 mg total) by mouth every 8 (eight) hours as needed (nausea).  Dispense: 30 tablet; Refill: 1    Return to clinic in 8 weeks with preclinic labs      Alma Delia Green MD

## 2020-05-11 NOTE — Clinical Note
I see she has an appt with you but not sure why. Is it the cytopenias? If so I think those are likely from her cirrhosis and portal HTN so that appt could be canceled unless you think otherwise.

## 2020-05-12 ENCOUNTER — HOSPITAL ENCOUNTER (INPATIENT)
Facility: HOSPITAL | Age: 60
LOS: 5 days | Discharge: HOME | DRG: 441 | End: 2020-05-17
Attending: EMERGENCY MEDICINE | Admitting: FAMILY MEDICINE
Payer: MEDICAID

## 2020-05-12 DIAGNOSIS — M54.50 CHRONIC BILATERAL LOW BACK PAIN WITHOUT SCIATICA: Chronic | ICD-10-CM

## 2020-05-12 DIAGNOSIS — R65.20 SEVERE SEPSIS WITH ACUTE ORGAN DYSFUNCTION: ICD-10-CM

## 2020-05-12 DIAGNOSIS — K72.91 HEPATIC COMA/ENCEPHALOPATHY: Primary | ICD-10-CM

## 2020-05-12 DIAGNOSIS — A41.9 SEVERE SEPSIS WITH ACUTE ORGAN DYSFUNCTION: ICD-10-CM

## 2020-05-12 DIAGNOSIS — D61.818 PANCYTOPENIA: Chronic | ICD-10-CM

## 2020-05-12 DIAGNOSIS — R41.82 AMS (ALTERED MENTAL STATUS): ICD-10-CM

## 2020-05-12 DIAGNOSIS — R18.8 CIRRHOSIS OF LIVER WITH ASCITES, UNSPECIFIED HEPATIC CIRRHOSIS TYPE: Chronic | ICD-10-CM

## 2020-05-12 DIAGNOSIS — B18.2 CHRONIC HEPATITIS C WITHOUT HEPATIC COMA: ICD-10-CM

## 2020-05-12 DIAGNOSIS — Z72.0 TOBACCO ABUSE: Chronic | ICD-10-CM

## 2020-05-12 DIAGNOSIS — R53.1 WEAKNESS: ICD-10-CM

## 2020-05-12 DIAGNOSIS — K74.60 CIRRHOSIS OF LIVER WITH ASCITES, UNSPECIFIED HEPATIC CIRRHOSIS TYPE: Chronic | ICD-10-CM

## 2020-05-12 DIAGNOSIS — G89.29 CHRONIC BILATERAL LOW BACK PAIN WITHOUT SCIATICA: Chronic | ICD-10-CM

## 2020-05-12 DIAGNOSIS — F14.10 COCAINE ABUSE: ICD-10-CM

## 2020-05-12 DIAGNOSIS — Z99.11 ON MECHANICALLY ASSISTED VENTILATION: ICD-10-CM

## 2020-05-12 DIAGNOSIS — E87.6 HYPOKALEMIA: ICD-10-CM

## 2020-05-12 PROCEDURE — 83605 ASSAY OF LACTIC ACID: CPT

## 2020-05-12 PROCEDURE — 63600175 PHARM REV CODE 636 W HCPCS

## 2020-05-12 PROCEDURE — 85730 THROMBOPLASTIN TIME PARTIAL: CPT

## 2020-05-12 PROCEDURE — 82140 ASSAY OF AMMONIA: CPT

## 2020-05-12 PROCEDURE — 93005 ELECTROCARDIOGRAM TRACING: CPT

## 2020-05-12 PROCEDURE — 25000003 PHARM REV CODE 250

## 2020-05-12 PROCEDURE — 31500 INSERT EMERGENCY AIRWAY: CPT

## 2020-05-12 PROCEDURE — 85025 COMPLETE CBC W/AUTO DIFF WBC: CPT

## 2020-05-12 PROCEDURE — 11000001 HC ACUTE MED/SURG PRIVATE ROOM

## 2020-05-12 PROCEDURE — 85610 PROTHROMBIN TIME: CPT

## 2020-05-12 PROCEDURE — 93010 ELECTROCARDIOGRAM REPORT: CPT | Mod: ,,, | Performed by: INTERNAL MEDICINE

## 2020-05-12 PROCEDURE — 84484 ASSAY OF TROPONIN QUANT: CPT

## 2020-05-12 PROCEDURE — U0002 COVID-19 LAB TEST NON-CDC: HCPCS

## 2020-05-12 PROCEDURE — 99291 CRITICAL CARE FIRST HOUR: CPT | Mod: 25

## 2020-05-12 PROCEDURE — 80053 COMPREHEN METABOLIC PANEL: CPT

## 2020-05-12 PROCEDURE — 93010 EKG 12-LEAD: ICD-10-PCS | Mod: ,,, | Performed by: INTERNAL MEDICINE

## 2020-05-13 PROBLEM — K72.91 HEPATIC COMA/ENCEPHALOPATHY: Status: ACTIVE | Noted: 2020-05-13

## 2020-05-13 PROBLEM — F19.10 DRUG ABUSE: Status: ACTIVE | Noted: 2020-05-13

## 2020-05-13 LAB
ALBUMIN SERPL BCP-MCNC: 2.3 G/DL (ref 3.5–5.2)
ALBUMIN SERPL BCP-MCNC: 2.4 G/DL (ref 3.5–5.2)
ALLENS TEST: ABNORMAL
ALLENS TEST: ABNORMAL
ALP SERPL-CCNC: 164 U/L (ref 55–135)
ALP SERPL-CCNC: 192 U/L (ref 55–135)
ALT SERPL W/O P-5'-P-CCNC: 29 U/L (ref 10–44)
ALT SERPL W/O P-5'-P-CCNC: 30 U/L (ref 10–44)
AMMONIA PLAS-SCNC: 144 UMOL/L (ref 10–50)
AMMONIA PLAS-SCNC: 372 UMOL/L (ref 10–50)
AMPHET+METHAMPHET UR QL: NEGATIVE
ANION GAP SERPL CALC-SCNC: 10 MMOL/L (ref 8–16)
ANION GAP SERPL CALC-SCNC: 13 MMOL/L (ref 8–16)
APTT BLDCRRT: 29.9 SEC (ref 21–32)
AST SERPL-CCNC: 50 U/L (ref 10–40)
AST SERPL-CCNC: 51 U/L (ref 10–40)
BARBITURATES UR QL SCN>200 NG/ML: NEGATIVE
BASOPHILS # BLD AUTO: 0.02 K/UL (ref 0–0.2)
BASOPHILS NFR BLD: 0.6 % (ref 0–1.9)
BENZODIAZ UR QL SCN>200 NG/ML: NEGATIVE
BILIRUB SERPL-MCNC: 1.1 MG/DL (ref 0.1–1)
BILIRUB SERPL-MCNC: 2.1 MG/DL (ref 0.1–1)
BILIRUB UR QL STRIP: NEGATIVE
BUN SERPL-MCNC: 13 MG/DL (ref 8–23)
BUN SERPL-MCNC: 14 MG/DL (ref 8–23)
BZE UR QL SCN: NORMAL
CALCIUM SERPL-MCNC: 7.8 MG/DL (ref 8.7–10.5)
CALCIUM SERPL-MCNC: 7.8 MG/DL (ref 8.7–10.5)
CANNABINOIDS UR QL SCN: NEGATIVE
CHLORIDE SERPL-SCNC: 109 MMOL/L (ref 95–110)
CHLORIDE SERPL-SCNC: 111 MMOL/L (ref 95–110)
CLARITY UR: CLEAR
CO2 SERPL-SCNC: 18 MMOL/L (ref 23–29)
CO2 SERPL-SCNC: 20 MMOL/L (ref 23–29)
COLOR UR: YELLOW
CREAT SERPL-MCNC: 1 MG/DL (ref 0.5–1.4)
CREAT SERPL-MCNC: 1.1 MG/DL (ref 0.5–1.4)
CREAT UR-MCNC: 65.6 MG/DL (ref 15–325)
DELSYS: ABNORMAL
DELSYS: ABNORMAL
DIFFERENTIAL METHOD: ABNORMAL
EOSINOPHIL # BLD AUTO: 0.1 K/UL (ref 0–0.5)
EOSINOPHIL NFR BLD: 1.8 % (ref 0–8)
ERYTHROCYTE [DISTWIDTH] IN BLOOD BY AUTOMATED COUNT: 15.9 % (ref 11.5–14.5)
ERYTHROCYTE [SEDIMENTATION RATE] IN BLOOD BY WESTERGREN METHOD: 16 MM/H
EST. GFR  (AFRICAN AMERICAN): >60 ML/MIN/1.73 M^2
EST. GFR  (AFRICAN AMERICAN): >60 ML/MIN/1.73 M^2
EST. GFR  (NON AFRICAN AMERICAN): 54 ML/MIN/1.73 M^2
EST. GFR  (NON AFRICAN AMERICAN): >60 ML/MIN/1.73 M^2
FIO2: 100
FIO2: 28
FLOW: 2
GLUCOSE SERPL-MCNC: 121 MG/DL (ref 70–110)
GLUCOSE SERPL-MCNC: 98 MG/DL (ref 70–110)
GLUCOSE UR QL STRIP: NEGATIVE
HCO3 UR-SCNC: 23.4 MMOL/L (ref 24–28)
HCO3 UR-SCNC: 30.1 MMOL/L (ref 24–28)
HCT VFR BLD AUTO: 29.8 % (ref 37–48.5)
HGB BLD-MCNC: 9.7 G/DL (ref 12–16)
HGB UR QL STRIP: NEGATIVE
IMM GRANULOCYTES # BLD AUTO: 0.04 K/UL (ref 0–0.04)
IMM GRANULOCYTES NFR BLD AUTO: 1.2 % (ref 0–0.5)
INR PPP: 1.3 (ref 0.8–1.2)
INR PPP: 1.3 (ref 0.8–1.2)
KETONES UR QL STRIP: NEGATIVE
LACTATE SERPL-SCNC: 2.2 MMOL/L (ref 0.5–2.2)
LEUKOCYTE ESTERASE UR QL STRIP: NEGATIVE
LYMPHOCYTES # BLD AUTO: 0.6 K/UL (ref 1–4.8)
LYMPHOCYTES NFR BLD: 16.4 % (ref 18–48)
MAGNESIUM SERPL-MCNC: 1.7 MG/DL (ref 1.6–2.6)
MCH RBC QN AUTO: 31.6 PG (ref 27–31)
MCHC RBC AUTO-ENTMCNC: 32.6 G/DL (ref 32–36)
MCV RBC AUTO: 97 FL (ref 82–98)
METHADONE UR QL SCN>300 NG/ML: NEGATIVE
MODE: ABNORMAL
MODE: ABNORMAL
MONOCYTES # BLD AUTO: 0.5 K/UL (ref 0.3–1)
MONOCYTES NFR BLD: 16.1 % (ref 4–15)
NEUTROPHILS # BLD AUTO: 2.1 K/UL (ref 1.8–7.7)
NEUTROPHILS NFR BLD: 63.9 % (ref 38–73)
NITRITE UR QL STRIP: NEGATIVE
NRBC BLD-RTO: 0 /100 WBC
OPIATES UR QL SCN: NEGATIVE
PCO2 BLDA: 26 MMHG (ref 35–45)
PCO2 BLDA: 37.7 MMHG (ref 35–45)
PCP UR QL SCN>25 NG/ML: NEGATIVE
PEEP: 5
PH SMN: 7.51 [PH] (ref 7.35–7.45)
PH SMN: 7.56 [PH] (ref 7.35–7.45)
PH UR STRIP: 8 [PH] (ref 5–8)
PHOSPHATE SERPL-MCNC: 2.5 MG/DL (ref 2.7–4.5)
PLATELET # BLD AUTO: 95 K/UL (ref 150–350)
PMV BLD AUTO: 11.3 FL (ref 9.2–12.9)
PO2 BLDA: 130 MMHG (ref 80–100)
PO2 BLDA: 525 MMHG (ref 80–100)
POC BE: 1 MMOL/L
POC BE: 7 MMOL/L
POC SATURATED O2: 100 % (ref 95–100)
POC SATURATED O2: 99 % (ref 95–100)
POTASSIUM SERPL-SCNC: 3.5 MMOL/L (ref 3.5–5.1)
POTASSIUM SERPL-SCNC: 4.2 MMOL/L (ref 3.5–5.1)
PROT SERPL-MCNC: 6.8 G/DL (ref 6–8.4)
PROT SERPL-MCNC: 6.9 G/DL (ref 6–8.4)
PROT UR QL STRIP: NEGATIVE
PROTHROMBIN TIME: 13.2 SEC (ref 9–12.5)
PROTHROMBIN TIME: 13.4 SEC (ref 9–12.5)
RBC # BLD AUTO: 3.07 M/UL (ref 4–5.4)
SAMPLE: ABNORMAL
SAMPLE: ABNORMAL
SARS-COV-2 RDRP RESP QL NAA+PROBE: NEGATIVE
SITE: ABNORMAL
SITE: ABNORMAL
SODIUM SERPL-SCNC: 139 MMOL/L (ref 136–145)
SODIUM SERPL-SCNC: 142 MMOL/L (ref 136–145)
SP GR UR STRIP: 1.01 (ref 1–1.03)
TOXICOLOGY INFORMATION: NORMAL
TROPONIN I SERPL DL<=0.01 NG/ML-MCNC: 0.02 NG/ML (ref 0–0.03)
URN SPEC COLLECT METH UR: NORMAL
UROBILINOGEN UR STRIP-ACNC: NEGATIVE EU/DL
VT: 400
WBC # BLD AUTO: 3.35 K/UL (ref 3.9–12.7)

## 2020-05-13 PROCEDURE — 81003 URINALYSIS AUTO W/O SCOPE: CPT | Mod: 59

## 2020-05-13 PROCEDURE — 25000003 PHARM REV CODE 250: Performed by: FAMILY MEDICINE

## 2020-05-13 PROCEDURE — 82803 BLOOD GASES ANY COMBINATION: CPT

## 2020-05-13 PROCEDURE — 83735 ASSAY OF MAGNESIUM: CPT

## 2020-05-13 PROCEDURE — 63600175 PHARM REV CODE 636 W HCPCS: Performed by: EMERGENCY MEDICINE

## 2020-05-13 PROCEDURE — 94002 VENT MGMT INPAT INIT DAY: CPT

## 2020-05-13 PROCEDURE — 80053 COMPREHEN METABOLIC PANEL: CPT

## 2020-05-13 PROCEDURE — 99900035 HC TECH TIME PER 15 MIN (STAT)

## 2020-05-13 PROCEDURE — S0028 INJECTION, FAMOTIDINE, 20 MG: HCPCS | Performed by: FAMILY MEDICINE

## 2020-05-13 PROCEDURE — 94003 VENT MGMT INPAT SUBQ DAY: CPT

## 2020-05-13 PROCEDURE — 82140 ASSAY OF AMMONIA: CPT

## 2020-05-13 PROCEDURE — 25000003 PHARM REV CODE 250: Performed by: EMERGENCY MEDICINE

## 2020-05-13 PROCEDURE — 99900026 HC AIRWAY MAINTENANCE (STAT)

## 2020-05-13 PROCEDURE — 80307 DRUG TEST PRSMV CHEM ANLYZR: CPT

## 2020-05-13 PROCEDURE — 36600 WITHDRAWAL OF ARTERIAL BLOOD: CPT

## 2020-05-13 PROCEDURE — 85610 PROTHROMBIN TIME: CPT

## 2020-05-13 PROCEDURE — 36415 COLL VENOUS BLD VENIPUNCTURE: CPT

## 2020-05-13 PROCEDURE — 63600175 PHARM REV CODE 636 W HCPCS: Performed by: FAMILY MEDICINE

## 2020-05-13 PROCEDURE — 20000000 HC ICU ROOM

## 2020-05-13 PROCEDURE — 94760 N-INVAS EAR/PLS OXIMETRY 1: CPT

## 2020-05-13 PROCEDURE — 84100 ASSAY OF PHOSPHORUS: CPT

## 2020-05-13 RX ORDER — LACTULOSE 10 G/15ML
200 SOLUTION ORAL; RECTAL 3 TIMES DAILY
Status: DISCONTINUED | OUTPATIENT
Start: 2020-05-13 | End: 2020-05-13

## 2020-05-13 RX ORDER — ENOXAPARIN SODIUM 100 MG/ML
40 INJECTION SUBCUTANEOUS EVERY 24 HOURS
Status: DISCONTINUED | OUTPATIENT
Start: 2020-05-13 | End: 2020-05-17 | Stop reason: HOSPADM

## 2020-05-13 RX ORDER — PROPOFOL 10 MG/ML
20 INJECTION, EMULSION INTRAVENOUS
Status: COMPLETED | OUTPATIENT
Start: 2020-05-13 | End: 2020-05-13

## 2020-05-13 RX ORDER — PROPOFOL 10 MG/ML
5 INJECTION, EMULSION INTRAVENOUS CONTINUOUS
Status: DISCONTINUED | OUTPATIENT
Start: 2020-05-13 | End: 2020-05-15

## 2020-05-13 RX ORDER — CHLORHEXIDINE GLUCONATE ORAL RINSE 1.2 MG/ML
15 SOLUTION DENTAL 2 TIMES DAILY
Status: DISCONTINUED | OUTPATIENT
Start: 2020-05-13 | End: 2020-05-15

## 2020-05-13 RX ORDER — LACTULOSE 10 G/15ML
30 SOLUTION ORAL 3 TIMES DAILY
Status: DISCONTINUED | OUTPATIENT
Start: 2020-05-13 | End: 2020-05-16

## 2020-05-13 RX ORDER — LACTULOSE 10 G/15ML
30 SOLUTION ORAL
Status: COMPLETED | OUTPATIENT
Start: 2020-05-13 | End: 2020-05-13

## 2020-05-13 RX ORDER — CEFEPIME HYDROCHLORIDE 1 G/50ML
2 INJECTION, SOLUTION INTRAVENOUS
Status: DISCONTINUED | OUTPATIENT
Start: 2020-05-14 | End: 2020-05-17 | Stop reason: HOSPADM

## 2020-05-13 RX ORDER — FAMOTIDINE 10 MG/ML
20 INJECTION INTRAVENOUS 2 TIMES DAILY
Status: DISCONTINUED | OUTPATIENT
Start: 2020-05-13 | End: 2020-05-14

## 2020-05-13 RX ORDER — SUCCINYLCHOLINE CHLORIDE 20 MG/ML
100 INJECTION INTRAMUSCULAR; INTRAVENOUS
Status: COMPLETED | OUTPATIENT
Start: 2020-05-13 | End: 2020-05-13

## 2020-05-13 RX ORDER — SPIRONOLACTONE 25 MG/1
100 TABLET ORAL 2 TIMES DAILY
Status: DISCONTINUED | OUTPATIENT
Start: 2020-05-13 | End: 2020-05-13

## 2020-05-13 RX ORDER — FUROSEMIDE 40 MG/1
40 TABLET ORAL 2 TIMES DAILY
Status: DISCONTINUED | OUTPATIENT
Start: 2020-05-13 | End: 2020-05-15

## 2020-05-13 RX ORDER — LACTULOSE 10 G/15ML
30 SOLUTION ORAL
Status: DISCONTINUED | OUTPATIENT
Start: 2020-05-13 | End: 2020-05-13

## 2020-05-13 RX ORDER — PROPOFOL 10 MG/ML
INJECTION, EMULSION INTRAVENOUS
Status: COMPLETED
Start: 2020-05-13 | End: 2020-05-13

## 2020-05-13 RX ORDER — SPIRONOLACTONE 25 MG/5ML
100 SUSPENSION ORAL DAILY
Status: DISCONTINUED | OUTPATIENT
Start: 2020-05-13 | End: 2020-05-15

## 2020-05-13 RX ORDER — ACETAMINOPHEN 325 MG/1
650 TABLET ORAL EVERY 8 HOURS PRN
Status: DISCONTINUED | OUTPATIENT
Start: 2020-05-14 | End: 2020-05-14

## 2020-05-13 RX ORDER — METOPROLOL TARTRATE 25 MG/1
25 TABLET, FILM COATED ORAL 2 TIMES DAILY
Status: DISCONTINUED | OUTPATIENT
Start: 2020-05-13 | End: 2020-05-15

## 2020-05-13 RX ORDER — ETOMIDATE 2 MG/ML
20 INJECTION INTRAVENOUS
Status: COMPLETED | OUTPATIENT
Start: 2020-05-13 | End: 2020-05-13

## 2020-05-13 RX ADMIN — CHLORHEXIDINE GLUCONATE 0.12% ORAL RINSE 15 ML: 1.2 LIQUID ORAL at 08:05

## 2020-05-13 RX ADMIN — METOPROLOL TARTRATE 25 MG: 25 TABLET, FILM COATED ORAL at 08:05

## 2020-05-13 RX ADMIN — LACTULOSE 30 G: 10 SOLUTION ORAL at 10:05

## 2020-05-13 RX ADMIN — FAMOTIDINE 20 MG: 10 INJECTION INTRAVENOUS at 08:05

## 2020-05-13 RX ADMIN — LACTULOSE 30 G: 20 SOLUTION ORAL at 05:05

## 2020-05-13 RX ADMIN — LACTULOSE 30 G: 10 SOLUTION ORAL at 03:05

## 2020-05-13 RX ADMIN — FUROSEMIDE 40 MG: 40 TABLET ORAL at 08:05

## 2020-05-13 RX ADMIN — PROPOFOL 20 MCG/KG/MIN: 10 INJECTION, EMULSION INTRAVENOUS at 03:05

## 2020-05-13 RX ADMIN — PROPOFOL 10 MCG/KG/MIN: 10 INJECTION, EMULSION INTRAVENOUS at 06:05

## 2020-05-13 RX ADMIN — ETOMIDATE 20 MG: 2 INJECTION, SOLUTION INTRAVENOUS at 03:05

## 2020-05-13 RX ADMIN — ENOXAPARIN SODIUM 40 MG: 100 INJECTION SUBCUTANEOUS at 06:05

## 2020-05-13 RX ADMIN — CAROSPIR 100 MG: 25 SUSPENSION ORAL at 10:05

## 2020-05-13 RX ADMIN — LACTULOSE 30 G: 20 SOLUTION ORAL at 03:05

## 2020-05-13 RX ADMIN — SUCCINYLCHOLINE CHLORIDE 100 MG: 20 INJECTION, SOLUTION INTRAMUSCULAR; INTRAVENOUS; PARENTERAL at 03:05

## 2020-05-13 RX ADMIN — LACTULOSE 200 G: 10 SOLUTION ORAL; RECTAL at 01:05

## 2020-05-13 RX ADMIN — LACTULOSE 30 G: 10 SOLUTION ORAL at 08:05

## 2020-05-13 RX ADMIN — LORAZEPAM 2 MG: 2 INJECTION INTRAMUSCULAR; INTRAVENOUS at 05:05

## 2020-05-13 RX ADMIN — LACTULOSE 30 G: 20 SOLUTION ORAL at 07:05

## 2020-05-13 NOTE — PROGRESS NOTES
Pt brought to CT with ambu bag 100% 02, returned to ER and placed on previous vent settings, tolerated well.

## 2020-05-13 NOTE — PLAN OF CARE
Pt transported to ICU via ambu bag at 100% FiO2. Placed back on ventilator with ordered settings. Report given to Lilian ROMERO

## 2020-05-13 NOTE — SUBJECTIVE & OBJECTIVE
Past Medical History:   Diagnosis Date    Alcoholic cirrhosis of liver with ascites     Cirrhosis     Hepatitis     Pancytopenia 2/5/2020    Last Assessment & Plan:  History & Physical Chronic.  Stable.  Follow-up repeat CBC and INR in a.m.  Discharge Summary  Chronic.  Stable. Likely secondary to cirrhosis. Follow-up  Chronic.  Stable. Likely secondary to cirrhosis.       Past Surgical History:   Procedure Laterality Date    PARACENTESIS         Review of patient's allergies indicates:  No Known Allergies    Family History     None        Tobacco Use    Smoking status: Current Every Day Smoker     Packs/day: 0.25     Years: 45.00     Pack years: 11.25     Types: Cigarettes    Smokeless tobacco: Never Used   Substance and Sexual Activity    Alcohol use: Not Currently    Drug use: Never    Sexual activity: Not on file         Review of Systems   Unable to perform ROS: Intubated     Objective:     Vital Signs (Most Recent):  Temp: 97.8 °F (36.6 °C) (05/12/20 2258)  Pulse: (!) 116 (05/13/20 0517)  Resp: (!) 22 (05/13/20 0517)  BP: (!) 128/59 (05/13/20 0517)  SpO2: 100 % (05/13/20 0517) Vital Signs (24h Range):  Temp:  [97.8 °F (36.6 °C)] 97.8 °F (36.6 °C)  Pulse:  [] 116  Resp:  [16-36] 22  SpO2:  [97 %-100 %] 100 %  BP: (101-152)/(52-76) 128/59     Weight: 60.3 kg (133 lb)  Body mass index is 24.33 kg/m².    No intake or output data in the 24 hours ending 05/13/20 0531    Physical Exam   Constitutional: She appears well-developed and well-nourished. No distress.   HENT:   Head: Normocephalic and atraumatic.   Eyes: Conjunctivae are normal. Right eye exhibits no discharge. Left eye exhibits no discharge.   Cardiovascular: Normal rate, regular rhythm and normal heart sounds. Exam reveals no gallop and no friction rub.   No murmur heard.  Pulmonary/Chest: Effort normal and breath sounds normal. No stridor. No respiratory distress. She has no wheezes. She has no rales.   Intubated   Abdominal: Soft. Bowel  sounds are normal. She exhibits distension. She exhibits no mass. There is no tenderness. There is no guarding.   Musculoskeletal: She exhibits no edema.   Neurological:   Sedated   Skin: Skin is warm. She is not diaphoretic. No erythema.       Vents:  Vent Mode: A/C (05/13/20 0352)  Ventilator Initiated: Yes (05/13/20 0309)  Set Rate: 16 BPM (05/13/20 0352)  Vt Set: 400 mL (05/13/20 0352)  Pressure Support: 0 cmH20 (05/13/20 0352)  PEEP/CPAP: 5 cmH20 (05/13/20 0352)  Oxygen Concentration (%): 40 (05/13/20 0517)  Peak Airway Pressure: 12 cmH2O (05/13/20 0352)  Plateau Pressure: 0 cmH20 (05/13/20 0352)  Total Ve: 11.5 mL (05/13/20 0352)  F/VT Ratio<105 (RSBI): (!) 66.97 (05/13/20 0326)    Lines/Drains/Airways     Drain                 NG/OG Tube 05/13/20 0315 16 Fr. Right mouth less than 1 day         Urethral Catheter 05/13/20 0329 Latex 16 Fr. less than 1 day          Airway                 Airway - Non-Surgical 05/13/20 0305 Endotracheal Tube less than 1 day          Peripheral Intravenous Line                 Peripheral IV - Single Lumen 05/12/20 18 G Left Forearm 1 day         Peripheral IV - Single Lumen 05/12/20 2342 20 G Right Hand less than 1 day                Significant Labs:    CBC/Anemia Profile:  Recent Labs   Lab 05/12/20  2344   WBC 3.35*   HGB 9.7*   HCT 29.8*   PLT 95*   MCV 97   RDW 15.9*        Chemistries:  Recent Labs   Lab 05/11/20  1330 05/12/20  2344    139   K 3.5 4.2    109   CO2 21* 20*   BUN 13 13   CREATININE 1.1 1.0   CALCIUM 8.3* 7.8*   ALBUMIN 2.6* 2.4*   PROT 7.4 6.9   BILITOT 1.5* 1.1*   ALKPHOS 190* 192*   ALT 34 30   AST 46* 50*       Results for orders placed or performed during the hospital encounter of 05/12/20   CBC auto differential   Result Value Ref Range    WBC 3.35 (L) 3.90 - 12.70 K/uL    RBC 3.07 (L) 4.00 - 5.40 M/uL    Hemoglobin 9.7 (L) 12.0 - 16.0 g/dL    Hematocrit 29.8 (L) 37.0 - 48.5 %    Mean Corpuscular Volume 97 82 - 98 fL    Mean Corpuscular  Hemoglobin 31.6 (H) 27.0 - 31.0 pg    Mean Corpuscular Hemoglobin Conc 32.6 32.0 - 36.0 g/dL    RDW 15.9 (H) 11.5 - 14.5 %    Platelets 95 (L) 150 - 350 K/uL    MPV 11.3 9.2 - 12.9 fL    Immature Granulocytes 1.2 (H) 0.0 - 0.5 %    Gran # (ANC) 2.1 1.8 - 7.7 K/uL    Immature Grans (Abs) 0.04 0.00 - 0.04 K/uL    Lymph # 0.6 (L) 1.0 - 4.8 K/uL    Mono # 0.5 0.3 - 1.0 K/uL    Eos # 0.1 0.0 - 0.5 K/uL    Baso # 0.02 0.00 - 0.20 K/uL    nRBC 0 0 /100 WBC    Gran% 63.9 38.0 - 73.0 %    Lymph% 16.4 (L) 18.0 - 48.0 %    Mono% 16.1 (H) 4.0 - 15.0 %    Eosinophil% 1.8 0.0 - 8.0 %    Basophil% 0.6 0.0 - 1.9 %    Differential Method Automated    Comprehensive metabolic panel   Result Value Ref Range    Sodium 139 136 - 145 mmol/L    Potassium 4.2 3.5 - 5.1 mmol/L    Chloride 109 95 - 110 mmol/L    CO2 20 (L) 23 - 29 mmol/L    Glucose 121 (H) 70 - 110 mg/dL    BUN, Bld 13 8 - 23 mg/dL    Creatinine 1.0 0.5 - 1.4 mg/dL    Calcium 7.8 (L) 8.7 - 10.5 mg/dL    Total Protein 6.9 6.0 - 8.4 g/dL    Albumin 2.4 (L) 3.5 - 5.2 g/dL    Total Bilirubin 1.1 (H) 0.1 - 1.0 mg/dL    Alkaline Phosphatase 192 (H) 55 - 135 U/L    AST 50 (H) 10 - 40 U/L    ALT 30 10 - 44 U/L    Anion Gap 10 8 - 16 mmol/L    eGFR if African American >60 >60 mL/min/1.73 m^2    eGFR if non African American >60 >60 mL/min/1.73 m^2   Protime-INR   Result Value Ref Range    Prothrombin Time 13.2 (H) 9.0 - 12.5 sec    INR 1.3 (H) 0.8 - 1.2   APTT   Result Value Ref Range    aPTT 29.9 21.0 - 32.0 sec   Lactic acid, plasma   Result Value Ref Range    Lactate (Lactic Acid) 2.2 0.5 - 2.2 mmol/L   COVID-19 Rapid Screening   Result Value Ref Range    SARS-CoV-2 RNA, Amplification, Qual Negative Negative   Ammonia   Result Value Ref Range    Ammonia 372 (H) 10 - 50 umol/L   Troponin I   Result Value Ref Range    Troponin I 0.019 0.000 - 0.026 ng/mL   Urinalysis, Reflex to Urine Culture Urine, Clean Catch   Result Value Ref Range    Specimen UA Urine, Catheterized     Color, UA  Yellow Yellow, Straw, Laure    Appearance, UA Clear Clear    pH, UA 8.0 5.0 - 8.0    Specific Gravity, UA 1.015 1.005 - 1.030    Protein, UA Negative Negative    Glucose, UA Negative Negative    Ketones, UA Negative Negative    Bilirubin (UA) Negative Negative    Occult Blood UA Negative Negative    Nitrite, UA Negative Negative    Urobilinogen, UA Negative <2.0 EU/dL    Leukocytes, UA Negative Negative   Drug screen panel, emergency   Result Value Ref Range    Benzodiazepines Negative     Methadone metabolites Negative     Cocaine (Metab.) Presumptive Positive     Opiate Scrn, Ur Negative     Barbiturate Screen, Ur Negative     Amphetamine Screen, Ur Negative     THC Negative     Phencyclidine Negative     Creatinine, Random Ur 65.6 15.0 - 325.0 mg/dL    Toxicology Information SEE COMMENT    ISTAT PROCEDURE   Result Value Ref Range    POC PH 7.563 (HH) 7.35 - 7.45    POC PCO2 26.0 (LL) 35 - 45 mmHg    POC PO2 130 (H) 80 - 100 mmHg    POC HCO3 23.4 (L) 24 - 28 mmol/L    POC BE 1 -2 to 2 mmol/L    POC SATURATED O2 99 95 - 100 %    Sample ARTERIAL     Site RR     Allens Test Pass     DelSys Nasal Can     Mode SPONT     Flow 2     FiO2 28    ISTAT PROCEDURE   Result Value Ref Range    POC PH 7.511 (H) 7.35 - 7.45    POC PCO2 37.7 35 - 45 mmHg    POC PO2 525 (H) 80 - 100 mmHg    POC HCO3 30.1 (H) 24 - 28 mmol/L    POC BE 7 -2 to 2 mmol/L    POC SATURATED O2 100 95 - 100 %    Rate 16     Sample ARTERIAL     Site RR     Allens Test Pass     DelSys Adult Vent     Mode AC/PRVC     Vt 400     PEEP 5     FiO2 100         Significant Imaging:   I have reviewed all pertinent imaging results/findings within the past 24 hours.

## 2020-05-13 NOTE — PROGRESS NOTES
Patient intubated by Dr. Matos and placed on current ventilator settings. Crystal SCOTT at bedside. Patient stable on current settings, HR 93, RR 15, spo2 100%. Will monitor.

## 2020-05-13 NOTE — PROGRESS NOTES
High risk for pressure injury screening completed due to low carole, new admit to ICU intubated, sedated. She is admitted with hepatic coma/encephalopathy and has PMH significant for liver cirrhosis, hep C, drug abuse. She is on waffle mattress overlay and has heel offloading boots in place. Heels intact with no redness. Turned to left side with max assistance. Stage 1 pressure injury to coccyx noted with nonblanchable redness that measures 4x2cm. Painted with cavilon. Recommend pressure injury prevention measures:  Waffle mattress overlay (already in place)  Heel offloading boots (already in place)   Turn q2h with foam wedge  Pad bony prominences  Will follow.

## 2020-05-13 NOTE — CONSULTS
"  [facility]  Adult Nutrition  Consult Note    SUMMARY     Recommendations    Recommendation:  1. Recommend continuous NG feeds of Nutren 1.5 @ 50 mL/hr. Flushes per MD/NP. (inc 48 kcal from propofol, provides 1848 kcal, 72 g protein, 930 mL free water)  2. Weekly weights  3. RD to f/u  Goals: % of EN needs at time of f/u  Nutrition Goal Status: new  Communication of RD Recs: POC, Sticky note    Reason for Assessment    Reason For Assessment: consult  Diagnosis: Hepatic encephalopathy  Relevant Medical History: Alcoholic liver cirrhosis with ascites, Hep C, and drug abuse  General Information Comments:   20  Note patient with controled hepatic encephalopathy. Intubated and placed on vent this AM. NG tube placed this AM. Patient positive for changes in weight with reports from GI on changes in appetite. Usual weight was 147 lbs at GI office visit 20. Current weight is 133 lbs which is a 9.5% weight change in 3 months- difficult to assess intake due to sedated pt, no edema documented. Per RD note at last admission, pt was eating 75% of cardiac diet. Due to recent hospital wide restrictions to limit the transfer of (COVID-19), we are not performing any physical exams at this time. All S/S will be observational; NFPE to be performed at a future date.  Nutrition Discharge Planning: Pending medical course    Nutrition Risk Screen     NTA    Nutrition/Diet History    Typical Food/Fluid Intake: Patient instructed on Cardiac, low sodium diet during previous stays.     Anthropometrics    Temp: 98.8 °F (37.1 °C)  Height: 5' 2" (157.5 cm)  Height (inches): 62 in  Weight Method: Bed Scale  Weight: 60.3 kg (133 lb)  Weight (lb): 133 lb  Ideal Body Weight (IBW), Female: 110 lb  % Ideal Body Weight, Female (lb): 120.91 %  BMI (Calculated): 24.3  BMI Grade: 18.5-24.9 - normal  Usual Body Weight (UBW), k.8 kg  % Usual Body Weight: 90.5  % Weight Change From Usual Weight: -9.69 %   "     Lab/Procedures/Meds    Pertinent Labs Reviewed: reviewed    BMP  Lab Results   Component Value Date     05/12/2020    K 4.2 05/12/2020     05/12/2020    CO2 20 (L) 05/12/2020    BUN 13 05/12/2020    CREATININE 1.0 05/12/2020    CALCIUM 7.8 (L) 05/12/2020    ANIONGAP 10 05/12/2020    ESTGFRAFRICA >60 05/12/2020    EGFRNONAA >60 05/12/2020     Lab Results   Component Value Date    CALCIUM 7.8 (L) 05/12/2020    PHOS 3.5 05/01/2020     Lab Results   Component Value Date    ALBUMIN 2.4 (L) 05/12/2020     Pertinent Medications Reviewed: reviewed  Metoprolol, Lactulose    Physical Findings/Assessment    LANCE      Estimated/Assessed Needs    Weight Used For Calorie Calculations: 60.3 kg (132 lb 15 oz)   Energy Calorie Requirements (kcal): 1721 claories  Energy Need Method: [place] (modified)   Protein requirement: 60-72 g  Weight Used For Protein Calculations: 60.3 kg (132 lb 15 oz)      Estimated Fluid Requirement Method: RDA Method, other (see comments)    RDA Method (mL): 1721  CHO Requirement: 215 g CHO      Nutrition Prescription Ordered    Current Diet Order: (NPO)    Evaluation of Received Nutrient/Fluid Intake    Other Calories (kcal): 48(1.8mL/hr) propofol  % Intake of Estimated Energy Needs: 0 - 25 %  % Meal Intake: NPO     Intake/Output Summary (Last 24 hours) at 5/13/2020 0953  Last data filed at 5/13/2020 0603  Gross per 24 hour   Intake 12.6 ml   Output --   Net 12.6 ml       Nutrition Risk    Level of Risk/Frequency of Follow-up: high/ 2x/week     Assessment and Plan      Nutrition Problem  Inadequate energy intake    Related to (etiology):   Decreased ability to consume sufficient energy    Signs and Symptoms (as evidenced by):   Conditions associated with diagnosis: patient intubated and on vent d/t hepatic encephalopathy     Interventions (treatment strategy):  Collaboration with other care providers     Nutrition Diagnosis Status:   New     Monitor and Evaluation    Food and Nutrient  Adminstration: enteral and parenteral nutrition administration  Anthropometric Measurements: weight  Biochemical Data, Medical Tests and Procedures: electrolyte and renal panel, gastrointestinal profile, glucose/endocrine profile  Nutrition-Focused Physical Findings: overall appearance     Malnutrition Assessment   Malnutrition Assessment:    Weight Loss: >7.5% x 3 months   Signs and symptoms observational due to COVID-19 precaution- will follow and update as possible.       Nutrition Follow-Up    RD Follow-up?: Yes     Maura Field, TIMO, LDN

## 2020-05-13 NOTE — ASSESSMENT & PLAN NOTE
5/13:  30 mg lactulose q.1h x3  Will continue with 30 mg lactulose t.i.d.  Continue monitor ammonia level  Hepatic encephalopathy likely secondary to medication noncompliance  Wean O2 as tolerated  Neuro checks q.4h

## 2020-05-13 NOTE — PLAN OF CARE
Pt arrive to unit from ED @0900. Pt intubated/sedated. Pt unarousable, respond to passive ROM to extremities. BLE decerebate posturing, BUE decorticate posturing. Assessment performed. Telephone update given to pt daughter. Turn pt q2hrs. Will continue to monitor pt.

## 2020-05-13 NOTE — HPI
Patient is a 59-year-old  female with a past medical history of liver cirrhosis, hep C, drug abuse, medication noncompliance presents to the ED due to lethargy.  Patient currently intubated therefore history is limited.  After discussing with emergency contact, it was made known that patient was staying with a friend.  It was reported that EMS found patient lethargic and minimally responsive on the scene.  Upon arrival to the ED, there was concern on patient's ability to protect her airway and she was intubated.  Of note patient was recently discharged on 05/01 after being admitted for hepatic encephalopathy.  Patient follow-up with GI in clinic on 05/11.  Patient is currently full code and surrogate decision maker is her daughter.  After speaking with daughter, it was clarified that emergency contact is patient's boyfriend not spouse.     In the ED, patient was found to have an ammonia level 372.  GI was made aware of the case and lactulose was to be given 30 mg every hour for 3 hrs and then t.i.d. afterwards.  UDS was positive for cocaine. Patient was admitted to ICU.

## 2020-05-13 NOTE — ED PROVIDER NOTES
SCRIBE #1 NOTE: I, Alicia Brownlee, am scribing for, and in the presence of, Shaka Matos MD. I have scribed the entire note.       History     Chief Complaint   Patient presents with    Fatigue     lethargic on scene per EMS, also report fever     Review of patient's allergies indicates:  No Known Allergies      History of Present Illness     HPI    5/13/2020, 10:37 PM  History obtained from the [facility]  HPI/ROS is limited secondary to pt's condition      History of Present Illness: Jossy Siegel is a 59 y.o. female patient with a PMHx of pancytopenia, cirrhosis who presents to the Emergency Department for evaluation. Per [facility], pt was lethargic on scene. HPI/ROS is limited secondary to pt's condition      Arrival mode:  [facility]    PCP: Primary Doctor No        Past Medical History:  Past Medical History:   Diagnosis Date    Alcoholic cirrhosis of liver with ascites     Cirrhosis     Hepatitis     Pancytopenia 2/5/2020    Last Assessment & Plan:  History & Physical Chronic.  Stable.  Follow-up repeat CBC and INR in a.m.  Discharge Summary  Chronic.  Stable. Likely secondary to cirrhosis. Follow-up  Chronic.  Stable. Likely secondary to cirrhosis.       Past Surgical History:  Past Surgical History:   Procedure Laterality Date    PARACENTESIS           Family History:  History reviewed. No pertinent family history.    Social History:  Social History     Tobacco Use    Smoking status: Current Every Day Smoker     Packs/day: 0.25     Years: 45.00     Pack years: 11.25     Types: Cigarettes    Smokeless tobacco: Never Used   Substance and Sexual Activity    Alcohol use: Not Currently    Drug use: Never    Sexual activity: Unknown        Review of Systems     Review of Systems   Unable to perform ROS: Acuity of condition        Physical Exam     Initial Vitals [05/12/20 2258]   BP Pulse Resp Temp SpO2   (!) 101/52 82 (!) 22 97.8 °F (36.6 °C) 97 %      MAP       --          Physical Exam  Nursing Notes and  Vital Signs Reviewed.  Constitutional: Pt is unresponsive.  Head: Atraumatic. Normocephalic.  Eyes: PERRL. EOM intact. Conjunctivae are not pale. No scleral icterus.  ENT: Mucous membranes are moist. Oropharynx is clear and symmetric.    Neck: Supple. Full ROM. No lymphadenopathy.  Cardiovascular: Tachycardic. Regular rhythm. No murmurs, rubs, or gallops. Distal pulses are 2+ and symmetric.  Pulmonary/Chest: Tachypneic. No wheezing or rales.  Abdominal: Soft and non-distended.  There is no tenderness.  No rebound, guarding, or rigidity.   Musculoskeletal: Moves all extremities. No obvious deformities. No edema. No calf tenderness.  Skin: Warm and dry.  Neurological: Full neuro exam limited due to patient's condition       ED Course   Critical Care  Date/Time: 5/13/2020 3:43 AM  Performed by: Shaka Matos MD  Authorized by: Shaka Matos MD   Direct patient critical care time: 30 minutes  Additional history critical care time: 6 minutes  Ordering / reviewing critical care time: 6 minutes  Documentation critical care time: 6 minutes  Consulting other physicians critical care time: 6 minutes  Consult with family critical care time: 6 minutes  Total critical care time (exclusive of procedural time) : 60 minutes  Critical care time was exclusive of separately billable procedures and treating other patients and teaching time.  Critical care was necessary to treat or prevent imminent or life-threatening deterioration of the following conditions: respiratory failure.  Critical care was time spent personally by me on the following activities: blood draw for specimens, development of treatment plan with patient or surrogate, discussions with consultants, interpretation of cardiac output measurements, evaluation of patient's response to treatment, examination of patient, obtaining history from patient or surrogate, ordering and performing treatments and interventions, ordering and review of laboratory studies, ordering and  "review of radiographic studies, review of old charts, re-evaluation of patient's condition and ventilator management.    Intubation  Date/Time: 5/13/2020 3:43 AM  Performed by: Shaka Matos MD  Authorized by: Shaka Matos MD   Consent Done: Emergent Situation  Indications: airway protection  Intubation method: direct  Patient status: paralyzed (RSI)  Sedatives: etomidate  Paralytic: succinylcholine  Laryngoscope size: Mac 4  Tube size: 7.0 mm  Tube type: cuffed  Number of attempts: 1  Post-procedure assessment: CO2 detector  Breath sounds: clear and equal  ETT to lip: 23 cm  Tube secured with: ETT mendoza  Chest x-ray interpreted by me.  Chest x-ray findings: endotracheal tube in appropriate position  Patient tolerance: Patient tolerated the procedure well with no immediate complications  Complications: No        ED Vital Signs:  Vitals:    05/12/20 2258 05/12/20 2335 05/12/20 2336 05/13/20 0002   BP: (!) 101/52  (!) 114/56 (!) 125/59   Pulse: 82 91 93 96   Resp: (!) 22  (!) 23 (!) 23   Temp: 97.8 °F (36.6 °C)      TempSrc: Oral      SpO2: 97%  99% 100%   Weight: 60.3 kg (133 lb)      Height: 5' 2" (1.575 m)       05/13/20 0009 05/13/20 0222 05/13/20 0302 05/13/20 0306   BP: 132/67  (!) 152/76    Pulse: 89 109 (!) 120 (!) 116   Resp: (!) 23  (!) 32 (!) 21   Temp:       TempSrc:       SpO2: 100% 100% 97% 100%   Weight:       Height:        05/13/20 0309 05/13/20 0326 05/13/20 0352 05/13/20 0405   BP:    (!) 119/59   Pulse: 106 103 101 106   Resp: 16 (!) 29  (!) 36   Temp:       TempSrc:       SpO2: 100% 100% 100% 100%   Weight:       Height:        05/13/20 0431 05/13/20 0517   BP: 136/64 (!) 128/59   Pulse: (!) 116 (!) 116   Resp: (!) 22 (!) 22   Temp:     TempSrc:     SpO2: 100% 100%   Weight:     Height:         Abnormal Lab Results:  Labs Reviewed   CBC W/ AUTO DIFFERENTIAL - Abnormal; Notable for the following components:       Result Value    WBC 3.35 (*)     RBC 3.07 (*)     Hemoglobin 9.7 (*)     Hematocrit " 29.8 (*)     Mean Corpuscular Hemoglobin 31.6 (*)     RDW 15.9 (*)     Platelets 95 (*)     Immature Granulocytes 1.2 (*)     Lymph # 0.6 (*)     Lymph% 16.4 (*)     Mono% 16.1 (*)     All other components within normal limits   COMPREHENSIVE METABOLIC PANEL - Abnormal; Notable for the following components:    CO2 20 (*)     Glucose 121 (*)     Calcium 7.8 (*)     Albumin 2.4 (*)     Total Bilirubin 1.1 (*)     Alkaline Phosphatase 192 (*)     AST 50 (*)     All other components within normal limits   PROTIME-INR - Abnormal; Notable for the following components:    Prothrombin Time 13.2 (*)     INR 1.3 (*)     All other components within normal limits   AMMONIA - Abnormal; Notable for the following components:    Ammonia 372 (*)     All other components within normal limits   ISTAT PROCEDURE - Abnormal; Notable for the following components:    POC PH 7.563 (*)     POC PCO2 26.0 (*)     POC PO2 130 (*)     POC HCO3 23.4 (*)     All other components within normal limits   ISTAT PROCEDURE - Abnormal; Notable for the following components:    POC PH 7.511 (*)     POC PO2 525 (*)     POC HCO3 30.1 (*)     All other components within normal limits   APTT   LACTIC ACID, PLASMA   SARS-COV-2 RNA AMPLIFICATION, QUAL   TROPONIN I   URINALYSIS, REFLEX TO URINE CULTURE    Narrative:     Preferred Collection Type->Urine, Clean Catch   DRUG SCREEN PANEL, URINE EMERGENCY    Narrative:     Preferred Collection Type->Urine, Clean Catch        All Lab Results:  Results for orders placed or performed during the hospital encounter of 05/12/20   CBC auto differential   Result Value Ref Range    WBC 3.35 (L) 3.90 - 12.70 K/uL    RBC 3.07 (L) 4.00 - 5.40 M/uL    Hemoglobin 9.7 (L) 12.0 - 16.0 g/dL    Hematocrit 29.8 (L) 37.0 - 48.5 %    Mean Corpuscular Volume 97 82 - 98 fL    Mean Corpuscular Hemoglobin 31.6 (H) 27.0 - 31.0 pg    Mean Corpuscular Hemoglobin Conc 32.6 32.0 - 36.0 g/dL    RDW 15.9 (H) 11.5 - 14.5 %    Platelets 95 (L) 150 -  350 K/uL    MPV 11.3 9.2 - 12.9 fL    Immature Granulocytes 1.2 (H) 0.0 - 0.5 %    Gran # (ANC) 2.1 1.8 - 7.7 K/uL    Immature Grans (Abs) 0.04 0.00 - 0.04 K/uL    Lymph # 0.6 (L) 1.0 - 4.8 K/uL    Mono # 0.5 0.3 - 1.0 K/uL    Eos # 0.1 0.0 - 0.5 K/uL    Baso # 0.02 0.00 - 0.20 K/uL    nRBC 0 0 /100 WBC    Gran% 63.9 38.0 - 73.0 %    Lymph% 16.4 (L) 18.0 - 48.0 %    Mono% 16.1 (H) 4.0 - 15.0 %    Eosinophil% 1.8 0.0 - 8.0 %    Basophil% 0.6 0.0 - 1.9 %    Differential Method Automated    Comprehensive metabolic panel   Result Value Ref Range    Sodium 139 136 - 145 mmol/L    Potassium 4.2 3.5 - 5.1 mmol/L    Chloride 109 95 - 110 mmol/L    CO2 20 (L) 23 - 29 mmol/L    Glucose 121 (H) 70 - 110 mg/dL    BUN, Bld 13 8 - 23 mg/dL    Creatinine 1.0 0.5 - 1.4 mg/dL    Calcium 7.8 (L) 8.7 - 10.5 mg/dL    Total Protein 6.9 6.0 - 8.4 g/dL    Albumin 2.4 (L) 3.5 - 5.2 g/dL    Total Bilirubin 1.1 (H) 0.1 - 1.0 mg/dL    Alkaline Phosphatase 192 (H) 55 - 135 U/L    AST 50 (H) 10 - 40 U/L    ALT 30 10 - 44 U/L    Anion Gap 10 8 - 16 mmol/L    eGFR if African American >60 >60 mL/min/1.73 m^2    eGFR if non African American >60 >60 mL/min/1.73 m^2   Protime-INR   Result Value Ref Range    Prothrombin Time 13.2 (H) 9.0 - 12.5 sec    INR 1.3 (H) 0.8 - 1.2   APTT   Result Value Ref Range    aPTT 29.9 21.0 - 32.0 sec   Lactic acid, plasma   Result Value Ref Range    Lactate (Lactic Acid) 2.2 0.5 - 2.2 mmol/L   COVID-19 Rapid Screening   Result Value Ref Range    SARS-CoV-2 RNA, Amplification, Qual Negative Negative   Ammonia   Result Value Ref Range    Ammonia 372 (H) 10 - 50 umol/L   Troponin I   Result Value Ref Range    Troponin I 0.019 0.000 - 0.026 ng/mL   Urinalysis, Reflex to Urine Culture Urine, Clean Catch   Result Value Ref Range    Specimen UA Urine, Catheterized     Color, UA Yellow Yellow, Straw, Laure    Appearance, UA Clear Clear    pH, UA 8.0 5.0 - 8.0    Specific Gravity, UA 1.015 1.005 - 1.030    Protein, UA Negative  Negative    Glucose, UA Negative Negative    Ketones, UA Negative Negative    Bilirubin (UA) Negative Negative    Occult Blood UA Negative Negative    Nitrite, UA Negative Negative    Urobilinogen, UA Negative <2.0 EU/dL    Leukocytes, UA Negative Negative   Drug screen panel, emergency   Result Value Ref Range    Benzodiazepines Negative     Methadone metabolites Negative     Cocaine (Metab.) Presumptive Positive     Opiate Scrn, Ur Negative     Barbiturate Screen, Ur Negative     Amphetamine Screen, Ur Negative     THC Negative     Phencyclidine Negative     Creatinine, Random Ur 65.6 15.0 - 325.0 mg/dL    Toxicology Information SEE COMMENT    ISTAT PROCEDURE   Result Value Ref Range    POC PH 7.563 (HH) 7.35 - 7.45    POC PCO2 26.0 (LL) 35 - 45 mmHg    POC PO2 130 (H) 80 - 100 mmHg    POC HCO3 23.4 (L) 24 - 28 mmol/L    POC BE 1 -2 to 2 mmol/L    POC SATURATED O2 99 95 - 100 %    Sample ARTERIAL     Site RR     Allens Test Pass     DelSys Nasal Can     Mode SPONT     Flow 2     FiO2 28    ISTAT PROCEDURE   Result Value Ref Range    POC PH 7.511 (H) 7.35 - 7.45    POC PCO2 37.7 35 - 45 mmHg    POC PO2 525 (H) 80 - 100 mmHg    POC HCO3 30.1 (H) 24 - 28 mmol/L    POC BE 7 -2 to 2 mmol/L    POC SATURATED O2 100 95 - 100 %    Rate 16     Sample ARTERIAL     Site RR     Allens Test Pass     DelSys Adult Vent     Mode AC/PRVC     Vt 400     PEEP 5     FiO2 100          Imaging Results:  Imaging Results          CT Head Without Contrast (In process)                X-Ray Chest AP Portable (In process)                X-Ray Chest AP Portable (In process)                Per ED physician, pt's CXR results NAF.    Per ED physician, pt's CXR results ETT in appropriate position.    4:08 AM: Per STAT radiology, pt's CT head results: no acute hemorrhage, hydrocephalus, or mass effect.        The EKG was ordered, reviewed, and independently interpreted by the ED provider.  Interpretation time: 2341  Rate: 95 BPM  Rhythm: normal  sinus rhythm  Interpretation: Incomplete RBBB. Possible inferior infarct, age undetermined. Anterolateral infarct, age undetermined. Prolonged QT. No STEMI.           The Emergency Provider reviewed the vital signs and test results, which are outlined above.     ED Discussion     2:39 AM: Spoke with pt's daughter. She reports usually talks and walks without any assistance. D/w her all pertinent results. D/w her any concerns expressed at this time. Answered all questions. She expresses understanding at this time.    2:44 AM: Dr. Matos discussed the pt's case with Dr. Green (Gastroenterology) who recommends giving lactulose 30g every hour for first 3 hrs then space out to TID via ngt.    4:08 AM: Discussed case with Dr. Sharpe (Hospital Medicine). Dr. Sharpe agrees with current care and management of pt and accepts admission.   Admitting Service: Hospital medicine   Admitting Physician: Dr. Sharpe  Admit to: ICU    4:21 AM: Re-evaluated pt. I have discussed test results, shared treatment plan, and the need for admission with family. Family expresses understanding at this time and agree with all information. All questions answered. Family has no further questions or concerns at this time. Pt is ready for admit.         Medical Decision Making:   Clinical Tests:   Lab Tests: Ordered and Reviewed  Radiological Study: Ordered and Reviewed  Medical Tests: Ordered and Reviewed  ED Management:  Reviewed pt's chart from 4/30/2020.             ED Medication(s):  Medications   lactulose 20 gram/30 mL solution Soln 30 g (30 g Oral Given 5/13/20 4549)   furosemide tablet 40 mg (has no administration in time range)   metoprolol tartrate (LOPRESSOR) tablet 25 mg (has no administration in time range)   spironolactone tablet 100 mg (has no administration in time range)   propofol (DIPRIVAN) 10 mg/mL infusion (10 mcg/kg/min × 60.3 kg Intravenous Rate/Dose Change 5/13/20 7879)   succinylcholine injection 100 mg (100 mg Intravenous Given 5/13/20  0304)   etomidate injection 20 mg (20 mg Intravenous Given 5/13/20 0303)       New Prescriptions    No medications on file               Scribe Attestation:   Scribe #1: I performed the above scribed service and the documentation accurately describes the services I performed. I attest to the accuracy of the note.     Attending:   Physician Attestation Statement for Scribe #1: I, Shaka Matos MD, personally performed the services described in this documentation, as scribed by Alicia Brownlee, in my presence, and it is both accurate and complete.           Clinical Impression       ICD-10-CM ICD-9-CM   1. Hepatic coma/encephalopathy K72.91 572.2   2. Weakness R53.1 780.79   3. AMS (altered mental status) R41.82 780.97       Disposition:   Disposition: AMA  Condition: Critical         Shaka Matos MD  05/13/20 0532

## 2020-05-13 NOTE — PLAN OF CARE
[facility] spoke with the patient's spouse to complete discharge planning assessment. Patient lives with him and he states it has been hard to take care of her, he wants to explore options of care, but refused Nursing Home as an option due to the patient's finances. Patient was recently d/c last week, and arrived back to the hospital, she didn't d/c with any services or any equipment. He denies any current services, and states she has a walker,cane and bedside commode at home. Patient spouse stated that she will see Dr. Green, and identified the physician as her new PCP. At this time, D/C plan unsure due to the family not agreeing on where/ or if they want to place the patient. The spouse mentioned assisted living, CM will follow up once the pt becomes medically stable.     D/C plan: ASSISTED LIVING? Home with Family   PCP: Peter Flannery M.D.[facility] - [address] [address]  Phone: [phone] Fax: [phone]    Bedside: Yes   Transportation: Spouse      05/13/20 1343   Discharge Assessment   Assessment Type Discharge Planning Assessment   Confirmed/corrected address and phone number on facesheet? Yes   Assessment information obtained from? Caregiver   Prior to hospitilization cognitive status: Unable to Assess   Prior to hospitalization functional status: Assistive Equipment   Current cognitive status: Coma/Sedated/Intubated   Current Functional Status: Assistive Equipment   Lives With spouse   Able to Return to Prior Arrangements yes   Is patient able to care for self after discharge? Unable to determine at this time (comments)   Who are your caregiver(s) and their phone number(s)? Derek Gardiner [phone]   Patient's perception of discharge disposition admitted as an inpatient   Readmission Within the Last 30 Days lack of support   Patient currently being followed by outpatient case management? No   Patient currently receives any other outside  agency services? No   Equipment Currently Used at Home walker, standard;cane, quad;bedside commode   Do you have any problems affording any of your prescribed medications? TBD   Does the patient have transportation home? Yes   Transportation Anticipated family or friend will provide   Does the patient receive services at the [facility]? No   Discharge Plan A Assisted Living   Discharge Plan B Home with family   Patient/Family in Agreement with Plan yes   Readmission Questionnaire   At the time of your discharge, did someone talk to you about what your health problems were? Yes   At the time of discharge, did someone talk to you about what to watch out for regarding worsening of your health problem? Yes   At the time of discharge, did someone talk to you about what to do if you experienced worsening of your health problem? Yes   At the time of discharge, did someone talk to you about which medication to take when you left the hospital and which ones to stop taking? Yes   At the time of discharge, did someone talk to you about when and where to follow up with a doctor after you left the hospital? Yes   Do you have problems taking your medications as prescribed? No   Do you have any problems affording any of  your prescribed medications? To be determined

## 2020-05-13 NOTE — H&P
[facility]  Critical Care Medicine  History & Physical    Patient Name: Jossy Siegel  MRN: [id number]  Admission Date: 5/12/2020  Hospital Length of Stay: 0 days  Code Status: Full Code  Attending Physician: Rosendo Sharpe MD  Primary Care Provider: Primary Doctor No   Principal Problem: Hepatic coma/encephalopathy    Subjective:     HPI:  Patient is a 59-year-old  female with a past medical history of liver cirrhosis, hep C, drug abuse, medication noncompliance presents to the ED due to lethargy.  Patient currently intubated therefore history is limited.  After discussing with emergency contact, it was made known that patient was staying with a friend.  It was reported that EMS found patient lethargic and minimally responsive on the scene.  Upon arrival to the ED, there was concern on patient's ability to protect her airway and she was intubated.  Of note patient was recently discharged on 05/01 after being admitted for hepatic encephalopathy.  Patient follow-up with GI in clinic on 05/11.  Patient is currently full code and surrogate decision maker is her daughter.  After speaking with daughter, it was clarified that emergency contact is patient's boyfriend not spouse.     In the ED, patient was found to have an ammonia level 372.  GI was made aware of the case and lactulose was to be given 30 mg every hour for 3 hrs and then t.i.d. afterwards.  UDS was positive for cocaine. Patient was admitted to ICU.    Hospital/ICU Course:  No notes on file     Past Medical History:   Diagnosis Date    Alcoholic cirrhosis of liver with ascites     Cirrhosis     Hepatitis     Pancytopenia 2/5/2020    Last Assessment & Plan:  History & Physical Chronic.  Stable.  Follow-up repeat CBC and INR in a.m.  Discharge Summary  Chronic.  Stable. Likely secondary to cirrhosis. Follow-up  Chronic.  Stable. Likely secondary to cirrhosis.       Past Surgical History:   Procedure Laterality Date     PARACENTESIS         Review of patient's allergies indicates:  No Known Allergies    Family History     None        Tobacco Use    Smoking status: Current Every Day Smoker     Packs/day: 0.25     Years: 45.00     Pack years: 11.25     Types: Cigarettes    Smokeless tobacco: Never Used   Substance and Sexual Activity    Alcohol use: Not Currently    Drug use: Never    Sexual activity: Not on file         Review of Systems   Unable to perform ROS: Intubated     Objective:     Vital Signs (Most Recent):  Temp: 97.8 °F (36.6 °C) (05/12/20 2258)  Pulse: (!) 116 (05/13/20 0517)  Resp: (!) 22 (05/13/20 0517)  BP: (!) 128/59 (05/13/20 0517)  SpO2: 100 % (05/13/20 0517) Vital Signs (24h Range):  Temp:  [97.8 °F (36.6 °C)] 97.8 °F (36.6 °C)  Pulse:  [] 116  Resp:  [16-36] 22  SpO2:  [97 %-100 %] 100 %  BP: (101-152)/(52-76) 128/59     Weight: 60.3 kg (133 lb)  Body mass index is 24.33 kg/m².    No intake or output data in the 24 hours ending 05/13/20 0531    Physical Exam   Constitutional: She appears well-developed and well-nourished. No distress.   HENT:   Head: Normocephalic and atraumatic.   Eyes: Conjunctivae are normal. Right eye exhibits no discharge. Left eye exhibits no discharge.   Cardiovascular: Normal rate, regular rhythm and normal heart sounds. Exam reveals no gallop and no friction rub.   No murmur heard.  Pulmonary/Chest: Effort normal and breath sounds normal. No stridor. No respiratory distress. She has no wheezes. She has no rales.   Intubated   Abdominal: Soft. Bowel sounds are normal. She exhibits distension. She exhibits no mass. There is no tenderness. There is no guarding.   Musculoskeletal: She exhibits no edema.   Neurological:   Sedated   Skin: Skin is warm. She is not diaphoretic. No erythema.       Vents:  Vent Mode: A/C (05/13/20 0352)  Ventilator Initiated: Yes (05/13/20 0309)  Set Rate: 16 BPM (05/13/20 0352)  Vt Set: 400 mL (05/13/20 0352)  Pressure Support: 0 cmH20 (05/13/20  0352)  PEEP/CPAP: 5 cmH20 (05/13/20 0352)  Oxygen Concentration (%): 40 (05/13/20 0517)  Peak Airway Pressure: 12 cmH2O (05/13/20 0352)  Plateau Pressure: 0 cmH20 (05/13/20 0352)  Total Ve: 11.5 mL (05/13/20 0352)  F/VT Ratio<105 (RSBI): (!) 66.97 (05/13/20 0326)    Lines/Drains/Airways     Drain                 NG/OG Tube 05/13/20 0315 16 Fr. Right mouth less than 1 day         Urethral Catheter 05/13/20 0329 Latex 16 Fr. less than 1 day          Airway                 Airway - Non-Surgical 05/13/20 0305 Endotracheal Tube less than 1 day          Peripheral Intravenous Line                 Peripheral IV - Single Lumen 05/12/20 18 G Left Forearm 1 day         Peripheral IV - Single Lumen 05/12/20 2342 20 G Right Hand less than 1 day                Significant Labs:    CBC/Anemia Profile:  Recent Labs   Lab 05/12/20  2344   WBC 3.35*   HGB 9.7*   HCT 29.8*   PLT 95*   MCV 97   RDW 15.9*        Chemistries:  Recent Labs   Lab 05/11/20  1330 05/12/20  2344    139   K 3.5 4.2    109   CO2 21* 20*   BUN 13 13   CREATININE 1.1 1.0   CALCIUM 8.3* 7.8*   ALBUMIN 2.6* 2.4*   PROT 7.4 6.9   BILITOT 1.5* 1.1*   ALKPHOS 190* 192*   ALT 34 30   AST 46* 50*       Results for orders placed or performed during the hospital encounter of 05/12/20   CBC auto differential   Result Value Ref Range    WBC 3.35 (L) 3.90 - 12.70 K/uL    RBC 3.07 (L) 4.00 - 5.40 M/uL    Hemoglobin 9.7 (L) 12.0 - 16.0 g/dL    Hematocrit 29.8 (L) 37.0 - 48.5 %    Mean Corpuscular Volume 97 82 - 98 fL    Mean Corpuscular Hemoglobin 31.6 (H) 27.0 - 31.0 pg    Mean Corpuscular Hemoglobin Conc 32.6 32.0 - 36.0 g/dL    RDW 15.9 (H) 11.5 - 14.5 %    Platelets 95 (L) 150 - 350 K/uL    MPV 11.3 9.2 - 12.9 fL    Immature Granulocytes 1.2 (H) 0.0 - 0.5 %    Gran # (ANC) 2.1 1.8 - 7.7 K/uL    Immature Grans (Abs) 0.04 0.00 - 0.04 K/uL    Lymph # 0.6 (L) 1.0 - 4.8 K/uL    Mono # 0.5 0.3 - 1.0 K/uL    Eos # 0.1 0.0 - 0.5 K/uL    Baso # 0.02 0.00 - 0.20 K/uL     nRBC 0 0 /100 WBC    Gran% 63.9 38.0 - 73.0 %    Lymph% 16.4 (L) 18.0 - 48.0 %    Mono% 16.1 (H) 4.0 - 15.0 %    Eosinophil% 1.8 0.0 - 8.0 %    Basophil% 0.6 0.0 - 1.9 %    Differential Method Automated    Comprehensive metabolic panel   Result Value Ref Range    Sodium 139 136 - 145 mmol/L    Potassium 4.2 3.5 - 5.1 mmol/L    Chloride 109 95 - 110 mmol/L    CO2 20 (L) 23 - 29 mmol/L    Glucose 121 (H) 70 - 110 mg/dL    BUN, Bld 13 8 - 23 mg/dL    Creatinine 1.0 0.5 - 1.4 mg/dL    Calcium 7.8 (L) 8.7 - 10.5 mg/dL    Total Protein 6.9 6.0 - 8.4 g/dL    Albumin 2.4 (L) 3.5 - 5.2 g/dL    Total Bilirubin 1.1 (H) 0.1 - 1.0 mg/dL    Alkaline Phosphatase 192 (H) 55 - 135 U/L    AST 50 (H) 10 - 40 U/L    ALT 30 10 - 44 U/L    Anion Gap 10 8 - 16 mmol/L    eGFR if African American >60 >60 mL/min/1.73 m^2    eGFR if non African American >60 >60 mL/min/1.73 m^2   Protime-INR   Result Value Ref Range    Prothrombin Time 13.2 (H) 9.0 - 12.5 sec    INR 1.3 (H) 0.8 - 1.2   APTT   Result Value Ref Range    aPTT 29.9 21.0 - 32.0 sec   Lactic acid, plasma   Result Value Ref Range    Lactate (Lactic Acid) 2.2 0.5 - 2.2 mmol/L   COVID-19 Rapid Screening   Result Value Ref Range    SARS-CoV-2 RNA, Amplification, Qual Negative Negative   Ammonia   Result Value Ref Range    Ammonia 372 (H) 10 - 50 umol/L   Troponin I   Result Value Ref Range    Troponin I 0.019 0.000 - 0.026 ng/mL   Urinalysis, Reflex to Urine Culture Urine, Clean Catch   Result Value Ref Range    Specimen UA Urine, Catheterized     Color, UA Yellow Yellow, Straw, Laure    Appearance, UA Clear Clear    pH, UA 8.0 5.0 - 8.0    Specific Gravity, UA 1.015 1.005 - 1.030    Protein, UA Negative Negative    Glucose, UA Negative Negative    Ketones, UA Negative Negative    Bilirubin (UA) Negative Negative    Occult Blood UA Negative Negative    Nitrite, UA Negative Negative    Urobilinogen, UA Negative <2.0 EU/dL    Leukocytes, UA Negative Negative   Drug screen panel, emergency    Result Value Ref Range    Benzodiazepines Negative     Methadone metabolites Negative     Cocaine (Metab.) Presumptive Positive     Opiate Scrn, Ur Negative     Barbiturate Screen, Ur Negative     Amphetamine Screen, Ur Negative     THC Negative     Phencyclidine Negative     Creatinine, Random Ur 65.6 15.0 - 325.0 mg/dL    Toxicology Information SEE COMMENT    ISTAT PROCEDURE   Result Value Ref Range    POC PH 7.563 (HH) 7.35 - 7.45    POC PCO2 26.0 (LL) 35 - 45 mmHg    POC PO2 130 (H) 80 - 100 mmHg    POC HCO3 23.4 (L) 24 - 28 mmol/L    POC BE 1 -2 to 2 mmol/L    POC SATURATED O2 99 95 - 100 %    Sample ARTERIAL     Site RR     Allens Test Pass     DelSys Nasal Can     Mode SPONT     Flow 2     FiO2 28    ISTAT PROCEDURE   Result Value Ref Range    POC PH 7.511 (H) 7.35 - 7.45    POC PCO2 37.7 35 - 45 mmHg    POC PO2 525 (H) 80 - 100 mmHg    POC HCO3 30.1 (H) 24 - 28 mmol/L    POC BE 7 -2 to 2 mmol/L    POC SATURATED O2 100 95 - 100 %    Rate 16     Sample ARTERIAL     Site RR     Allens Test Pass     DelSys Adult Vent     Mode AC/PRVC     Vt 400     PEEP 5     FiO2 100         Significant Imaging:   I have reviewed all pertinent imaging results/findings within the past 24 hours.    Assessment/Plan:     Psychiatric  Drug abuse  5/13:  UDS positive for cocaine  Prior admissions UDS was positive for amphetamines  Will counseled on drug cessation once patient more alert    GI  * Hepatic coma/encephalopathy  5/13:  30 mg lactulose q.1h x3  Will continue with 30 mg lactulose t.i.d.  Continue monitor ammonia level  Hepatic encephalopathy likely secondary to medication noncompliance  Wean O2 as tolerated  Neuro checks q.4h    Cirrhosis of liver with ascites related to hepatitis C virus  5/13:  Currently being followed by GI outpatient  Continue monitor  MELD-Na score 10    Orthopedic  Chronic bilateral low back pain without sciatica  5/13:  Daughter reports patient complains of chronic low back pain  Per chart review,  patient has taken pain medications off the street  Will reassess when patient more alert  Problem appear chronic        Critical Care Daily Checklist:    A: Awake: RASS Goal/Actual Goal:    Actual:     B: Spontaneous Breathing Trial Performed?     C: SAT & SBT Coordinated?  yes                      D: Delirium: CAM-ICU     E: Early Mobility Performed? No   F: Feeding Goal:    Status:     Current Diet Order   Procedures    Diet NPO      AS: Analgesia/Sedation propofol   T: Thromboembolic Prophylaxis lovenox   H: HOB > 300 Yes   U: Stress Ulcer Prophylaxis (if needed) pepcid   G: Glucose Control    B: Bowel Function     I: Indwelling Catheter (Lines & Mejia) Necessity periperal IV, mejia   D: De-escalation of Antimicrobials/Pharmacotherapies     Plan for the day/ETD Trend ammonia level, check neuro status, reassess for readiness to extubate    Code Status:  Family/Goals of Care: Full Code  Daughter is surrogate decision maker; Emergency contact listed as spouse however daughter reports contact is her boyfriend     Critical Care Time: 60 minutes  Critical secondary to Patient has a condition that poses threat to life and bodily function: hepatic encephalopathy      Critical care was time spent personally by me on the following activities: development of treatment plan with patient or surrogate and bedside caregivers, discussions with consultants, evaluation of patient's response to treatment, examination of patient, ordering and performing treatments and interventions, ordering and review of laboratory studies, ordering and review of radiographic studies, pulse oximetry, re-evaluation of patient's condition. This critical care time did not overlap with that of any other provider or involve time for any procedures.     Rosendo Sharpe MD  Critical Care Medicine  [facility]

## 2020-05-13 NOTE — PLAN OF CARE
Recommendations    Recommendation:  1. Recommend continuous NG feeds of Nutren 1.5 @ 50 mL/hr. Flushes per MD/NP. (inc 48 kcal from propofol, provides 1848 kcal, 72 g protein, 930 mL free water)  2. Weekly weights  3. RD to f/u  Goals: % of EN needs at time of f/u  Nutrition Goal Status: new  Communication of RD Recs: POC, Sticky note    Maura Field RD LDN

## 2020-05-13 NOTE — ASSESSMENT & PLAN NOTE
5/13:  Daughter reports patient complains of chronic low back pain  Per chart review, patient has taken pain medications off the street  Will reassess when patient more alert  Problem appear chronic

## 2020-05-13 NOTE — ASSESSMENT & PLAN NOTE
5/13:  UDS positive for cocaine  Prior admissions UDS was positive for amphetamines  Will counseled on drug cessation once patient more alert

## 2020-05-13 NOTE — ED NOTES
Patient lying in bed. Responds with moans to vigorous sternal rub. Skin is warm and dry. Side rails up x 2, call light in reach, bed low and locked. Will continue to monitor.

## 2020-05-14 PROBLEM — J18.9 PNEUMONIA, UNSPECIFIED ORGANISM: Status: ACTIVE | Noted: 2020-05-14

## 2020-05-14 LAB
ALLENS TEST: ABNORMAL
AMMONIA PLAS-SCNC: 89 UMOL/L (ref 10–50)
ANION GAP SERPL CALC-SCNC: 10 MMOL/L (ref 8–16)
ANION GAP SERPL CALC-SCNC: 15 MMOL/L (ref 8–16)
BASOPHILS # BLD AUTO: 0.03 K/UL (ref 0–0.2)
BASOPHILS NFR BLD: 0.3 % (ref 0–1.9)
BUN SERPL-MCNC: 15 MG/DL (ref 8–23)
BUN SERPL-MCNC: 18 MG/DL (ref 8–23)
CALCIUM SERPL-MCNC: 8 MG/DL (ref 8.7–10.5)
CALCIUM SERPL-MCNC: 8.3 MG/DL (ref 8.7–10.5)
CHLORIDE SERPL-SCNC: 112 MMOL/L (ref 95–110)
CHLORIDE SERPL-SCNC: 113 MMOL/L (ref 95–110)
CO2 SERPL-SCNC: 14 MMOL/L (ref 23–29)
CO2 SERPL-SCNC: 15 MMOL/L (ref 23–29)
CREAT SERPL-MCNC: 1.3 MG/DL (ref 0.5–1.4)
CREAT SERPL-MCNC: 1.3 MG/DL (ref 0.5–1.4)
DELSYS: ABNORMAL
DIFFERENTIAL METHOD: ABNORMAL
EOSINOPHIL # BLD AUTO: 0.1 K/UL (ref 0–0.5)
EOSINOPHIL NFR BLD: 0.6 % (ref 0–8)
ERYTHROCYTE [DISTWIDTH] IN BLOOD BY AUTOMATED COUNT: 16.7 % (ref 11.5–14.5)
ERYTHROCYTE [SEDIMENTATION RATE] IN BLOOD BY WESTERGREN METHOD: 14 MM/H
EST. GFR  (AFRICAN AMERICAN): 51 ML/MIN/1.73 M^2
EST. GFR  (AFRICAN AMERICAN): 51 ML/MIN/1.73 M^2
EST. GFR  (NON AFRICAN AMERICAN): 44 ML/MIN/1.73 M^2
EST. GFR  (NON AFRICAN AMERICAN): 44 ML/MIN/1.73 M^2
FIO2: 30
GLUCOSE SERPL-MCNC: 108 MG/DL (ref 70–110)
GLUCOSE SERPL-MCNC: 126 MG/DL (ref 70–110)
HCO3 UR-SCNC: 18.7 MMOL/L (ref 24–28)
HCT VFR BLD AUTO: 32.6 % (ref 37–48.5)
HGB BLD-MCNC: 10.6 G/DL (ref 12–16)
IMM GRANULOCYTES # BLD AUTO: 0.08 K/UL (ref 0–0.04)
IMM GRANULOCYTES NFR BLD AUTO: 0.9 % (ref 0–0.5)
LYMPHOCYTES # BLD AUTO: 0.8 K/UL (ref 1–4.8)
LYMPHOCYTES NFR BLD: 8.5 % (ref 18–48)
MAGNESIUM SERPL-MCNC: 1.9 MG/DL (ref 1.6–2.6)
MAGNESIUM SERPL-MCNC: 1.9 MG/DL (ref 1.6–2.6)
MCH RBC QN AUTO: 32.1 PG (ref 27–31)
MCHC RBC AUTO-ENTMCNC: 32.5 G/DL (ref 32–36)
MCV RBC AUTO: 99 FL (ref 82–98)
MODE: ABNORMAL
MONOCYTES # BLD AUTO: 1.4 K/UL (ref 0.3–1)
MONOCYTES NFR BLD: 15.2 % (ref 4–15)
NEUTROPHILS # BLD AUTO: 7 K/UL (ref 1.8–7.7)
NEUTROPHILS NFR BLD: 74.5 % (ref 38–73)
NRBC BLD-RTO: 0 /100 WBC
PCO2 BLDA: 28.4 MMHG (ref 35–45)
PEEP: 5
PH SMN: 7.43 [PH] (ref 7.35–7.45)
PLATELET # BLD AUTO: 111 K/UL (ref 150–350)
PMV BLD AUTO: 11.8 FL (ref 9.2–12.9)
PO2 BLDA: 104 MMHG (ref 80–100)
POC BE: -6 MMOL/L
POC SATURATED O2: 98 % (ref 95–100)
POTASSIUM SERPL-SCNC: 3 MMOL/L (ref 3.5–5.1)
POTASSIUM SERPL-SCNC: 3.8 MMOL/L (ref 3.5–5.1)
PROCALCITONIN SERPL IA-MCNC: 0.11 NG/ML
PS: 5
RBC # BLD AUTO: 3.3 M/UL (ref 4–5.4)
SAMPLE: ABNORMAL
SITE: ABNORMAL
SODIUM SERPL-SCNC: 137 MMOL/L (ref 136–145)
SODIUM SERPL-SCNC: 142 MMOL/L (ref 136–145)
VT: 400
WBC # BLD AUTO: 9.4 K/UL (ref 3.9–12.7)

## 2020-05-14 PROCEDURE — 20000000 HC ICU ROOM

## 2020-05-14 PROCEDURE — 82803 BLOOD GASES ANY COMBINATION: CPT

## 2020-05-14 PROCEDURE — 99291 CRITICAL CARE FIRST HOUR: CPT | Mod: ,,, | Performed by: INTERNAL MEDICINE

## 2020-05-14 PROCEDURE — 36600 WITHDRAWAL OF ARTERIAL BLOOD: CPT

## 2020-05-14 PROCEDURE — 63700000 PHARM REV CODE 250 ALT 637 W/O HCPCS: Performed by: FAMILY MEDICINE

## 2020-05-14 PROCEDURE — 94760 N-INVAS EAR/PLS OXIMETRY 1: CPT

## 2020-05-14 PROCEDURE — S0030 INJECTION, METRONIDAZOLE: HCPCS | Performed by: INTERNAL MEDICINE

## 2020-05-14 PROCEDURE — 87070 CULTURE OTHR SPECIMN AEROBIC: CPT

## 2020-05-14 PROCEDURE — 80048 BASIC METABOLIC PNL TOTAL CA: CPT | Mod: 91

## 2020-05-14 PROCEDURE — 25000003 PHARM REV CODE 250: Performed by: INTERNAL MEDICINE

## 2020-05-14 PROCEDURE — S0028 INJECTION, FAMOTIDINE, 20 MG: HCPCS | Performed by: FAMILY MEDICINE

## 2020-05-14 PROCEDURE — 82140 ASSAY OF AMMONIA: CPT

## 2020-05-14 PROCEDURE — 87040 BLOOD CULTURE FOR BACTERIA: CPT | Mod: 59

## 2020-05-14 PROCEDURE — 87205 SMEAR GRAM STAIN: CPT

## 2020-05-14 PROCEDURE — 84145 PROCALCITONIN (PCT): CPT

## 2020-05-14 PROCEDURE — 85025 COMPLETE CBC W/AUTO DIFF WBC: CPT

## 2020-05-14 PROCEDURE — 36415 COLL VENOUS BLD VENIPUNCTURE: CPT

## 2020-05-14 PROCEDURE — 99900035 HC TECH TIME PER 15 MIN (STAT)

## 2020-05-14 PROCEDURE — 99291 PR CRITICAL CARE, E/M 30-74 MINUTES: ICD-10-PCS | Mod: ,,, | Performed by: INTERNAL MEDICINE

## 2020-05-14 PROCEDURE — 80048 BASIC METABOLIC PNL TOTAL CA: CPT

## 2020-05-14 PROCEDURE — 63600175 PHARM REV CODE 636 W HCPCS: Performed by: FAMILY MEDICINE

## 2020-05-14 PROCEDURE — 25000003 PHARM REV CODE 250: Performed by: FAMILY MEDICINE

## 2020-05-14 PROCEDURE — 83735 ASSAY OF MAGNESIUM: CPT

## 2020-05-14 PROCEDURE — 94003 VENT MGMT INPAT SUBQ DAY: CPT

## 2020-05-14 RX ORDER — FAMOTIDINE 10 MG/ML
20 INJECTION INTRAVENOUS DAILY
Status: DISCONTINUED | OUTPATIENT
Start: 2020-05-14 | End: 2020-05-16

## 2020-05-14 RX ORDER — VANCOMYCIN HCL IN 5 % DEXTROSE 1.5G/250ML
1500 PLASTIC BAG, INJECTION (ML) INTRAVENOUS ONCE
Status: COMPLETED | OUTPATIENT
Start: 2020-05-14 | End: 2020-05-14

## 2020-05-14 RX ORDER — METRONIDAZOLE 500 MG/100ML
500 INJECTION, SOLUTION INTRAVENOUS
Status: DISCONTINUED | OUTPATIENT
Start: 2020-05-14 | End: 2020-05-15

## 2020-05-14 RX ORDER — VANCOMYCIN HCL IN 5 % DEXTROSE 1G/250ML
1000 PLASTIC BAG, INJECTION (ML) INTRAVENOUS
Status: DISCONTINUED | OUTPATIENT
Start: 2020-05-15 | End: 2020-05-14

## 2020-05-14 RX ORDER — ACETAMINOPHEN 650 MG/20.3ML
650 LIQUID ORAL EVERY 8 HOURS PRN
Status: DISCONTINUED | OUTPATIENT
Start: 2020-05-14 | End: 2020-05-15

## 2020-05-14 RX ORDER — POTASSIUM CHLORIDE 20 MEQ/15ML
40 SOLUTION ORAL EVERY 4 HOURS
Status: COMPLETED | OUTPATIENT
Start: 2020-05-14 | End: 2020-05-14

## 2020-05-14 RX ADMIN — FUROSEMIDE 40 MG: 40 TABLET ORAL at 08:05

## 2020-05-14 RX ADMIN — METRONIDAZOLE 500 MG: 500 INJECTION, SOLUTION INTRAVENOUS at 11:05

## 2020-05-14 RX ADMIN — LACTULOSE 30 G: 10 SOLUTION ORAL at 08:05

## 2020-05-14 RX ADMIN — FUROSEMIDE 40 MG: 40 TABLET ORAL at 09:05

## 2020-05-14 RX ADMIN — FAMOTIDINE 20 MG: 10 INJECTION INTRAVENOUS at 08:05

## 2020-05-14 RX ADMIN — ACETAMINOPHEN 650 MG: 160 SOLUTION ORAL at 12:05

## 2020-05-14 RX ADMIN — CEFEPIME 2 G: 2 INJECTION, POWDER, FOR SOLUTION INTRAVENOUS at 12:05

## 2020-05-14 RX ADMIN — LACTULOSE 30 G: 10 SOLUTION ORAL at 03:05

## 2020-05-14 RX ADMIN — PROPOFOL 30 MCG/KG/MIN: 10 INJECTION, EMULSION INTRAVENOUS at 05:05

## 2020-05-14 RX ADMIN — VANCOMYCIN HYDROCHLORIDE 1500 MG: 100 INJECTION, POWDER, LYOPHILIZED, FOR SOLUTION INTRAVENOUS at 12:05

## 2020-05-14 RX ADMIN — METOPROLOL TARTRATE 25 MG: 25 TABLET, FILM COATED ORAL at 08:05

## 2020-05-14 RX ADMIN — METRONIDAZOLE 500 MG: 500 INJECTION, SOLUTION INTRAVENOUS at 08:05

## 2020-05-14 RX ADMIN — CEFEPIME 2 G: 2 INJECTION, POWDER, FOR SOLUTION INTRAVENOUS at 01:05

## 2020-05-14 RX ADMIN — METOPROLOL TARTRATE 25 MG: 25 TABLET, FILM COATED ORAL at 09:05

## 2020-05-14 RX ADMIN — POTASSIUM CHLORIDE 40 MEQ: 20 SOLUTION ORAL at 10:05

## 2020-05-14 RX ADMIN — CHLORHEXIDINE GLUCONATE 0.12% ORAL RINSE 15 ML: 1.2 LIQUID ORAL at 09:05

## 2020-05-14 RX ADMIN — ENOXAPARIN SODIUM 40 MG: 100 INJECTION SUBCUTANEOUS at 05:05

## 2020-05-14 RX ADMIN — CHLORHEXIDINE GLUCONATE 0.12% ORAL RINSE 15 ML: 1.2 LIQUID ORAL at 08:05

## 2020-05-14 RX ADMIN — POTASSIUM CHLORIDE 40 MEQ: 20 SOLUTION ORAL at 05:05

## 2020-05-14 RX ADMIN — PROPOFOL 30 MCG/KG/MIN: 10 INJECTION, EMULSION INTRAVENOUS at 08:05

## 2020-05-14 RX ADMIN — CAROSPIR 100 MG: 25 SUSPENSION ORAL at 08:05

## 2020-05-14 RX ADMIN — LACTULOSE 30 G: 10 SOLUTION ORAL at 09:05

## 2020-05-14 NOTE — ASSESSMENT & PLAN NOTE
5/13:  Currently being followed by GI outpatient  Continue monitor  MELD-Na score 10  5/14 needing frequent paracentesis , last early may

## 2020-05-14 NOTE — PLAN OF CARE
Patient remains intubated and sedated. VSS except TMAX 102.8. Ice packs placed on patient and PRN Acetaminophen administered for full relief. Sedation increased to promote ventilator synchronization. Propofol continues to infuse. Patient continues to perform decorticate posturing of BUE and BLE to painful stimuli. Patient is positive for cough, gag and PERRLA. Patient had several loose bowel movements throughout shift. Flexi-Seal placed.Patient continues to void per mejia in place with 20-30 ml/hr. BC's drawn and Respiratory culture obtained. Patient started on Cefepime and Vancomycin. K- 3.0 and replaced with 40 mEq of K via OG tube per Annemarie MONTENEGRO's orders. POC reviewed with patient's daughter. Daughter verbalized understanding. Will continue to monitor.

## 2020-05-14 NOTE — HOSPITAL COURSE
5/14 seen and examined, intubated , sedated with propofol gtt , Afebrile , CXR , ABG reviewed , T max 102.8 , cultures negative   5/15 - Upright in bed intubated on mech ventilation in no distress.  Stopped Propofol infusion and passes SBT still lethargic.  Tolerated extubation and VSS  5/16 - Fully awake, alert, responsive and with orientation X 3.  Upright in bed tolerating diet.  Tolerated extubation yesterday.  No distress and VSS.  Periods of confusion noted this AM at 0530 hr standing upright at bedside naked.

## 2020-05-14 NOTE — PROGRESS NOTES
Pharmacokinetic Initial Assessment: IV Vancomycin    Assessment/Plan:    Initiate intravenous vancomycin with loading dose of 1500 mg once followed by a maintenance dose of vancomycin 1000 mg IV every 24 hours  Desired empiric serum trough concentration is 15 to 20 mcg/mL  Recent lab history shows SCr averaging 0.9 and she is 1.1 currently. When this improves pharmacy will adjust the dosing interval and dose possibly.     Draw vancomycin trough level 30 min prior to third dose on 5/16 at approximately 0100 (midnight 0000)  Pharmacy will continue to follow and monitor vancomycin.      Please contact pharmacy at extension [phone] with any questions regarding this assessment.     Thank you for the consult,   Erick Garrido       Patient brief summary:  Jossy Siegel is a 59 y.o. female initiated on antimicrobial therapy with IV Vancomycin for treatment of suspected sepsis, possible PNA    Drug Allergies:   Review of patient's allergies indicates:  No Known Allergies    Actual Body Weight:   60.3 kg    Renal Function:   Estimated Creatinine Clearance: 46 mL/min (based on SCr of 1.1 mg/dL).,     Dialysis Method (if applicable):  N/A    CBC (last 72 hours):  Recent Labs   Lab Result Units 05/12/20  2344   WBC K/uL 3.35*   Hemoglobin g/dL 9.7*   Hematocrit % 29.8*   Platelets K/uL 95*   Gran% % 63.9   Lymph% % 16.4*   Mono% % 16.1*   Eosinophil% % 1.8   Basophil% % 0.6   Differential Method  Automated       Metabolic Panel (last 72 hours):  Recent Labs   Lab Result Units 05/11/20  1330 05/12/20  2344 05/13/20  0004 05/13/20  0930   Sodium mmol/L 138 139  --  142   Potassium mmol/L 3.5 4.2  --  3.5   Chloride mmol/L 106 109  --  111*   CO2 mmol/L 21* 20*  --  18*   Glucose mg/dL 102 121*  --  98   Glucose, UA   --   --  Negative  --    BUN, Bld mg/dL 13 13  --  14   Creatinine mg/dL 1.1 1.0  --  1.1   Creatinine, Random Ur mg/dL  --   --  65.6  --    Albumin g/dL 2.6* 2.4*  --  2.3*   Total Bilirubin mg/dL 1.5*  1.1*  --  2.1*   Alkaline Phosphatase U/L 190* 192*  --  164*   AST U/L 46* 50*  --  51*   ALT U/L 34 30  --  29   Magnesium mg/dL  --   --   --  1.7   Phosphorus mg/dL  --   --   --  2.5*       Drug levels (last 3 results):  No results for input(s): VANCOMYCINRA, VANCOMYCINPE, VANCOMYCINTR in the last 72 hours.    Microbiologic Results:  Microbiology Results (last 7 days)       Procedure Component Value Units Date/Time    Blood culture [[id number]] Collected:  05/14/20 0023    Order Status:  Sent Specimen:  Blood Updated:  05/14/20 0024    Blood culture [[id number]] Collected:  05/14/20 0015    Order Status:  Sent Specimen:  Blood Updated:  05/14/20 0024    Culture, Respiratory with Gram Stain [[id number]]     Order Status:  No result Specimen:  Respiratory

## 2020-05-14 NOTE — ASSESSMENT & PLAN NOTE
5/13:  UDS positive for cocaine  Prior admissions UDS was positive for amphetamines  Will counseled on drug cessation once patient more alert  5/14 same

## 2020-05-14 NOTE — PROGRESS NOTES
Pharmacist Renal Dose Adjustment Note    Jossy Siegel is a 59 y.o. female being treated with the medication Pepcid    Patient Data:    Vital Signs (Most Recent):  Temp: 98.3 °F (36.8 °C) (05/14/20 0705)  Pulse: 92 (05/14/20 0705)  Resp: 14 (05/14/20 0705)  BP: 139/67 (05/14/20 0705)  SpO2: 100 % (05/14/20 0705) Vital Signs (72h Range):  Temp:  [97.8 °F (36.6 °C)-102.8 °F (39.3 °C)]   Pulse:  []   Resp:  [13-36]   BP: ()/(52-91)   SpO2:  [96 %-100 %]      Recent Labs   Lab 05/12/20  2344 05/13/20  0930 05/14/20  0023   CREATININE 1.0 1.1 1.3     Serum creatinine: 1.3 mg/dL 05/14/20 0023  Estimated creatinine clearance: 38.9 mL/min    Medication:Pepcid dose: 20mg frequency BID will be changed to medication:Pepcid dose:20mg frequency:daily    Pharmacist's Name: JONATHAN KU  Pharmacist's Extension: 1840

## 2020-05-14 NOTE — PROGRESS NOTES
[facility] -   Critical Care Medicine  Progress Note    Patient Name: Jossy Siegel  MRN: [id number]  Admission Date: 5/12/2020  Hospital Length of Stay: 1 days  Code Status: Full Code  Attending Provider: Rosendo Sharpe MD  Primary Care Provider: Primary Doctor No   Principal Problem: Hepatic coma/encephalopathy    Subjective:     HPI:  Patient is a 59-year-old  female with a past medical history of liver cirrhosis, hep C, drug abuse, medication noncompliance presents to the ED due to lethargy.  Patient currently intubated therefore history is limited.  After discussing with emergency contact, it was made known that patient was staying with a friend.  It was reported that EMS found patient lethargic and minimally responsive on the scene.  Upon arrival to the ED, there was concern on patient's ability to protect her airway and she was intubated.  Of note patient was recently discharged on 05/01 after being admitted for hepatic encephalopathy.  Patient follow-up with GI in clinic on 05/11.  Patient is currently full code and surrogate decision maker is her daughter.  After speaking with daughter, it was clarified that emergency contact is patient's boyfriend not spouse.     In the ED, patient was found to have an ammonia level 372.  GI was made aware of the case and lactulose was to be given 30 mg every hour for 3 hrs and then t.i.d. afterwards.  UDS was positive for cocaine. Patient was admitted to ICU.    Hospital/ICU Course:  5/14 seen and examined, intubated , sedated with propofol gtt , Afebrile , CXR , ABG reviewed , T max 102.8 , cultures negative     Interval History:     5/14 seen and examined, intubated , sedated with propofol gtt , Afebrile , CXR , ABG reviewed , T max 102.8 , cultures negative .     Review of Systems   Unable to perform ROS: Intubated     Objective:     Vital Signs (Most Recent):  Temp: 98.3 °F (36.8 °C) (05/14/20 0705)  Pulse: 75 (05/14/20 1005)  Resp: 15  (05/14/20 1005)  BP: 124/64 (05/14/20 1005)  SpO2: 100 % (05/14/20 1005) Vital Signs (24h Range):  Temp:  [98.3 °F (36.8 °C)-102.8 °F (39.3 °C)] 98.3 °F (36.8 °C)  Pulse:  [] 75  Resp:  [13-29] 15  SpO2:  [96 %-100 %] 100 %  BP: (104-176)/(53-91) 124/64     Weight: 60.3 kg (133 lb)  Body mass index is 24.33 kg/m².      Intake/Output Summary (Last 24 hours) at 5/14/2020 1101  Last data filed at 5/14/2020 1005  Gross per 24 hour   Intake 607.4 ml   Output 865 ml   Net -257.6 ml       Physical Exam   Constitutional: She appears well-developed and well-nourished. No distress.   HENT:   Head: Normocephalic and atraumatic.   Eyes: Conjunctivae are normal. Right eye exhibits no discharge. Left eye exhibits no discharge.   Cardiovascular: Normal rate, regular rhythm and normal heart sounds. Exam reveals no gallop and no friction rub.   No murmur heard.  Pulmonary/Chest: Effort normal and breath sounds normal. No stridor. No respiratory distress. She has no wheezes. She has no rales.   Intubated   Abdominal: Soft. Bowel sounds are normal. She exhibits distension. She exhibits no mass. There is no tenderness. There is no guarding.   Musculoskeletal: She exhibits no edema.   Neurological:   Sedated   Skin: Skin is warm. She is not diaphoretic. No erythema.       Vents:  Vent Mode: SIMV (05/14/20 0938)  Ventilator Initiated: Yes (05/13/20 0309)  Set Rate: 14 BPM (05/14/20 0938)  Vt Set: 400 mL (05/14/20 0938)  Pressure Support: 5 cmH20 (05/14/20 0938)  PEEP/CPAP: 5 cmH20 (05/14/20 0938)  Oxygen Concentration (%): 30 (05/14/20 1005)  Peak Airway Pressure: 11 cmH2O (05/14/20 0938)  Plateau Pressure: 0 cmH20 (05/14/20 0938)  Total Ve: 7.01 mL (05/14/20 0938)  F/VT Ratio<105 (RSBI): (!) 33.33 (05/14/20 0938)    Lines/Drains/Airways     Drain                 NG/OG Tube 05/13/20 0315 16 Fr. Right mouth 1 day         Urethral Catheter 05/13/20 0329 Latex 16 Fr. 1 day         Rectal Tube 05/14/20 0030 rectal tube w/ balloon  (indicate number of mLs) less than 1 day          Airway                 Airway - Non-Surgical 05/13/20 0305 Endotracheal Tube 1 day          Peripheral Intravenous Line                 Peripheral IV - Single Lumen 05/12/20 2342 20 G Right Hand 1 day         Peripheral IV - Single Lumen 05/14/20 0230 18 G Posterior;Left Hand less than 1 day                Significant Labs:    CBC/Anemia Profile:  Recent Labs   Lab 05/12/20  2344 05/14/20  0023   WBC 3.35* 9.40   HGB 9.7* 10.6*   HCT 29.8* 32.6*   PLT 95* 111*   MCV 97 99*   RDW 15.9* 16.7*        Chemistries:  Recent Labs   Lab 05/12/20 2344 05/13/20  0930 05/14/20  0023 05/14/20  0429 05/14/20  0634    142 142  --  137   K 4.2 3.5 3.0*  --  3.8    111* 113*  --  112*   CO2 20* 18* 14*  --  15*   BUN 13 14 15  --  18   CREATININE 1.0 1.1 1.3  --  1.3   CALCIUM 7.8* 7.8* 8.0*  --  8.3*   ALBUMIN 2.4* 2.3*  --   --   --    PROT 6.9 6.8  --   --   --    BILITOT 1.1* 2.1*  --   --   --    ALKPHOS 192* 164*  --   --   --    ALT 30 29  --   --   --    AST 50* 51*  --   --   --    MG  --  1.7  --  1.9 1.9   PHOS  --  2.5*  --   --   --        ABGs:   Recent Labs   Lab 05/14/20  0451   PH 7.428   PCO2 28.4*   HCO3 18.7*   POCSATURATED 98   BE -6     Results for GINA CHOI (MRN [id number]) as of 5/14/2020 10:56   Ref. Range 5/14/2020 04:51   POC PH Latest Ref Range: 7.35 - 7.45  7.428   POC PCO2 Latest Ref Range: 35 - 45 mmHg 28.4 (LL)   POC PO2 Latest Ref Range: 80 - 100 mmHg 104 (H)   POC BE Latest Ref Range: -2 to 2 mmol/L -6   POC HCO3 Latest Ref Range: 24 - 28 mmol/L 18.7 (L)   POC SATURATED O2 Latest Ref Range: 95 - 100 % 98   FiO2 Unknown 30   Vt Unknown 400   PEEP Unknown 5   Sample Unknown ARTERIAL   DelSys Unknown Adult Vent   Allens Test Unknown Pass   Site Unknown RR   Mode Unknown SIMV     Results for GINA CHOI (MRN [id number]) as of 5/14/2020 10:56   Ref. Range 5/12/2020 23:44 5/13/2020 09:30 5/14/2020 00:23 5/14/2020  04:29 5/14/2020 06:34 5/14/2020 08:27   Ammonia Latest Ref Range: 10 - 50 umol/L 372 (H) 144 (H)    89 (H)       Significant Imaging:  CXR: I have reviewed all pertinent results/findings within the past 24 hours and my personal findings are:  An endotracheal tube remains in place.  There is partial visualization of an NG tube.  Its tip is not included on the film.  The size of the heart is normal.  There is a streaky opacity in the lateral aspect of the base of the left lung.  The right lung is clear.  There is no pneumothorax.  The costophrenic angles are  sharp      ABG  Recent Labs   Lab 05/14/20  0451   PH 7.428   PO2 104*   PCO2 28.4*   HCO3 18.7*   BE -6     Assessment/Plan:     Psychiatric  Drug abuse  5/13:  UDS positive for cocaine  Prior admissions UDS was positive for amphetamines  Will counseled on drug cessation once patient more alert  5/14 same     Pulmonary  Pneumonia, unspecified organism  5/14 await cx , Add flagyl to cefepime , Tmax 102.8 overnight     GI  * Hepatic coma/encephalopathy  5/13:  30 mg lactulose q.1h x3  Will continue with 30 mg lactulose t.i.d.  Continue monitor ammonia level  Hepatic encephalopathy likely secondary to medication noncompliance  Wean O2 as tolerated  Neuro checks q.4h  5/14 continue po lactulose , monitor ammonia level     Cirrhosis of liver with ascites related to hepatitis C virus  5/13:  Currently being followed by GI outpatient  Continue monitor  MELD-Na score 10  5/14 needing frequent paracentesis , last early may     Orthopedic  Chronic bilateral low back pain without sciatica  5/13:  Daughter reports patient complains of chronic low back pain  Per chart review, patient has taken pain medications off the street  Will reassess when patient more alert  Problem appear chronic       Critical Care Daily Checklist:    A: Awake: RASS Goal/Actual Goal: RASS Goal: -2-->light sedation  Actual: Real Agitation Sedation Scale (RASS): Deep sedation   B: Spontaneous  Breathing Trial Performed?     C: SAT & SBT Coordinated?  Y                      D: Delirium: CAM-ICU     E: Early Mobility Performed? No   F: Feeding Goal: Goals: .  Status: Nutrition Goal Status: new   Current Diet Order   Procedures    Diet NPO      AS: Analgesia/Sedation Propofol gtt    T: Thromboembolic Prophylaxis Lovenox sc    H: HOB > 300 Yes   U: Stress Ulcer Prophylaxis (if needed) Famotidine    G: Glucose Control Fs prn    B: Bowel Function     I: Indwelling Catheter (Lines & House) Necessity Y   D: De-escalation of Antimicrobials/Pharmacotherapies Cefepime + flagyl     Plan for the day/ETD Y    Code Status:  Family/Goals of Care: Full Code  Spoke to patient daughter , discussed at length , pt condition , hospital course and prognosis , she will have discussion with family and decide on code status      Critical Care Time: 40 minutes  Critical secondary to Patient has a condition that poses threat to life and bodily function: resp failure / pnemonia, advanced liver cirrhosis       Critical care was time spent personally by me on the following activities: development of treatment plan with patient or surrogate and bedside caregivers, discussions with consultants, evaluation of patient's response to treatment, examination of patient, ordering and performing treatments and interventions, ordering and review of laboratory studies, ordering and review of radiographic studies, pulse oximetry, re-evaluation of patient's condition. This critical care time did not overlap with that of any other provider or involve time for any procedures.     Sonya Lau MD  Critical Care Medicine  [facility] -

## 2020-05-14 NOTE — ASSESSMENT & PLAN NOTE
5/13:  30 mg lactulose q.1h x3  Will continue with 30 mg lactulose t.i.d.  Continue monitor ammonia level  Hepatic encephalopathy likely secondary to medication noncompliance  Wean O2 as tolerated  Neuro checks q.4h  5/14 continue po lactulose , monitor ammonia level

## 2020-05-14 NOTE — SIGNIFICANT EVENT
Patient febrile, tachycardic. Covid-19 testing negative. Possible LLL infiltrate on chest xray. Will treat for sepsis secondary to pna. Start vanc and cefepime. Blood/sputum cultures ordered. Procalcitonin added. Tylenol added for temperature. Can tolerate max 2-3g daily.

## 2020-05-14 NOTE — SUBJECTIVE & OBJECTIVE
Interval History:     5/14 seen and examined, intubated , sedated with propofol gtt , Afebrile , CXR , ABG reviewed , T max 102.8 , cultures negative .     Review of Systems   Unable to perform ROS: Intubated     Objective:     Vital Signs (Most Recent):  Temp: 98.3 °F (36.8 °C) (05/14/20 0705)  Pulse: 75 (05/14/20 1005)  Resp: 15 (05/14/20 1005)  BP: 124/64 (05/14/20 1005)  SpO2: 100 % (05/14/20 1005) Vital Signs (24h Range):  Temp:  [98.3 °F (36.8 °C)-102.8 °F (39.3 °C)] 98.3 °F (36.8 °C)  Pulse:  [] 75  Resp:  [13-29] 15  SpO2:  [96 %-100 %] 100 %  BP: (104-176)/(53-91) 124/64     Weight: 60.3 kg (133 lb)  Body mass index is 24.33 kg/m².      Intake/Output Summary (Last 24 hours) at 5/14/2020 1101  Last data filed at 5/14/2020 1005  Gross per 24 hour   Intake 607.4 ml   Output 865 ml   Net -257.6 ml       Physical Exam   Constitutional: She appears well-developed and well-nourished. No distress.   HENT:   Head: Normocephalic and atraumatic.   Eyes: Conjunctivae are normal. Right eye exhibits no discharge. Left eye exhibits no discharge.   Cardiovascular: Normal rate, regular rhythm and normal heart sounds. Exam reveals no gallop and no friction rub.   No murmur heard.  Pulmonary/Chest: Effort normal and breath sounds normal. No stridor. No respiratory distress. She has no wheezes. She has no rales.   Intubated   Abdominal: Soft. Bowel sounds are normal. She exhibits distension. She exhibits no mass. There is no tenderness. There is no guarding.   Musculoskeletal: She exhibits no edema.   Neurological:   Sedated   Skin: Skin is warm. She is not diaphoretic. No erythema.       Vents:  Vent Mode: SIMV (05/14/20 0916)  Ventilator Initiated: Yes (05/13/20 0309)  Set Rate: 14 BPM (05/14/20 0938)  Vt Set: 400 mL (05/14/20 0938)  Pressure Support: 5 cmH20 (05/14/20 0938)  PEEP/CPAP: 5 cmH20 (05/14/20 0938)  Oxygen Concentration (%): 30 (05/14/20 1005)  Peak Airway Pressure: 11 cmH2O (05/14/20 0938)  Plateau  Pressure: 0 cmH20 (05/14/20 0938)  Total Ve: 7.01 mL (05/14/20 0938)  F/VT Ratio<105 (RSBI): (!) 33.33 (05/14/20 0938)    Lines/Drains/Airways     Drain                 NG/OG Tube 05/13/20 0315 16 Fr. Right mouth 1 day         Urethral Catheter 05/13/20 0329 Latex 16 Fr. 1 day         Rectal Tube 05/14/20 0030 rectal tube w/ balloon (indicate number of mLs) less than 1 day          Airway                 Airway - Non-Surgical 05/13/20 0305 Endotracheal Tube 1 day          Peripheral Intravenous Line                 Peripheral IV - Single Lumen 05/12/20 2342 20 G Right Hand 1 day         Peripheral IV - Single Lumen 05/14/20 0230 18 G Posterior;Left Hand less than 1 day                Significant Labs:    CBC/Anemia Profile:  Recent Labs   Lab 05/12/20 2344 05/14/20  0023   WBC 3.35* 9.40   HGB 9.7* 10.6*   HCT 29.8* 32.6*   PLT 95* 111*   MCV 97 99*   RDW 15.9* 16.7*        Chemistries:  Recent Labs   Lab 05/12/20 2344 05/13/20  0930 05/14/20  0023 05/14/20  0429 05/14/20  0634    142 142  --  137   K 4.2 3.5 3.0*  --  3.8    111* 113*  --  112*   CO2 20* 18* 14*  --  15*   BUN 13 14 15  --  18   CREATININE 1.0 1.1 1.3  --  1.3   CALCIUM 7.8* 7.8* 8.0*  --  8.3*   ALBUMIN 2.4* 2.3*  --   --   --    PROT 6.9 6.8  --   --   --    BILITOT 1.1* 2.1*  --   --   --    ALKPHOS 192* 164*  --   --   --    ALT 30 29  --   --   --    AST 50* 51*  --   --   --    MG  --  1.7  --  1.9 1.9   PHOS  --  2.5*  --   --   --        ABGs:   Recent Labs   Lab 05/14/20  0451   PH 7.428   PCO2 28.4*   HCO3 18.7*   POCSATURATED 98   BE -6     Results for GINA CHOI (MRN [id number]) as of 5/14/2020 10:56   Ref. Range 5/14/2020 04:51   POC PH Latest Ref Range: 7.35 - 7.45  7.428   POC PCO2 Latest Ref Range: 35 - 45 mmHg 28.4 (LL)   POC PO2 Latest Ref Range: 80 - 100 mmHg 104 (H)   POC BE Latest Ref Range: -2 to 2 mmol/L -6   POC HCO3 Latest Ref Range: 24 - 28 mmol/L 18.7 (L)   POC SATURATED O2 Latest Ref Range: 95  - 100 % 98   FiO2 Unknown 30   Vt Unknown 400   PEEP Unknown 5   Sample Unknown ARTERIAL   DelSys Unknown Adult Vent   Allens Test Unknown Pass   Site Unknown RR   Mode Unknown SIMV     Results for GINA CHOI (MRN [id number]) as of 5/14/2020 10:56   Ref. Range 5/12/2020 23:44 5/13/2020 09:30 5/14/2020 00:23 5/14/2020 04:29 5/14/2020 06:34 5/14/2020 08:27   Ammonia Latest Ref Range: 10 - 50 umol/L 372 (H) 144 (H)    89 (H)       Significant Imaging:  CXR: I have reviewed all pertinent results/findings within the past 24 hours and my personal findings are:  An endotracheal tube remains in place.  There is partial visualization of an NG tube.  Its tip is not included on the film.  The size of the heart is normal.  There is a streaky opacity in the lateral aspect of the base of the left lung.  The right lung is clear.  There is no pneumothorax.  The costophrenic angles are  sharp

## 2020-05-14 NOTE — PHYSICIAN QUERY
PT Name: Jossy Siegel  MR #: [id number]     ACUITY OF CONDITION CLARIFICATION      CDS/: Ada Garcia RN, CDS               Contact information: [email or web address]    This form is a permanent document in the medical record.     Query Date: May 14, 2020    By submitting this query, we are merely seeking further clarification of documentation to reflect the severity of illness of your patient. Please utilize your independent clinical judgment when addressing the question(s) below.    The Medical record reflects the following:     Indicators   Supporting Clinical Findings Location in Medical Record   x Documentation of condition GI  Hepatic coma/encephalopathy   Critical Care PN 5/14    x Lab Value(s) ammonia level 372 Critical Care PN 5/14     Radiology Findings     x Treatment/Medication lactulose was to be given 30 mg every hour for 3 hrs and then t.i.d. Afterwards    Neuro checks q.4h  5/14 continue po lactulose , monitor ammonia level    Critical Care PN 5/14       Critical Care PN 5/14    x Other 59-year-old  female with a past medical history of liver cirrhosis, hep C, drug abuse, medication noncompliance presents to the ED due to lethargy...EMS found patient lethargic and minimally responsive on the scene.  Critical Care PN 5/14       Provider, please specify the acuity/chronicity of __Hepatic encephalopathy__:    [   ] Acute   [ x  ] Acute on chronic   [   ] Other, please specify _______________   [   ] Clinically Undetermined         Present on admission (POA) status:   [   ] Yes (Y)                           [   ] Clinically Undetermined (W)  [   ] No (N)                            [   ] Documentation insufficient to determine if condition is POA (U)       Please document in your progress notes daily for the duration of treatment until resolved, and include in your discharge summary.

## 2020-05-15 PROBLEM — A41.9 SEVERE SEPSIS WITH ACUTE ORGAN DYSFUNCTION: Status: ACTIVE | Noted: 2020-05-14

## 2020-05-15 PROBLEM — F19.10 DRUG ABUSE: Chronic | Status: ACTIVE | Noted: 2020-05-13

## 2020-05-15 PROBLEM — F14.10 COCAINE ABUSE: Status: ACTIVE | Noted: 2020-05-13

## 2020-05-15 PROBLEM — Z72.0 TOBACCO ABUSE: Chronic | Status: ACTIVE | Noted: 2020-04-22

## 2020-05-15 PROBLEM — R65.20 SEVERE SEPSIS WITH ACUTE ORGAN DYSFUNCTION: Status: ACTIVE | Noted: 2020-05-14

## 2020-05-15 PROBLEM — Z99.11 ON MECHANICALLY ASSISTED VENTILATION: Status: ACTIVE | Noted: 2020-05-15

## 2020-05-15 LAB
ALBUMIN SERPL BCP-MCNC: 2.3 G/DL (ref 3.5–5.2)
ALLENS TEST: ABNORMAL
ALP SERPL-CCNC: 129 U/L (ref 55–135)
ALT SERPL W/O P-5'-P-CCNC: 27 U/L (ref 10–44)
AMMONIA PLAS-SCNC: 45 UMOL/L (ref 10–50)
ANION GAP SERPL CALC-SCNC: 13 MMOL/L (ref 8–16)
AST SERPL-CCNC: 44 U/L (ref 10–40)
BASOPHILS # BLD AUTO: 0.05 K/UL (ref 0–0.2)
BASOPHILS NFR BLD: 1.2 % (ref 0–1.9)
BILIRUB SERPL-MCNC: 1.5 MG/DL (ref 0.1–1)
BUN SERPL-MCNC: 23 MG/DL (ref 8–23)
CALCIUM SERPL-MCNC: 8.1 MG/DL (ref 8.7–10.5)
CHLORIDE SERPL-SCNC: 117 MMOL/L (ref 95–110)
CO2 SERPL-SCNC: 17 MMOL/L (ref 23–29)
CREAT SERPL-MCNC: 1.4 MG/DL (ref 0.5–1.4)
DELSYS: ABNORMAL
DIFFERENTIAL METHOD: ABNORMAL
EOSINOPHIL # BLD AUTO: 0.2 K/UL (ref 0–0.5)
EOSINOPHIL NFR BLD: 5.4 % (ref 0–8)
ERYTHROCYTE [DISTWIDTH] IN BLOOD BY AUTOMATED COUNT: 16.5 % (ref 11.5–14.5)
ERYTHROCYTE [SEDIMENTATION RATE] IN BLOOD BY WESTERGREN METHOD: 14 MM/H
EST. GFR  (AFRICAN AMERICAN): 47 ML/MIN/1.73 M^2
EST. GFR  (NON AFRICAN AMERICAN): 41 ML/MIN/1.73 M^2
FIO2: 30
GLUCOSE SERPL-MCNC: 92 MG/DL (ref 70–110)
HCO3 UR-SCNC: 20.6 MMOL/L (ref 24–28)
HCT VFR BLD AUTO: 32 % (ref 37–48.5)
HGB BLD-MCNC: 10.3 G/DL (ref 12–16)
IMM GRANULOCYTES # BLD AUTO: 0.06 K/UL (ref 0–0.04)
IMM GRANULOCYTES NFR BLD AUTO: 1.4 % (ref 0–0.5)
LYMPHOCYTES # BLD AUTO: 0.7 K/UL (ref 1–4.8)
LYMPHOCYTES NFR BLD: 17 % (ref 18–48)
MAGNESIUM SERPL-MCNC: 1.9 MG/DL (ref 1.6–2.6)
MCH RBC QN AUTO: 32.5 PG (ref 27–31)
MCHC RBC AUTO-ENTMCNC: 32.2 G/DL (ref 32–36)
MCV RBC AUTO: 101 FL (ref 82–98)
MODE: ABNORMAL
MONOCYTES # BLD AUTO: 0.8 K/UL (ref 0.3–1)
MONOCYTES NFR BLD: 18.6 % (ref 4–15)
NEUTROPHILS # BLD AUTO: 2.4 K/UL (ref 1.8–7.7)
NEUTROPHILS NFR BLD: 56.4 % (ref 38–73)
NRBC BLD-RTO: 0 /100 WBC
PCO2 BLDA: 31.5 MMHG (ref 35–45)
PEEP: 5
PH SMN: 7.42 [PH] (ref 7.35–7.45)
PLATELET # BLD AUTO: 105 K/UL (ref 150–350)
PMV BLD AUTO: 11.5 FL (ref 9.2–12.9)
PO2 BLDA: 112 MMHG (ref 80–100)
POC BE: -4 MMOL/L
POC SATURATED O2: 99 % (ref 95–100)
POTASSIUM SERPL-SCNC: 3.7 MMOL/L (ref 3.5–5.1)
PROT SERPL-MCNC: 7 G/DL (ref 6–8.4)
PS: 5
RBC # BLD AUTO: 3.17 M/UL (ref 4–5.4)
SAMPLE: ABNORMAL
SITE: ABNORMAL
SODIUM SERPL-SCNC: 147 MMOL/L (ref 136–145)
TRIGL SERPL-MCNC: 92 MG/DL (ref 30–150)
VANCOMYCIN SERPL-MCNC: 11.1 UG/ML
VT: 400
WBC # BLD AUTO: 4.24 K/UL (ref 3.9–12.7)

## 2020-05-15 PROCEDURE — 83735 ASSAY OF MAGNESIUM: CPT

## 2020-05-15 PROCEDURE — 25000003 PHARM REV CODE 250: Performed by: INTERNAL MEDICINE

## 2020-05-15 PROCEDURE — 99900035 HC TECH TIME PER 15 MIN (STAT)

## 2020-05-15 PROCEDURE — 36600 WITHDRAWAL OF ARTERIAL BLOOD: CPT

## 2020-05-15 PROCEDURE — S0030 INJECTION, METRONIDAZOLE: HCPCS | Performed by: INTERNAL MEDICINE

## 2020-05-15 PROCEDURE — 36415 COLL VENOUS BLD VENIPUNCTURE: CPT

## 2020-05-15 PROCEDURE — 84478 ASSAY OF TRIGLYCERIDES: CPT

## 2020-05-15 PROCEDURE — 85025 COMPLETE CBC W/AUTO DIFF WBC: CPT

## 2020-05-15 PROCEDURE — 25000003 PHARM REV CODE 250: Performed by: NURSE PRACTITIONER

## 2020-05-15 PROCEDURE — S0028 INJECTION, FAMOTIDINE, 20 MG: HCPCS | Performed by: FAMILY MEDICINE

## 2020-05-15 PROCEDURE — 99291 CRITICAL CARE FIRST HOUR: CPT | Mod: ,,, | Performed by: NURSE PRACTITIONER

## 2020-05-15 PROCEDURE — 94003 VENT MGMT INPAT SUBQ DAY: CPT

## 2020-05-15 PROCEDURE — 99291 PR CRITICAL CARE, E/M 30-74 MINUTES: ICD-10-PCS | Mod: ,,, | Performed by: NURSE PRACTITIONER

## 2020-05-15 PROCEDURE — 82803 BLOOD GASES ANY COMBINATION: CPT

## 2020-05-15 PROCEDURE — 25000003 PHARM REV CODE 250: Performed by: FAMILY MEDICINE

## 2020-05-15 PROCEDURE — 80202 ASSAY OF VANCOMYCIN: CPT

## 2020-05-15 PROCEDURE — 20000000 HC ICU ROOM

## 2020-05-15 PROCEDURE — 94760 N-INVAS EAR/PLS OXIMETRY 1: CPT

## 2020-05-15 PROCEDURE — 63600175 PHARM REV CODE 636 W HCPCS: Performed by: FAMILY MEDICINE

## 2020-05-15 PROCEDURE — 82140 ASSAY OF AMMONIA: CPT

## 2020-05-15 PROCEDURE — 80053 COMPREHEN METABOLIC PANEL: CPT

## 2020-05-15 RX ORDER — ONDANSETRON 2 MG/ML
4 INJECTION INTRAMUSCULAR; INTRAVENOUS EVERY 8 HOURS PRN
Status: DISCONTINUED | OUTPATIENT
Start: 2020-05-15 | End: 2020-05-17 | Stop reason: HOSPADM

## 2020-05-15 RX ORDER — FUROSEMIDE 20 MG/1
20 TABLET ORAL 2 TIMES DAILY
Status: DISCONTINUED | OUTPATIENT
Start: 2020-05-15 | End: 2020-05-15

## 2020-05-15 RX ORDER — GLYCOPYRROLATE 0.2 MG/ML
0.2 INJECTION INTRAMUSCULAR; INTRAVENOUS ONCE
Status: COMPLETED | OUTPATIENT
Start: 2020-05-15 | End: 2020-05-15

## 2020-05-15 RX ORDER — GLYCOPYRROLATE 0.2 MG/ML
0.2 INJECTION INTRAMUSCULAR; INTRAVENOUS ONCE
Status: DISCONTINUED | OUTPATIENT
Start: 2020-05-15 | End: 2020-05-15

## 2020-05-15 RX ORDER — FUROSEMIDE 20 MG/1
20 TABLET ORAL 2 TIMES DAILY
Status: DISCONTINUED | OUTPATIENT
Start: 2020-05-16 | End: 2020-05-17 | Stop reason: HOSPADM

## 2020-05-15 RX ORDER — VANCOMYCIN HCL IN 5 % DEXTROSE 1G/250ML
1000 PLASTIC BAG, INJECTION (ML) INTRAVENOUS ONCE
Status: COMPLETED | OUTPATIENT
Start: 2020-05-15 | End: 2020-05-15

## 2020-05-15 RX ORDER — METOPROLOL TARTRATE 25 MG/1
12.5 TABLET ORAL 2 TIMES DAILY
Status: DISCONTINUED | OUTPATIENT
Start: 2020-05-15 | End: 2020-05-17 | Stop reason: HOSPADM

## 2020-05-15 RX ORDER — METRONIDAZOLE 500 MG/1
500 TABLET ORAL
Status: DISCONTINUED | OUTPATIENT
Start: 2020-05-15 | End: 2020-05-17 | Stop reason: HOSPADM

## 2020-05-15 RX ORDER — SPIRONOLACTONE 25 MG/5ML
50 SUSPENSION ORAL DAILY
Status: DISCONTINUED | OUTPATIENT
Start: 2020-05-16 | End: 2020-05-16

## 2020-05-15 RX ADMIN — METRONIDAZOLE 500 MG: 500 TABLET, FILM COATED ORAL at 09:05

## 2020-05-15 RX ADMIN — ROBINUL 0.2 MG: 0.2 INJECTION INTRAMUSCULAR; INTRAVENOUS at 08:05

## 2020-05-15 RX ADMIN — ENOXAPARIN SODIUM 40 MG: 100 INJECTION SUBCUTANEOUS at 05:05

## 2020-05-15 RX ADMIN — CEFEPIME 2 G: 2 INJECTION, POWDER, FOR SOLUTION INTRAVENOUS at 01:05

## 2020-05-15 RX ADMIN — VANCOMYCIN HYDROCHLORIDE 1000 MG: 1 INJECTION, POWDER, LYOPHILIZED, FOR SOLUTION INTRAVENOUS at 01:05

## 2020-05-15 RX ADMIN — METOPROLOL TARTRATE 12.5 MG: 25 TABLET, FILM COATED ORAL at 09:05

## 2020-05-15 RX ADMIN — METRONIDAZOLE 500 MG: 500 INJECTION, SOLUTION INTRAVENOUS at 04:05

## 2020-05-15 RX ADMIN — METRONIDAZOLE 500 MG: 500 TABLET, FILM COATED ORAL at 02:05

## 2020-05-15 RX ADMIN — LACTULOSE 30 G: 10 SOLUTION ORAL at 02:05

## 2020-05-15 RX ADMIN — PROPOFOL 30 MCG/KG/MIN: 10 INJECTION, EMULSION INTRAVENOUS at 04:05

## 2020-05-15 RX ADMIN — FAMOTIDINE 20 MG: 10 INJECTION INTRAVENOUS at 09:05

## 2020-05-15 NOTE — ASSESSMENT & PLAN NOTE
Tolerated SBT and extubated this AM  Cont low flow NC O2 and wean for SAT > 92%  Aspiration precautions  NPO till more alert

## 2020-05-15 NOTE — PROGRESS NOTES
[facility] -   Critical Care Medicine  Progress Note    Patient Name: Jossy Siegel  MRN: [id number]  Admission Date: 5/12/2020  Hospital Length of Stay: 2 days  Code Status: Full Code  Attending Provider: Isaías Alas MD  Primary Care Provider: Primary Doctor No   Principal Problem: Hepatic coma/encephalopathy    Subjective:     HPI:  Patient is a 59-year-old  female with a past medical history of liver cirrhosis, hep C, drug abuse, medication noncompliance presents to the ED due to lethargy.  Patient currently intubated therefore history is limited.  After discussing with emergency contact, it was made known that patient was staying with a friend.  It was reported that EMS found patient lethargic and minimally responsive on the scene.  Upon arrival to the ED, there was concern on patient's ability to protect her airway and she was intubated.  Of note patient was recently discharged on 05/01 after being admitted for hepatic encephalopathy.  Patient follow-up with GI in clinic on 05/11.  Patient is currently full code and surrogate decision maker is her daughter.  After speaking with daughter, it was clarified that emergency contact is patient's boyfriend not spouse.     In the ED, patient was found to have an ammonia level 372.  GI was made aware of the case and lactulose was to be given 30 mg every hour for 3 hrs and then t.i.d. afterwards.  UDS was positive for cocaine. Patient was admitted to ICU.    Hospital/ICU Course:  5/14 seen and examined, intubated , sedated with propofol gtt , Afebrile , CXR , ABG reviewed , T max 102.8 , cultures negative   5/15 - Upright in bed intubated on [facility] ventilation in no distress.  Stopped Propofol infusion and passes SBT still lethargic.  Tolerated extubation and VSS    Review of Systems   Unable to perform ROS: Patient nonverbal         Objective:     Vital Signs (Most Recent):  Temp: 97.7 °F (36.5 °C) (05/15/20 0739)  Pulse: 104  (05/15/20 0835)  Resp: 20 (05/15/20 0835)  BP: (!) 108/51 (05/15/20 0835)  SpO2: 100 % (05/15/20 0835) Vital Signs (24h Range):  Temp:  [97.7 °F (36.5 °C)-98.4 °F (36.9 °C)] 97.7 °F (36.5 °C)  Pulse:  [] 104  Resp:  [14-23] 20  SpO2:  [97 %-100 %] 100 %  BP: ()/(46-76) 108/51     Weight: 60.3 kg (133 lb)  Body mass index is 24.33 kg/m².      Intake/Output Summary (Last 24 hours) at 5/15/2020 0933  Last data filed at 5/15/2020 0600  Gross per 24 hour   Intake 897.79 ml   Output 1510 ml   Net -612.21 ml       Physical Exam   Constitutional: Vital signs are normal. She appears well-developed. She appears lethargic.  Non-toxic appearance. She has a sickly appearance. She appears ill. No distress. She is sedated and intubated.   HENT:   Head: Normocephalic and atraumatic.   Mouth/Throat: Oropharynx is clear and moist and mucous membranes are normal.   Eyes: Pupils are equal, round, and reactive to light. EOM and lids are normal.   Neck: Trachea normal and full passive range of motion without pain. Carotid bruit is not present.   Cardiovascular: Normal rate, regular rhythm and normal heart sounds.   Pulses:       Radial pulses are 2+ on the right side, and 2+ on the left side.        Dorsalis pedis pulses are 1+ on the right side, and 1+ on the left side.   Pulmonary/Chest: Effort normal and breath sounds normal. No accessory muscle usage. No tachypnea. She is intubated. No respiratory distress.   Abdominal: Soft. She exhibits ascites (mild). She exhibits no distension. Bowel sounds are decreased. There is no tenderness.   Genitourinary:   Genitourinary Comments: House in position and rectal tube in place   Musculoskeletal: Normal range of motion.        Right foot: There is no deformity.        Left foot: There is no deformity.   No edema.  Milt to mod atrophy   Lymphadenopathy:     She has no cervical adenopathy.   Neurological: She appears lethargic.   Nods head to questions but will not follow simple  one-step commands.  Making purposeful movements and no focal weakness noted.  Withdraws to pain   Skin: Skin is warm, dry and intact. Capillary refill takes less than 2 seconds. No rash noted. No cyanosis.       Vents:  Vent Mode: Spont (05/15/20 0739)  Ventilator Initiated: Yes (05/13/20 0309)  Set Rate: 0 BPM (05/15/20 0739)  Vt Set: 0 mL (05/15/20 0739)  Pressure Support: 5 cmH20 (05/15/20 0739)  PEEP/CPAP: 5 cmH20 (05/15/20 0739)  Oxygen Concentration (%): 36 (05/15/20 0835)  Peak Airway Pressure: 14 cmH2O (05/15/20 0739)  Plateau Pressure: 0 cmH20 (05/15/20 0739)  Total Ve: 3.53 mL (05/15/20 0739)  F/VT Ratio<105 (RSBI): (!) 35.71 (05/15/20 0739)    Lines/Drains/Airways     Drain                 NG/OG Tube 05/13/20 0315 16 Fr. Right mouth 2 days         Urethral Catheter 05/13/20 0329 Latex 16 Fr. 2 days         Rectal Tube 05/14/20 0030 rectal tube w/ balloon (indicate number of mLs) 1 day                Significant Labs:    CBC/Anemia Profile:  Recent Labs   Lab 05/14/20  0023 05/15/20  0755   WBC 9.40 4.24   HGB 10.6* 10.3*   HCT 32.6* 32.0*   * 105*   MCV 99* 101*   RDW 16.7* 16.5*        Chemistries:  Recent Labs   Lab 05/14/20  0023 05/14/20  0429 05/14/20  0634 05/15/20  0755     --  137 147*   K 3.0*  --  3.8 3.7   *  --  112* 117*   CO2 14*  --  15* 17*   BUN 15  --  18 23   CREATININE 1.3  --  1.3 1.4   CALCIUM 8.0*  --  8.3* 8.1*   ALBUMIN  --   --   --  2.3*   PROT  --   --   --  7.0   BILITOT  --   --   --  1.5*   ALKPHOS  --   --   --  129   ALT  --   --   --  27   AST  --   --   --  44*   MG  --  1.9 1.9 1.9       All pertinent labs within the past 24 hours have been reviewed.  NH3 45        ABG  Recent Labs   Lab 05/15/20  0422   PH 7.422   PO2 112*   PCO2 31.5*   HCO3 20.6*   BE -4     Assessment/Plan:     Psychiatric  Cocaine abuse  UDS positive for cocaine  Prior admissions UDS was positive for amphetamines  Will  on drug cessation once patient more alert  Stop  sedation  ICU neuro monitoring    Pulmonary  On mechanically assisted ventilation  Tolerated SBT and extubated this AM  Cont low flow NC O2 and wean for SAT > 92%  Aspiration precautions  NPO till more alert    ID  Severe sepsis with acute organ dysfunction  CXR on admit atelectasis  Febrile 102 in ED but Afebrile since admit  Blood cultures X 2 NGTD  Normal WBC  Cont Cefepime and Flagyl  Stop Vanc  Follow fever curve  Repeat CXR in AM  UA neg on admit    GI  * Hepatic coma/encephalopathy  Admit NH3 372 now down to 45  On Lactulose  ICU neuro monitoring  Encephalopathy likely complicated from illicit cocaine use  Stop Propofol infusion  Rectal tube for lactulose induced diarrhea  CT head on admit no acute process only chronic microvascular changes.     Cirrhosis of liver with ascites related to hepatitis C virus  Follows with Dr. Green recently seen in clinic 5/11 4 days ago  Not transplant candidate due to illicit drug use  On Lactulose, Lasix and Lopressor at home per GI note  Cont Lactulose  MELD score 14  Has had paracentesis PRN in past    Orthopedic  Chronic bilateral low back pain without sciatica  Hold any narcotics due to acute encephalopathy    Other  Tobacco abuse  Will encourage tobacco cessation once more awake and alert      Preventive Measures and Monitoring:   Stress Ulcer: Pepcid  Nutrition: NPO  Glucose control:  stable  Bowel prophylaxis: Lactulose  DVT prophylaxis: LMWH/SCDs  Hx CAD on B-Blocker: Lopressor  Head of Bed/Reposition: Elevate HOB and turn Q1-2 hours   Early Mobility: bed rest till more alert  House Day: #3  IVAB Day: # 2  Code Status: Full    Counseling/Consultation:I have discussed the care of this patient in detail with the bedside nursing staff and Dr. Alas    Critical Care Time: 52 minutes  Critical secondary to Patient has a condition that poses threat to life and bodily function: [facility]  Patient has an abrupt change in neurologic status: acute hepatic  encephalopathy      Critical care was time spent personally by me on the following activities: development of treatment plan with patient or surrogate and bedside caregivers, discussions with consultants, evaluation of patient's response to treatment, examination of patient, ordering and performing treatments and interventions, ordering and review of laboratory studies, ordering and review of radiographic studies, pulse oximetry, re-evaluation of patient's condition. This critical care time did not overlap with that of any other provider or involve time for any procedures.     Yakov Minor NP  Critical Care Medicine  [facility]

## 2020-05-15 NOTE — ASSESSMENT & PLAN NOTE
Admit NH3 372 now down to 45  On Lactulose  ICU neuro monitoring  Encephalopathy likely complicated from illicit cocaine use  Stop Propofol infusion  Rectal tube for lactulose induced diarrhea

## 2020-05-15 NOTE — ASSESSMENT & PLAN NOTE
Follows with Dr. Green recently seen in clinic 5/11 4 days ago  Not transplant candidate due to illicit drug use  On Lactulose, Lasix and Lopressor at home per GI note  Cont Lactulose  MELD score 14  Has had paracentesis PRN in past

## 2020-05-15 NOTE — ASSESSMENT & PLAN NOTE
CXR on admit atelectasis  Febrile 102 in ED but Afebrile since admit  Blood cultures X 2 NGTD  Normal WBC  Cont Cefepime and Flagyl  Stop Vanc  Follow fever curve  Repeat CXR in AM  UA neg on admit

## 2020-05-15 NOTE — PLAN OF CARE
Patient resting quietly on ventilator. No changes to settings. Propofol at 30 at periph iv in left hand. Patient will flicker eyelids and withdraw extremities to voice. Will not follow commands and will not open eyes. Extremities stiff but no longer posturing. No fever during pm shift. Abdomen distended and flexiseal patent and draining with 400 cc output. House patent and draining with 25-30 cc/hr. Vital signs stable during shift. Spoke with daughter and she stated that she wanted her mom to remain a full code and stay intubated if medically necessary.

## 2020-05-15 NOTE — ASSESSMENT & PLAN NOTE
UDS positive for cocaine  Prior admissions UDS was positive for amphetamines  Will  on drug cessation once patient more alert  Stop sedation  ICU neuro monitoring

## 2020-05-15 NOTE — PROGRESS NOTES
Pharmacokinetic Assessment Follow Up: IV Vancomycin    Vancomycin serum concentration assessment(s):    The random level was drawn correctly and can be used to guide therapy at this time. The measurement is below the desired definitive target range of 15 to 20 mcg/mL.    Vancomycin Regimen Plan:    Due to a random level of 11.1, we will administer a one time dose of Vancomycin 1 g this morning at 0130, 5/15/20  Re-dose when the random level is less than 20 mcg/mL, next level to be drawn at 0200 on 5/16/20     Drug levels (last 3 results):  Recent Labs   Lab Result Units 05/15/20  0010   Vancomycin, Random ug/mL 11.1       Pharmacy will continue to follow and monitor vancomycin.    Please contact pharmacy at extension [phone] for questions regarding this assessment.    Thank you for the consult,   Erick Garrido       Patient brief summary:  Jossy Siegel is a 59 y.o. female initiated on antimicrobial therapy with IV Vancomycin for treatment of lower respiratory infection    The patient's current regimen is Pulse dosing daily to determine the appropriate dose that maintains a therapeutic serum level    Drug Allergies:   Review of patient's allergies indicates:  No Known Allergies    Actual Body Weight:   60.3 kg    Renal Function:   Estimated Creatinine Clearance: 38.9 mL/min (based on SCr of 1.3 mg/dL).,     Dialysis Method (if applicable):  N/A    CBC (last 72 hours):  Recent Labs   Lab Result Units 05/12/20  2344 05/14/20  0023   WBC K/uL 3.35* 9.40   Hemoglobin g/dL 9.7* 10.6*   Hematocrit % 29.8* 32.6*   Platelets K/uL 95* 111*   Gran% % 63.9 74.5*   Lymph% % 16.4* 8.5*   Mono% % 16.1* 15.2*   Eosinophil% % 1.8 0.6   Basophil% % 0.6 0.3   Differential Method  Automated Automated       Metabolic Panel (last 72 hours):  Recent Labs   Lab Result Units 05/12/20  2344 05/13/20  0004 05/13/20  0930 05/14/20  0023 05/14/20  0429 05/14/20  0634   Sodium mmol/L 139  --  142 142  --  137   Potassium mmol/L 4.2   --  3.5 3.0*  --  3.8   Chloride mmol/L 109  --  111* 113*  --  112*   CO2 mmol/L 20*  --  18* 14*  --  15*   Glucose mg/dL 121*  --  98 126*  --  108   Glucose, UA   --  Negative  --   --   --   --    BUN, Bld mg/dL 13  --  14 15  --  18   Creatinine mg/dL 1.0  --  1.1 1.3  --  1.3   Creatinine, Random Ur mg/dL  --  65.6  --   --   --   --    Albumin g/dL 2.4*  --  2.3*  --   --   --    Total Bilirubin mg/dL 1.1*  --  2.1*  --   --   --    Alkaline Phosphatase U/L 192*  --  164*  --   --   --    AST U/L 50*  --  51*  --   --   --    ALT U/L 30  --  29  --   --   --    Magnesium mg/dL  --   --  1.7  --  1.9 1.9   Phosphorus mg/dL  --   --  2.5*  --   --   --        Vancomycin Administrations:  vancomycin given in the last 96 hours                     vancomycin 1.5 g in 5 % dextrose 250 mL IVPB (mg) 1,500 mg New Bag 05/14/20 0031                      Microbiologic Results:  Microbiology Results (last 7 days)       Procedure Component Value Units Date/Time    Blood culture [[id number]] Collected:  05/14/20 0023    Order Status:  Completed Specimen:  Blood Updated:  05/14/20 1715     Blood Culture, Routine No Growth to date    Blood culture [[id number]] Collected:  05/14/20 0015    Order Status:  Completed Specimen:  Blood Updated:  05/14/20 1715     Blood Culture, Routine No Growth to date    Culture, Respiratory with Gram Stain [[id number]] Collected:  05/14/20 0036    Order Status:  Completed Specimen:  Respiratory from Tracheal Aspirate Updated:  05/14/20 1815     Gram Stain (Respiratory) <10 epithelial cells per low power field.     Gram Stain (Respiratory) Many WBC's     Gram Stain (Respiratory) Rare Gram positive cocci

## 2020-05-15 NOTE — SUBJECTIVE & OBJECTIVE
Review of Systems   Unable to perform ROS: Patient nonverbal         Objective:     Vital Signs (Most Recent):  Temp: 97.7 °F (36.5 °C) (05/15/20 0739)  Pulse: 104 (05/15/20 0835)  Resp: 20 (05/15/20 0835)  BP: (!) 108/51 (05/15/20 0835)  SpO2: 100 % (05/15/20 0835) Vital Signs (24h Range):  Temp:  [97.7 °F (36.5 °C)-98.4 °F (36.9 °C)] 97.7 °F (36.5 °C)  Pulse:  [] 104  Resp:  [14-23] 20  SpO2:  [97 %-100 %] 100 %  BP: ()/(46-76) 108/51     Weight: 60.3 kg (133 lb)  Body mass index is 24.33 kg/m².      Intake/Output Summary (Last 24 hours) at 5/15/2020 0933  Last data filed at 5/15/2020 0600  Gross per 24 hour   Intake 897.79 ml   Output 1510 ml   Net -612.21 ml       Physical Exam   Constitutional: Vital signs are normal. She appears well-developed. She appears lethargic.  Non-toxic appearance. She has a sickly appearance. She appears ill. No distress. She is sedated and intubated.   HENT:   Head: Normocephalic and atraumatic.   Mouth/Throat: Oropharynx is clear and moist and mucous membranes are normal.   Eyes: Pupils are equal, round, and reactive to light. EOM and lids are normal.   Neck: Trachea normal and full passive range of motion without pain. Carotid bruit is not present.   Cardiovascular: Normal rate, regular rhythm and normal heart sounds.   Pulses:       Radial pulses are 2+ on the right side, and 2+ on the left side.        Dorsalis pedis pulses are 1+ on the right side, and 1+ on the left side.   Pulmonary/Chest: Effort normal and breath sounds normal. No accessory muscle usage. No tachypnea. She is intubated. No respiratory distress.   Abdominal: Soft. She exhibits ascites (mild). She exhibits no distension. Bowel sounds are decreased. There is no tenderness.   Genitourinary:   Genitourinary Comments: House in position and rectal tube in place   Musculoskeletal: Normal range of motion.        Right foot: There is no deformity.        Left foot: There is no deformity.   No edema.  Milt to  mod atrophy   Lymphadenopathy:     She has no cervical adenopathy.   Neurological: She appears lethargic.   Nods head to questions but will not follow simple one-step commands.  Making purposeful movements and no focal weakness noted.  Withdraws to pain   Skin: Skin is warm, dry and intact. Capillary refill takes less than 2 seconds. No rash noted. No cyanosis.       Vents:  Vent Mode: Spont (05/15/20 0739)  Ventilator Initiated: Yes (05/13/20 0309)  Set Rate: 0 BPM (05/15/20 0739)  Vt Set: 0 mL (05/15/20 0739)  Pressure Support: 5 cmH20 (05/15/20 0739)  PEEP/CPAP: 5 cmH20 (05/15/20 0739)  Oxygen Concentration (%): 36 (05/15/20 0835)  Peak Airway Pressure: 14 cmH2O (05/15/20 0739)  Plateau Pressure: 0 cmH20 (05/15/20 0739)  Total Ve: 3.53 mL (05/15/20 0739)  F/VT Ratio<105 (RSBI): (!) 35.71 (05/15/20 0739)    Lines/Drains/Airways     Drain                 NG/OG Tube 05/13/20 0315 16 Fr. Right mouth 2 days         Urethral Catheter 05/13/20 0329 Latex 16 Fr. 2 days         Rectal Tube 05/14/20 0030 rectal tube w/ balloon (indicate number of mLs) 1 day                Significant Labs:    CBC/Anemia Profile:  Recent Labs   Lab 05/14/20  0023 05/15/20  0755   WBC 9.40 4.24   HGB 10.6* 10.3*   HCT 32.6* 32.0*   * 105*   MCV 99* 101*   RDW 16.7* 16.5*        Chemistries:  Recent Labs   Lab 05/14/20  0023 05/14/20  0429 05/14/20  0634 05/15/20  0755     --  137 147*   K 3.0*  --  3.8 3.7   *  --  112* 117*   CO2 14*  --  15* 17*   BUN 15  --  18 23   CREATININE 1.3  --  1.3 1.4   CALCIUM 8.0*  --  8.3* 8.1*   ALBUMIN  --   --   --  2.3*   PROT  --   --   --  7.0   BILITOT  --   --   --  1.5*   ALKPHOS  --   --   --  129   ALT  --   --   --  27   AST  --   --   --  44*   MG  --  1.9 1.9 1.9       All pertinent labs within the past 24 hours have been reviewed.  NH3 45

## 2020-05-16 PROBLEM — Z99.11 ON MECHANICALLY ASSISTED VENTILATION: Status: RESOLVED | Noted: 2020-05-15 | Resolved: 2020-05-16

## 2020-05-16 LAB
ALBUMIN SERPL BCP-MCNC: 2.3 G/DL (ref 3.5–5.2)
ALP SERPL-CCNC: 131 U/L (ref 55–135)
ALT SERPL W/O P-5'-P-CCNC: 29 U/L (ref 10–44)
AMMONIA PLAS-SCNC: 23 UMOL/L (ref 10–50)
AMMONIA PLAS-SCNC: 28 UMOL/L (ref 10–50)
ANION GAP SERPL CALC-SCNC: 7 MMOL/L (ref 8–16)
AST SERPL-CCNC: 54 U/L (ref 10–40)
BACTERIA SPEC AEROBE CULT: NORMAL
BACTERIA SPEC AEROBE CULT: NORMAL
BASOPHILS # BLD AUTO: 0.02 K/UL (ref 0–0.2)
BASOPHILS NFR BLD: 0.8 % (ref 0–1.9)
BILIRUB SERPL-MCNC: 2.1 MG/DL (ref 0.1–1)
BUN SERPL-MCNC: 28 MG/DL (ref 8–23)
CALCIUM SERPL-MCNC: 7.8 MG/DL (ref 8.7–10.5)
CHLORIDE SERPL-SCNC: 117 MMOL/L (ref 95–110)
CO2 SERPL-SCNC: 22 MMOL/L (ref 23–29)
CREAT SERPL-MCNC: 1.2 MG/DL (ref 0.5–1.4)
DIFFERENTIAL METHOD: ABNORMAL
EOSINOPHIL # BLD AUTO: 0.1 K/UL (ref 0–0.5)
EOSINOPHIL NFR BLD: 5.4 % (ref 0–8)
ERYTHROCYTE [DISTWIDTH] IN BLOOD BY AUTOMATED COUNT: 16.2 % (ref 11.5–14.5)
EST. GFR  (AFRICAN AMERICAN): 56 ML/MIN/1.73 M^2
EST. GFR  (NON AFRICAN AMERICAN): 49 ML/MIN/1.73 M^2
GLUCOSE SERPL-MCNC: 87 MG/DL (ref 70–110)
GRAM STN SPEC: NORMAL
HCT VFR BLD AUTO: 31 % (ref 37–48.5)
HGB BLD-MCNC: 9.8 G/DL (ref 12–16)
IMM GRANULOCYTES # BLD AUTO: 0.05 K/UL (ref 0–0.04)
IMM GRANULOCYTES NFR BLD AUTO: 1.9 % (ref 0–0.5)
INR PPP: 1.3 (ref 0.8–1.2)
LYMPHOCYTES # BLD AUTO: 0.5 K/UL (ref 1–4.8)
LYMPHOCYTES NFR BLD: 19.6 % (ref 18–48)
MAGNESIUM SERPL-MCNC: 2 MG/DL (ref 1.6–2.6)
MCH RBC QN AUTO: 31.9 PG (ref 27–31)
MCHC RBC AUTO-ENTMCNC: 31.6 G/DL (ref 32–36)
MCV RBC AUTO: 101 FL (ref 82–98)
MONOCYTES # BLD AUTO: 0.6 K/UL (ref 0.3–1)
MONOCYTES NFR BLD: 23.1 % (ref 4–15)
NEUTROPHILS # BLD AUTO: 1.3 K/UL (ref 1.8–7.7)
NEUTROPHILS NFR BLD: 49.2 % (ref 38–73)
NRBC BLD-RTO: 0 /100 WBC
PLATELET # BLD AUTO: 82 K/UL (ref 150–350)
PMV BLD AUTO: 11.1 FL (ref 9.2–12.9)
POTASSIUM SERPL-SCNC: 3.1 MMOL/L (ref 3.5–5.1)
PROT SERPL-MCNC: 6.8 G/DL (ref 6–8.4)
PROTHROMBIN TIME: 13.5 SEC (ref 9–12.5)
RBC # BLD AUTO: 3.07 M/UL (ref 4–5.4)
SODIUM SERPL-SCNC: 146 MMOL/L (ref 136–145)
WBC # BLD AUTO: 2.6 K/UL (ref 3.9–12.7)

## 2020-05-16 PROCEDURE — 25000003 PHARM REV CODE 250: Performed by: NURSE PRACTITIONER

## 2020-05-16 PROCEDURE — 11000001 HC ACUTE MED/SURG PRIVATE ROOM

## 2020-05-16 PROCEDURE — 99291 CRITICAL CARE FIRST HOUR: CPT | Mod: ,,, | Performed by: NURSE PRACTITIONER

## 2020-05-16 PROCEDURE — 85610 PROTHROMBIN TIME: CPT

## 2020-05-16 PROCEDURE — 80053 COMPREHEN METABOLIC PANEL: CPT

## 2020-05-16 PROCEDURE — 63600175 PHARM REV CODE 636 W HCPCS: Performed by: NURSE PRACTITIONER

## 2020-05-16 PROCEDURE — 99291 PR CRITICAL CARE, E/M 30-74 MINUTES: ICD-10-PCS | Mod: ,,, | Performed by: NURSE PRACTITIONER

## 2020-05-16 PROCEDURE — 63600175 PHARM REV CODE 636 W HCPCS: Performed by: FAMILY MEDICINE

## 2020-05-16 PROCEDURE — 36415 COLL VENOUS BLD VENIPUNCTURE: CPT

## 2020-05-16 PROCEDURE — 83735 ASSAY OF MAGNESIUM: CPT

## 2020-05-16 PROCEDURE — 85025 COMPLETE CBC W/AUTO DIFF WBC: CPT

## 2020-05-16 PROCEDURE — 25000003 PHARM REV CODE 250: Performed by: INTERNAL MEDICINE

## 2020-05-16 PROCEDURE — 82140 ASSAY OF AMMONIA: CPT

## 2020-05-16 RX ORDER — LACTULOSE 10 G/15ML
10 SOLUTION ORAL 3 TIMES DAILY
Status: DISCONTINUED | OUTPATIENT
Start: 2020-05-16 | End: 2020-05-17 | Stop reason: HOSPADM

## 2020-05-16 RX ORDER — FAMOTIDINE 20 MG/1
20 TABLET, FILM COATED ORAL DAILY
Status: DISCONTINUED | OUTPATIENT
Start: 2020-05-16 | End: 2020-05-17

## 2020-05-16 RX ORDER — SPIRONOLACTONE 25 MG/1
50 TABLET ORAL 2 TIMES DAILY
Status: DISCONTINUED | OUTPATIENT
Start: 2020-05-16 | End: 2020-05-17 | Stop reason: HOSPADM

## 2020-05-16 RX ORDER — POTASSIUM CHLORIDE 20 MEQ/1
40 TABLET, EXTENDED RELEASE ORAL EVERY 4 HOURS
Status: DISCONTINUED | OUTPATIENT
Start: 2020-05-16 | End: 2020-05-16

## 2020-05-16 RX ORDER — LACTULOSE 10 G/15ML
10 SOLUTION ORAL 3 TIMES DAILY
Status: DISCONTINUED | OUTPATIENT
Start: 2020-05-16 | End: 2020-05-16

## 2020-05-16 RX ORDER — POTASSIUM CHLORIDE 20 MEQ/1
40 TABLET, EXTENDED RELEASE ORAL ONCE
Status: COMPLETED | OUTPATIENT
Start: 2020-05-16 | End: 2020-05-16

## 2020-05-16 RX ADMIN — METOPROLOL TARTRATE 12.5 MG: 25 TABLET, FILM COATED ORAL at 09:05

## 2020-05-16 RX ADMIN — CEFEPIME 2 G: 2 INJECTION, POWDER, FOR SOLUTION INTRAVENOUS at 01:05

## 2020-05-16 RX ADMIN — LACTULOSE 10 G: 20 SOLUTION ORAL at 11:05

## 2020-05-16 RX ADMIN — POTASSIUM CHLORIDE 40 MEQ: 1500 TABLET, EXTENDED RELEASE ORAL at 11:05

## 2020-05-16 RX ADMIN — METRONIDAZOLE 500 MG: 500 TABLET, FILM COATED ORAL at 05:05

## 2020-05-16 RX ADMIN — FUROSEMIDE 20 MG: 20 TABLET ORAL at 09:05

## 2020-05-16 RX ADMIN — FAMOTIDINE 20 MG: 20 TABLET ORAL at 11:05

## 2020-05-16 RX ADMIN — METRONIDAZOLE 500 MG: 500 TABLET, FILM COATED ORAL at 12:05

## 2020-05-16 RX ADMIN — METOPROLOL TARTRATE 12.5 MG: 25 TABLET, FILM COATED ORAL at 11:05

## 2020-05-16 RX ADMIN — SPIRONOLACTONE 50 MG: 25 TABLET ORAL at 09:05

## 2020-05-16 RX ADMIN — LACTULOSE 10 G: 20 SOLUTION ORAL at 03:05

## 2020-05-16 RX ADMIN — POTASSIUM CHLORIDE 40 MEQ: 20 TABLET, EXTENDED RELEASE ORAL at 03:05

## 2020-05-16 RX ADMIN — ENOXAPARIN SODIUM 40 MG: 100 INJECTION SUBCUTANEOUS at 05:05

## 2020-05-16 RX ADMIN — SPIRONOLACTONE 50 MG: 25 TABLET ORAL at 11:05

## 2020-05-16 RX ADMIN — METRONIDAZOLE 500 MG: 500 TABLET, FILM COATED ORAL at 09:05

## 2020-05-16 RX ADMIN — LACTULOSE 10 G: 20 SOLUTION ORAL at 09:05

## 2020-05-16 RX ADMIN — FUROSEMIDE 20 MG: 20 TABLET ORAL at 11:05

## 2020-05-16 NOTE — ASSESSMENT & PLAN NOTE
Admit NH3 372 now down to 23  Cont Lactulose decrease to home dose  Cont neuro monitoring  Has lactulose induced diarrhea

## 2020-05-16 NOTE — PLAN OF CARE
Fall precautions maintained. Pt free from injuries/fall.  Repositions and ambulates with assist 1-2 people.  No complaints of pain.  Rings sent down to safe.  Bed locked and low, side rails up x 2, phone and call light w/in reach.   POC and meds discussed, pt verbalized understanding.   Chart check done. Will cont to monitor.

## 2020-05-16 NOTE — ASSESSMENT & PLAN NOTE
CXR on admit atelectasis  Febrile 102 in ED but Afebrile since admit  Blood cultures X 2 NGTD  No leukocytosis  Cont Cefepime and Flagyl X 5 days then stop  Follow fever curve  Repeat CXR this AM no acute infectious process appreciated  UA neg on admit

## 2020-05-16 NOTE — ASSESSMENT & PLAN NOTE
Follows with Dr. Green recently seen in clinic 5/11 4 days ago  Not transplant candidate due to illicit drug use  On Lactulose, Lasix, Rifaximin, Spironolactone and Lopressor at home per GI note  Cont Lactulose, Lasix, Lopressor and Spironolactone  MELD score 14  Has had paracentesis PRN in past  Follow up with Dr. Green - Hepatology

## 2020-05-16 NOTE — ASSESSMENT & PLAN NOTE
UDS positive for cocaine on admit  Prior admissions UDS was positive for amphetamines  Counseled on need for cocaine cessation  Cont neuro monitoring

## 2020-05-16 NOTE — PLAN OF CARE
Problem: Wound  Goal: Optimal Wound Healing  Outcome: Ongoing, Progressing     Problem: Fall Injury Risk  Goal: Absence of Fall and Fall-Related Injury  Outcome: Ongoing, Progressing     Problem: Communication Impairment (Mechanical Ventilation, Invasive)  Goal: Effective Communication  Outcome: Ongoing, Progressing     Problem: Device-Related Complication Risk (Mechanical Ventilation, Invasive)  Goal: Optimal Device Function  Outcome: Ongoing, Progressing     Problem: Inability to Wean (Mechanical Ventilation, Invasive)  Goal: Mechanical Ventilation Liberation  Outcome: Ongoing, Progressing     Problem: Nutrition Impairment (Mechanical Ventilation, Invasive)  Goal: Optimal Nutrition Delivery  Outcome: Ongoing, Progressing     Problem: Skin and Tissue Injury (Mechanical Ventilation, Invasive)  Goal: Absence of Device-Related Skin and Tissue Injury  Outcome: Ongoing, Progressing     Problem: Ventilator-Induced Lung Injury (Mechanical Ventilation, Invasive)  Goal: Absence of Ventilator-Induced Lung Injury  Outcome: Ongoing, Progressing     Problem: Communication Impairment (Artificial Airway)  Goal: Effective Communication  Outcome: Ongoing, Progressing     Problem: Device-Related Complication Risk (Artificial Airway)  Goal: Optimal Device Function  Outcome: Ongoing, Progressing     Problem: Skin and Tissue Injury (Artificial Airway)  Goal: Absence of Device-Related Skin or Tissue Injury  Outcome: Ongoing, Progressing     Problem: Noninvasive Ventilation Acute  Goal: Effective Unassisted Ventilation and Oxygenation  Outcome: Ongoing, Progressing     Problem: Adult Inpatient Plan of Care  Goal: Plan of Care Review  Outcome: Ongoing, Progressing  Goal: Patient-Specific Goal (Individualization)  Outcome: Ongoing, Progressing  Goal: Absence of Hospital-Acquired Illness or Injury  Outcome: Ongoing, Progressing  Goal: Optimal Comfort and Wellbeing  Outcome: Ongoing, Progressing  Goal: Readiness for Transition of  Care  Outcome: Ongoing, Progressing  Goal: Rounds/Family Conference  Outcome: Ongoing, Progressing     Problem: Infection  Goal: Infection Symptom Resolution  Outcome: Ongoing, Progressing     Problem: Skin Injury Risk Increased  Goal: Skin Health and Integrity  Outcome: Ongoing, Progressing     Problem: Fluid Imbalance (Pneumonia)  Goal: Fluid Balance  Outcome: Ongoing, Progressing     Problem: Infection (Pneumonia)  Goal: Resolution of Infection Signs/Symptoms  Outcome: Ongoing, Progressing     Problem: Respiratory Compromise (Pneumonia)  Goal: Effective Oxygenation and Ventilation  Outcome: Ongoing, Progressing     Problem: Adjustment to Illness (Sepsis/Septic Shock)  Goal: Optimal Coping  Outcome: Ongoing, Progressing     Problem: Bleeding (Sepsis/Septic Shock)  Goal: Absence of Bleeding  Outcome: Ongoing, Progressing     Problem: Glycemic Control Impaired (Sepsis/Septic Shock)  Goal: Blood Glucose Level Within Desired Range  Outcome: Ongoing, Progressing     Problem: Hemodynamic Instability (Sepsis/Septic Shock)  Goal: Effective Tissue Perfusion  Outcome: Ongoing, Progressing     Problem: Infection (Sepsis/Septic Shock)  Goal: Absence of Infection Signs/Symptoms  Outcome: Ongoing, Progressing     Problem: Nutrition Impaired (Sepsis/Septic Shock)  Goal: Optimal Nutrition Intake  Outcome: Ongoing, Progressing     Problem: Respiratory Compromise (Sepsis/Septic Shock)  Goal: Effective Oxygenation and Ventilation  Outcome: Ongoing, Progressing   PT extubated today, restless and confused. Required frequent redirection. Pt progressed to Room air, blood pressure and hr remained stable. HR ST at times when patient was  restless and confused. Bedside swallow nursing eval passed. Diet ordered. House and fecal management system patent and continued. Pt more alert and less confused by the end of the day.

## 2020-05-16 NOTE — NURSING
Report called to [facility] to the 5th floor. PT to go to room 543, discussed pt current condition, medications, IV's, and care needs. Transported pt via 2 person assist with wheel chair. Pt belongings transported  along with two silver color rings. PT VS stable, denies distress or pain. Pt required 2 person assist to BSC and to bed. Questions answered, daughter Flori notified of tranfer to 543.

## 2020-05-16 NOTE — PROGRESS NOTES
[facility] -   Critical Care Medicine  Progress Note    Patient Name: Jossy Siegel  MRN: [id number]  Admission Date: 5/12/2020  Hospital Length of Stay: 3 days  Code Status: Full Code  Attending Provider: Isaías Alas MD  Primary Care Provider: Primary Doctor No   Principal Problem: Hepatic coma/encephalopathy    Subjective:     HPI:  Patient is a 59-year-old  female with a past medical history of liver cirrhosis, hep C, drug abuse, medication noncompliance presents to the ED due to lethargy.  Patient currently intubated therefore history is limited.  After discussing with emergency contact, it was made known that patient was staying with a friend.  It was reported that EMS found patient lethargic and minimally responsive on the scene.  Upon arrival to the ED, there was concern on patient's ability to protect her airway and she was intubated.  Of note patient was recently discharged on 05/01 after being admitted for hepatic encephalopathy.  Patient follow-up with GI in clinic on 05/11.  Patient is currently full code and surrogate decision maker is her daughter.  After speaking with daughter, it was clarified that emergency contact is patient's boyfriend not spouse.     In the ED, patient was found to have an ammonia level 372.  GI was made aware of the case and lactulose was to be given 30 mg every hour for 3 hrs and then t.i.d. afterwards.  UDS was positive for cocaine. Patient was admitted to ICU.    Hospital/ICU Course:  5/14 seen and examined, intubated , sedated with propofol gtt , Afebrile , CXR , ABG reviewed , T max 102.8 , cultures negative   5/15 - Upright in bed intubated on [facility] ventilation in no distress.  Stopped Propofol infusion and passes SBT still lethargic.  Tolerated extubation and VSS  5/16 - Fully awake, alert, responsive and with orientation X 3.  Upright in bed tolerating diet.  Tolerated extubation yesterday.  No distress and VSS.  Periods of  confusion noted this AM at 0530 hr standing upright at bedside naked.     Review of Systems   Constitutional: Positive for malaise/fatigue. Negative for chills and fever.   HENT: Negative for congestion.    Eyes: Negative for blurred vision.   Respiratory: Negative for cough, sputum production and shortness of breath.    Cardiovascular: Negative for chest pain and leg swelling.   Gastrointestinal: Negative for abdominal pain, nausea and vomiting.   Genitourinary: Negative for dysuria.   Musculoskeletal: Negative for myalgias.   Skin: Negative for rash.   Neurological: Negative for dizziness, weakness and headaches.   Endo/Heme/Allergies: Does not bruise/bleed easily.   Psychiatric/Behavioral: The patient is not nervous/anxious.          Objective:     Vital Signs (Most Recent):  Temp: 98.7 °F (37.1 °C) (05/16/20 0330)  Pulse: 90 (05/16/20 0803)  Resp: 16 (05/16/20 0803)  BP: 117/80 (05/16/20 0500)  SpO2: 100 % (05/16/20 0803) Vital Signs (24h Range):  Temp:  [97.8 °F (36.6 °C)-98.7 °F (37.1 °C)] 98.7 °F (37.1 °C)  Pulse:  [] 90  Resp:  [13-25] 16  SpO2:  [91 %-100 %] 100 %  BP: ()/(45-95) 117/80     Weight: 60.3 kg (133 lb)  Body mass index is 24.33 kg/m².      Intake/Output Summary (Last 24 hours) at 5/16/2020 1033  Last data filed at 5/16/2020 0500  Gross per 24 hour   Intake 100 ml   Output 1165 ml   Net -1065 ml       Physical Exam   Constitutional: She is oriented to person, place, and time. Vital signs are normal. She appears well-developed. She is cooperative.  Non-toxic appearance. She has a sickly appearance. She appears ill. No distress.   HENT:   Head: Normocephalic and atraumatic.   Mouth/Throat: Oropharynx is clear and moist and mucous membranes are normal.   Eyes: Pupils are equal, round, and reactive to light. EOM and lids are normal.   Neck: Trachea normal and full passive range of motion without pain. Carotid bruit is not present.   Cardiovascular: Normal rate, regular rhythm and normal  heart sounds.   Pulses:       Radial pulses are 2+ on the right side, and 2+ on the left side.        Dorsalis pedis pulses are 2+ on the right side, and 2+ on the left side.   Pulmonary/Chest: Effort normal and breath sounds normal. No accessory muscle usage. No tachypnea. No respiratory distress.   Abdominal: Soft. She exhibits ascites (mild). She exhibits no distension. Bowel sounds are decreased. There is no tenderness.   Genitourinary:   Genitourinary Comments: House in position    Musculoskeletal: Normal range of motion.        Right foot: There is no deformity.        Left foot: There is no deformity.   No edema.  Mild to mod atrophy   Lymphadenopathy:     She has no cervical adenopathy.   Neurological: She is alert and oriented to person, place, and time.   Some confusion at times getting OOB without assistance   Skin: Skin is warm, dry and intact. Capillary refill takes less than 2 seconds. No rash noted. No cyanosis.   Psychiatric: Her speech is normal. Thought content is delusional. She expresses impulsivity. She exhibits abnormal recent memory.       Vents:  Vent Mode: Spont (05/15/20 0739)  Ventilator Initiated: Yes (05/13/20 0309)  Set Rate: 0 BPM (05/15/20 0739)  Vt Set: 0 mL (05/15/20 0739)  Pressure Support: 5 cmH20 (05/15/20 0739)  PEEP/CPAP: 5 cmH20 (05/15/20 0739)  Oxygen Concentration (%): 36 (05/15/20 0835)  Peak Airway Pressure: 14 cmH2O (05/15/20 0739)  Plateau Pressure: 0 cmH20 (05/15/20 0739)  Total Ve: 3.53 mL (05/15/20 0739)  F/VT Ratio<105 (RSBI): (!) 35.71 (05/15/20 0739)    Lines/Drains/Airways     Drain                 Urethral Catheter 05/13/20 0329 Latex 16 Fr. 3 days          Peripheral Intravenous Line                 Peripheral IV - Single Lumen 05/15/20 1000 18 G Anterior;Left;Proximal Forearm 1 day                Significant Labs:    CBC/Anemia Profile:  Recent Labs   Lab 05/15/20  0755 05/16/20  0431   WBC 4.24 2.60*   HGB 10.3* 9.8*   HCT 32.0* 31.0*   * 82*   *  101*   RDW 16.5* 16.2*        Chemistries:  Recent Labs   Lab 05/15/20  0755 05/16/20  0430   * 146*   K 3.7 3.1*   * 117*   CO2 17* 22*   BUN 23 28*   CREATININE 1.4 1.2   CALCIUM 8.1* 7.8*   ALBUMIN 2.3* 2.3*   PROT 7.0 6.8   BILITOT 1.5* 2.1*   ALKPHOS 129 131   ALT 27 29   AST 44* 54*   MG 1.9 2.0       Coagulation:   Recent Labs   Lab 05/16/20  0228   INR 1.3*     All pertinent labs within the past 24 hours have been reviewed.    Significant Imaging:  CXR: I have reviewed all pertinent results/findings within the past 24 hours and my personal findings are:  no acute pulm process appreciated      ABG  Recent Labs   Lab 05/15/20  0422   PH 7.422   PO2 112*   PCO2 31.5*   HCO3 20.6*   BE -4     Assessment/Plan:     Psychiatric  Cocaine abuse  UDS positive for cocaine on admit  Prior admissions UDS was positive for amphetamines  Counseled on need for cocaine cessation  Cont neuro monitoring    Renal/  Hypokalemia  Replaced PO  Repeat labs in AM    ID  Severe sepsis with acute organ dysfunction  CXR on admit atelectasis  Febrile 102 in ED but Afebrile since admit  Blood cultures X 2 NGTD  No leukocytosis  Cont Cefepime and Flagyl X 5 days then stop  Follow fever curve  Repeat CXR this AM no acute infectious process appreciated  UA neg on admit      Oncology  Pancytopenia  Chronic  No active bleeding  Conservative transfusion protocol  Follow up with Hepatology    GI  * Hepatic coma/encephalopathy  Admit NH3 372 now down to 23  Cont Lactulose decrease to home dose  Cont neuro monitoring  Has lactulose induced diarrhea    Cirrhosis of liver with ascites related to hepatitis C virus  Follows with Dr. Green recently seen in clinic 5/11 4 days ago  Not transplant candidate due to illicit drug use  On Lactulose, Lasix, Rifaximin, Spironolactone and Lopressor at home per GI note  Cont Lactulose, Lasix, Lopressor and Spironolactone  MELD score 14  Has had paracentesis PRN in past  Follow up with Dr. Green -  Hepatology    Orthopedic  Chronic bilateral low back pain without sciatica  Hold any narcotics due to acute encephalopathy    Other  Tobacco abuse  Encouraged tobacco cessation       Preventive Measures and Monitoring:   Stress Ulcer: Pepcid  Nutrition: Diet  Glucose control: stable  Bowel prophylaxis: Lactulose  DVT prophylaxis: LMWH/SCDs  Hx CAD on B-Blocker: Lopressor  Head of Bed/Reposition: Elevate HOB and turn Q1-2 hours   Early Mobility: OOB  House Day: #4  IVAB Day: # 3  Code Status: Full     Counseling/Consultation:I have discussed the care of this patient in detail with the bedside nursing staff and Dr. Alas     Critical Care Time: 42 minutes  Patient has an abrupt change in neurologic status: acute hepatic encephalopathy      Critical care was time spent personally by me on the following activities: development of treatment plan with patient or surrogate and bedside caregivers, discussions with consultants, evaluation of patient's response to treatment, examination of patient, ordering and performing treatments and interventions, ordering and review of laboratory studies, ordering and review of radiographic studies, pulse oximetry, re-evaluation of patient's condition. This critical care time did not overlap with that of any other provider or involve time for any procedures.     Yakov Minor, NP  Critical Care Medicine  [facility] -

## 2020-05-16 NOTE — H&P (VIEW-ONLY)
[facility] Hospital Medicine  Consult Note    Patient Name: Jossy Siegel  MRN: [id number]  Admission Date: 5/12/2020  Hospital Length of Stay: 3 days  Attending Physician: Rodo Davison MD  Primary Care Provider: Primary Doctor No           Patient information was obtained from patient, past medical records and ER records.     Consults  Subjective:     Principal Problem: Hepatic coma/encephalopathy    Chief Complaint:   Chief Complaint   Patient presents with    Fatigue     lethargic on scene per EMS, also report fever        HPI: Patient is a 59-year-old  female with a past medical history of liver cirrhosis, hep C, drug abuse, medication noncompliance presents to the ED due to lethargy.   She was intubated on arrival as there were concerns for airway protection. Ammonia level was 372 , UDS + cocaine. Pt discharged from [facility] on 5/1/2020 and had followed up with GI on 5/11 - not considered a transplant candidate due to illicit drug use, has low MELD score but considered decompensated.   Pt's emergency contact is her boyfriend. Lactulose was ordered TID per Gastroenterology.    Pt remained in ICU on 5/14 and was extubated on 5/15. Admitting diagnoses included Hepatic Encephalopathy and severe sepsis. Empiric ABX given and blood cultures NGTD. CXR and U/A were negative. Hospital Medicine to resume care of patient on the Telemetry unit. Ammonia level today is 23.     Review of Systems   Constitutional: Negative for chills and fever.   HENT: Negative.    Eyes: Negative.    Respiratory: Negative for cough and shortness of breath.    Cardiovascular: Negative for chest pain.   Gastrointestinal: Negative for abdominal pain, nausea and vomiting.   Genitourinary: Negative for dysuria and hematuria.   Skin: Negative.    Neurological: Negative for dizziness and headaches.        Generalized weakness   Psychiatric/Behavioral:        Confusion - resolved    Drug abuse       Physical  Exam   Constitutional: She is oriented to person, place, and time.   Appears chronically ill  Thin in stature - appears older than stated age   HENT:   Head: Normocephalic and atraumatic.   Eyes: Conjunctivae are normal.   Neck: Normal range of motion. Neck supple.   Cardiovascular: Normal rate.   Murmur heard.  Pulmonary/Chest: Effort normal. She has decreased breath sounds.   Abdominal: She exhibits distension.   ascites   Musculoskeletal:   Generalized weakness  Pt states she walks with a cane   Neurological: She is alert and oriented to person, place, and time. No cranial nerve deficit.   Skin: Skin is warm and dry.   Scattered areas of ecchymosis   Vitals reviewed.      Assessment/Plan:     * Hepatic coma/encephalopathy  Continue lactulose TID, can change from NGT to oral  Neuro checks    CAN LIKELY DISCHARGE ON 5/17/2020 - IF REMAINS STABLE  SHE STATES SHE WILL DISCHARGE HOME WITH HER DAUGHTER AND HER SIGNIFICANT OTHER    Severe sepsis with acute organ dysfunction  Blood cultures NGTD  On empiric Flagyl and Cefepime  U/A and CXR negative  Blood pressure appropriate, WBC - mild leukopenia  Afebrile      Cocaine abuse  UDS positive for cocaine on admit  Prior admissions UDS was positive for amphetamines  Counseled on need for cocaine cessation  Cont neuro monitoring      Chronic bilateral low back pain without sciatica  Hold narcotic due to encephalopathy        Hypokalemia  Replete and monitor      Tobacco abuse  Smoking cessation advised      Pancytopenia  Monitor H/H and platelet count  H/H 10.6/Hct 32.6 >>>> 9.8/31  Platelet count 111 >>>> 82      Cirrhosis of liver with ascites related to hepatitis C virus  Saw Dr. Green - pt is considered to have decompensated Liver cirrhosis  Decompensated HCV cirrhosis-decompensated but meld score has remained relatively low.  I was considering sending patient for transplant evaluation even with low meld but now she is not a transplant candidate given concern for  significant illicit drug use history  MELD-Na score: 14 at 5/16/2020  4:30 AM  MELD score: 14 at 5/16/2020  4:30 AM  Calculated from:  Serum Creatinine: 1.2 mg/dL at 5/16/2020  4:30 AM  Serum Sodium: 146 mmol/L (Rounded to 137 mmol/L) at 5/16/2020  4:30 AM  Total Bilirubin: 2.1 mg/dL at 5/16/2020  4:30 AM  INR(ratio): 1.3 at 5/16/2020  2:28 AM  Age: 59 years    Continue lactulose, lasix, rifaximin, spironolactone and lopressor      VTE Risk Mitigation (From admission, onward)         Ordered     enoxaparin injection 40 mg  Daily      05/13/20 0544                Critical care time spent on the evaluation and treatment of severe organ dysfunction, review of pertinent labs and imaging studies, discussions with consulting providers and discussions with patient/family: > 30 minutes.    Thank you for your consult. I will follow-up with patient. Please contact us if you have any additional questions.    Clarissa Dee NP  Department of Hospital Medicine   [facility] -

## 2020-05-16 NOTE — ASSESSMENT & PLAN NOTE
Saw Dr. Green - pt is considered to have decompensated Liver cirrhosis  Decompensated HCV cirrhosis-decompensated but meld score has remained relatively low.  I was considering sending patient for transplant evaluation even with low meld but now she is not a transplant candidate given concern for significant illicit drug use history  MELD-Na score: 14 at 5/16/2020  4:30 AM  MELD score: 14 at 5/16/2020  4:30 AM  Calculated from:  Serum Creatinine: 1.2 mg/dL at 5/16/2020  4:30 AM  Serum Sodium: 146 mmol/L (Rounded to 137 mmol/L) at 5/16/2020  4:30 AM  Total Bilirubin: 2.1 mg/dL at 5/16/2020  4:30 AM  INR(ratio): 1.3 at 5/16/2020  2:28 AM  Age: 59 years    Continue lactulose, lasix, rifaximin, spironolactone and lopressor

## 2020-05-16 NOTE — CONSULTS
[facility] Hospital Medicine  Consult Note    Patient Name: Jossy Siegel  MRN: [id number]  Admission Date: 5/12/2020  Hospital Length of Stay: 3 days  Attending Physician: Rodo Davison MD  Primary Care Provider: Primary Doctor No           Patient information was obtained from patient, past medical records and ER records.     Consults  Subjective:     Principal Problem: Hepatic coma/encephalopathy    Chief Complaint:   Chief Complaint   Patient presents with    Fatigue     lethargic on scene per EMS, also report fever        HPI: Patient is a 59-year-old  female with a past medical history of liver cirrhosis, hep C, drug abuse, medication noncompliance presents to the ED due to lethargy.   She was intubated on arrival as there were concerns for airway protection. Ammonia level was 372 , UDS + cocaine. Pt discharged from [facility] on 5/1/2020 and had followed up with GI on 5/11 - not considered a transplant candidate due to illicit drug use, has low MELD score but considered decompensated.   Pt's emergency contact is her boyfriend. Lactulose was ordered TID per Gastroenterology.    Pt remained in ICU on 5/14 and was extubated on 5/15. Admitting diagnoses included Hepatic Encephalopathy and severe sepsis. Empiric ABX given and blood cultures NGTD. CXR and U/A were negative. Hospital Medicine to resume care of patient on the Telemetry unit. Ammonia level today is 23.     Review of Systems   Constitutional: Negative for chills and fever.   HENT: Negative.    Eyes: Negative.    Respiratory: Negative for cough and shortness of breath.    Cardiovascular: Negative for chest pain.   Gastrointestinal: Negative for abdominal pain, nausea and vomiting.   Genitourinary: Negative for dysuria and hematuria.   Skin: Negative.    Neurological: Negative for dizziness and headaches.        Generalized weakness   Psychiatric/Behavioral:        Confusion - resolved    Drug abuse       Physical  Exam   Constitutional: She is oriented to person, place, and time.   Appears chronically ill  Thin in stature - appears older than stated age   HENT:   Head: Normocephalic and atraumatic.   Eyes: Conjunctivae are normal.   Neck: Normal range of motion. Neck supple.   Cardiovascular: Normal rate.   Murmur heard.  Pulmonary/Chest: Effort normal. She has decreased breath sounds.   Abdominal: She exhibits distension.   ascites   Musculoskeletal:   Generalized weakness  Pt states she walks with a cane   Neurological: She is alert and oriented to person, place, and time. No cranial nerve deficit.   Skin: Skin is warm and dry.   Scattered areas of ecchymosis   Vitals reviewed.      Assessment/Plan:     * Hepatic coma/encephalopathy  Continue lactulose TID, can change from NGT to oral  Neuro checks    CAN LIKELY DISCHARGE ON 5/17/2020 - IF REMAINS STABLE  SHE STATES SHE WILL DISCHARGE HOME WITH HER DAUGHTER AND HER SIGNIFICANT OTHER    Severe sepsis with acute organ dysfunction  Blood cultures NGTD  On empiric Flagyl and Cefepime  U/A and CXR negative  Blood pressure appropriate, WBC - mild leukopenia  Afebrile      Cocaine abuse  UDS positive for cocaine on admit  Prior admissions UDS was positive for amphetamines  Counseled on need for cocaine cessation  Cont neuro monitoring      Chronic bilateral low back pain without sciatica  Hold narcotic due to encephalopathy        Hypokalemia  Replete and monitor      Tobacco abuse  Smoking cessation advised      Pancytopenia  Monitor H/H and platelet count  H/H 10.6/Hct 32.6 >>>> 9.8/31  Platelet count 111 >>>> 82      Cirrhosis of liver with ascites related to hepatitis C virus  Saw Dr. Green - pt is considered to have decompensated Liver cirrhosis  Decompensated HCV cirrhosis-decompensated but meld score has remained relatively low.  I was considering sending patient for transplant evaluation even with low meld but now she is not a transplant candidate given concern for  significant illicit drug use history  MELD-Na score: 14 at 5/16/2020  4:30 AM  MELD score: 14 at 5/16/2020  4:30 AM  Calculated from:  Serum Creatinine: 1.2 mg/dL at 5/16/2020  4:30 AM  Serum Sodium: 146 mmol/L (Rounded to 137 mmol/L) at 5/16/2020  4:30 AM  Total Bilirubin: 2.1 mg/dL at 5/16/2020  4:30 AM  INR(ratio): 1.3 at 5/16/2020  2:28 AM  Age: 59 years    Continue lactulose, lasix, rifaximin, spironolactone and lopressor      VTE Risk Mitigation (From admission, onward)         Ordered     enoxaparin injection 40 mg  Daily      05/13/20 0544                Critical care time spent on the evaluation and treatment of severe organ dysfunction, review of pertinent labs and imaging studies, discussions with consulting providers and discussions with patient/family: > 30 minutes.    Thank you for your consult. I will follow-up with patient. Please contact us if you have any additional questions.    Clarissa eDe NP  Department of Hospital Medicine   [facility] -

## 2020-05-16 NOTE — SUBJECTIVE & OBJECTIVE
Review of Systems   Constitutional: Positive for malaise/fatigue. Negative for chills and fever.   HENT: Negative for congestion.    Eyes: Negative for blurred vision.   Respiratory: Negative for cough, sputum production and shortness of breath.    Cardiovascular: Negative for chest pain and leg swelling.   Gastrointestinal: Negative for abdominal pain, nausea and vomiting.   Genitourinary: Negative for dysuria.   Musculoskeletal: Negative for myalgias.   Skin: Negative for rash.   Neurological: Negative for dizziness, weakness and headaches.   Endo/Heme/Allergies: Does not bruise/bleed easily.   Psychiatric/Behavioral: The patient is not nervous/anxious.          Objective:     Vital Signs (Most Recent):  Temp: 98.7 °F (37.1 °C) (05/16/20 0330)  Pulse: 90 (05/16/20 0803)  Resp: 16 (05/16/20 0803)  BP: 117/80 (05/16/20 0500)  SpO2: 100 % (05/16/20 0803) Vital Signs (24h Range):  Temp:  [97.8 °F (36.6 °C)-98.7 °F (37.1 °C)] 98.7 °F (37.1 °C)  Pulse:  [] 90  Resp:  [13-25] 16  SpO2:  [91 %-100 %] 100 %  BP: ()/(45-95) 117/80     Weight: 60.3 kg (133 lb)  Body mass index is 24.33 kg/m².      Intake/Output Summary (Last 24 hours) at 5/16/2020 1033  Last data filed at 5/16/2020 0500  Gross per 24 hour   Intake 100 ml   Output 1165 ml   Net -1065 ml       Physical Exam   Constitutional: She is oriented to person, place, and time. Vital signs are normal. She appears well-developed. She is cooperative.  Non-toxic appearance. She has a sickly appearance. She appears ill. No distress.   HENT:   Head: Normocephalic and atraumatic.   Mouth/Throat: Oropharynx is clear and moist and mucous membranes are normal.   Eyes: Pupils are equal, round, and reactive to light. EOM and lids are normal.   Neck: Trachea normal and full passive range of motion without pain. Carotid bruit is not present.   Cardiovascular: Normal rate, regular rhythm and normal heart sounds.   Pulses:       Radial pulses are 2+ on the right side, and  2+ on the left side.        Dorsalis pedis pulses are 2+ on the right side, and 2+ on the left side.   Pulmonary/Chest: Effort normal and breath sounds normal. No accessory muscle usage. No tachypnea. No respiratory distress.   Abdominal: Soft. She exhibits ascites (mild). She exhibits no distension. Bowel sounds are decreased. There is no tenderness.   Genitourinary:   Genitourinary Comments: House in position    Musculoskeletal: Normal range of motion.        Right foot: There is no deformity.        Left foot: There is no deformity.   No edema.  Mild to mod atrophy   Lymphadenopathy:     She has no cervical adenopathy.   Neurological: She is alert and oriented to person, place, and time.   Some confusion at times getting OOB without assistance   Skin: Skin is warm, dry and intact. Capillary refill takes less than 2 seconds. No rash noted. No cyanosis.   Psychiatric: Her speech is normal. Thought content is delusional. She expresses impulsivity. She exhibits abnormal recent memory.       Vents:  Vent Mode: Spont (05/15/20 0739)  Ventilator Initiated: Yes (05/13/20 0309)  Set Rate: 0 BPM (05/15/20 0739)  Vt Set: 0 mL (05/15/20 0739)  Pressure Support: 5 cmH20 (05/15/20 0739)  PEEP/CPAP: 5 cmH20 (05/15/20 0739)  Oxygen Concentration (%): 36 (05/15/20 0835)  Peak Airway Pressure: 14 cmH2O (05/15/20 0739)  Plateau Pressure: 0 cmH20 (05/15/20 0739)  Total Ve: 3.53 mL (05/15/20 0739)  F/VT Ratio<105 (RSBI): (!) 35.71 (05/15/20 0739)    Lines/Drains/Airways     Drain                 Urethral Catheter 05/13/20 0329 Latex 16 Fr. 3 days          Peripheral Intravenous Line                 Peripheral IV - Single Lumen 05/15/20 1000 18 G Anterior;Left;Proximal Forearm 1 day                Significant Labs:    CBC/Anemia Profile:  Recent Labs   Lab 05/15/20  0755 05/16/20  0431   WBC 4.24 2.60*   HGB 10.3* 9.8*   HCT 32.0* 31.0*   * 82*   * 101*   RDW 16.5* 16.2*        Chemistries:  Recent Labs   Lab  05/15/20  0755 05/16/20  0430   * 146*   K 3.7 3.1*   * 117*   CO2 17* 22*   BUN 23 28*   CREATININE 1.4 1.2   CALCIUM 8.1* 7.8*   ALBUMIN 2.3* 2.3*   PROT 7.0 6.8   BILITOT 1.5* 2.1*   ALKPHOS 129 131   ALT 27 29   AST 44* 54*   MG 1.9 2.0       Coagulation:   Recent Labs   Lab 05/16/20 0228   INR 1.3*     All pertinent labs within the past 24 hours have been reviewed.    Significant Imaging:  CXR: I have reviewed all pertinent results/findings within the past 24 hours and my personal findings are:  no acute pulm process appreciated

## 2020-05-16 NOTE — ASSESSMENT & PLAN NOTE
Blood cultures NGTD  On empiric Flagyl and Cefepime  U/A and CXR negative  Blood pressure appropriate, WBC - mild leukopenia  Afebrile

## 2020-05-16 NOTE — PLAN OF CARE
Patient resting on room air. Patient has bouts of clarity and will answer questions appropriately but has to be re-oriented frequently. She has also tried to get out of bed, the O2 sat probe has been replaced numeral times, and she would not keep still in bed and pulled her TERENCE out twice during shift. It was not replaced the second time. Patient doesn't recall why she is at hospital and will repeat questions/requests. House is patent and draining with UOP low 20-30cc/hr. Periph iv is patent and saline locked between antibiotics. Gave update to daughter.

## 2020-05-16 NOTE — HPI
Patient is a 59-year-old  female with a past medical history of liver cirrhosis, hep C, drug abuse, medication noncompliance presents to the ED due to lethargy.  She was intubated on arrival as there were concerns for airway protection. Ammonia level was 372 , UDS + cocaine. Pt discharged from [facility] on 5/1/2020 and had followed up with GI on 5/11 - not considered a transplant candidate due to illicit drug use, has low MELD score but considered decompensated.   Pt's emergency contact is her boyfriend. Lactulose was ordered TID per Gastroenterology.    Pt remained in ICU on 5/14 and was extubated on 5/15. Admitting diagnoses included Hepatic Encephalopathy and severe sepsis. Empiric ABX given and blood cultures NGTD. CXR and U/A were negative. Hospital Medicine to resume care of patient on the Telemetry unit. Ammonia level today is 23.

## 2020-05-17 VITALS
RESPIRATION RATE: 18 BRPM | HEIGHT: 62 IN | HEART RATE: 92 BPM | TEMPERATURE: 98 F | BODY MASS INDEX: 24.48 KG/M2 | SYSTOLIC BLOOD PRESSURE: 127 MMHG | DIASTOLIC BLOOD PRESSURE: 72 MMHG | OXYGEN SATURATION: 100 % | WEIGHT: 133 LBS

## 2020-05-17 LAB
ALBUMIN SERPL BCP-MCNC: 2.3 G/DL (ref 3.5–5.2)
ALP SERPL-CCNC: 141 U/L (ref 55–135)
ALT SERPL W/O P-5'-P-CCNC: 27 U/L (ref 10–44)
AMMONIA PLAS-SCNC: 35 UMOL/L (ref 10–50)
ANION GAP SERPL CALC-SCNC: 4 MMOL/L (ref 8–16)
AST SERPL-CCNC: 55 U/L (ref 10–40)
BASOPHILS # BLD AUTO: 0.04 K/UL (ref 0–0.2)
BASOPHILS NFR BLD: 1.4 % (ref 0–1.9)
BILIRUB SERPL-MCNC: 1.2 MG/DL (ref 0.1–1)
BUN SERPL-MCNC: 16 MG/DL (ref 8–23)
CALCIUM SERPL-MCNC: 7.8 MG/DL (ref 8.7–10.5)
CHLORIDE SERPL-SCNC: 113 MMOL/L (ref 95–110)
CO2 SERPL-SCNC: 22 MMOL/L (ref 23–29)
CREAT SERPL-MCNC: 0.9 MG/DL (ref 0.5–1.4)
DIFFERENTIAL METHOD: ABNORMAL
EOSINOPHIL # BLD AUTO: 0.2 K/UL (ref 0–0.5)
EOSINOPHIL NFR BLD: 5.1 % (ref 0–8)
ERYTHROCYTE [DISTWIDTH] IN BLOOD BY AUTOMATED COUNT: 16 % (ref 11.5–14.5)
EST. GFR  (AFRICAN AMERICAN): >60 ML/MIN/1.73 M^2
EST. GFR  (NON AFRICAN AMERICAN): >60 ML/MIN/1.73 M^2
GLUCOSE SERPL-MCNC: 94 MG/DL (ref 70–110)
HCT VFR BLD AUTO: 32.9 % (ref 37–48.5)
HGB BLD-MCNC: 10.6 G/DL (ref 12–16)
IMM GRANULOCYTES # BLD AUTO: 0.06 K/UL (ref 0–0.04)
IMM GRANULOCYTES NFR BLD AUTO: 2 % (ref 0–0.5)
LYMPHOCYTES # BLD AUTO: 0.7 K/UL (ref 1–4.8)
LYMPHOCYTES NFR BLD: 23.9 % (ref 18–48)
MAGNESIUM SERPL-MCNC: 2 MG/DL (ref 1.6–2.6)
MCH RBC QN AUTO: 32.2 PG (ref 27–31)
MCHC RBC AUTO-ENTMCNC: 32.2 G/DL (ref 32–36)
MCV RBC AUTO: 100 FL (ref 82–98)
MONOCYTES # BLD AUTO: 0.6 K/UL (ref 0.3–1)
MONOCYTES NFR BLD: 20.1 % (ref 4–15)
NEUTROPHILS # BLD AUTO: 1.4 K/UL (ref 1.8–7.7)
NEUTROPHILS NFR BLD: 47.5 % (ref 38–73)
NRBC BLD-RTO: 0 /100 WBC
PLATELET # BLD AUTO: 52 K/UL (ref 150–350)
PLATELET BLD QL SMEAR: ABNORMAL
PMV BLD AUTO: 12.3 FL (ref 9.2–12.9)
POTASSIUM SERPL-SCNC: 4.2 MMOL/L (ref 3.5–5.1)
PROT SERPL-MCNC: 6.9 G/DL (ref 6–8.4)
RBC # BLD AUTO: 3.29 M/UL (ref 4–5.4)
SODIUM SERPL-SCNC: 139 MMOL/L (ref 136–145)
WBC # BLD AUTO: 2.93 K/UL (ref 3.9–12.7)

## 2020-05-17 PROCEDURE — 85025 COMPLETE CBC W/AUTO DIFF WBC: CPT

## 2020-05-17 PROCEDURE — 97110 THERAPEUTIC EXERCISES: CPT

## 2020-05-17 PROCEDURE — 63600175 PHARM REV CODE 636 W HCPCS: Performed by: NURSE PRACTITIONER

## 2020-05-17 PROCEDURE — 97530 THERAPEUTIC ACTIVITIES: CPT

## 2020-05-17 PROCEDURE — 80053 COMPREHEN METABOLIC PANEL: CPT

## 2020-05-17 PROCEDURE — 94761 N-INVAS EAR/PLS OXIMETRY MLT: CPT

## 2020-05-17 PROCEDURE — 83735 ASSAY OF MAGNESIUM: CPT

## 2020-05-17 PROCEDURE — 97162 PT EVAL MOD COMPLEX 30 MIN: CPT

## 2020-05-17 PROCEDURE — 36415 COLL VENOUS BLD VENIPUNCTURE: CPT

## 2020-05-17 PROCEDURE — 82140 ASSAY OF AMMONIA: CPT

## 2020-05-17 PROCEDURE — 25000003 PHARM REV CODE 250: Performed by: NURSE PRACTITIONER

## 2020-05-17 RX ORDER — FAMOTIDINE 20 MG/1
20 TABLET, FILM COATED ORAL 2 TIMES DAILY
Status: DISCONTINUED | OUTPATIENT
Start: 2020-05-17 | End: 2020-05-17 | Stop reason: HOSPADM

## 2020-05-17 RX ADMIN — METOPROLOL TARTRATE 12.5 MG: 25 TABLET, FILM COATED ORAL at 09:05

## 2020-05-17 RX ADMIN — METRONIDAZOLE 500 MG: 500 TABLET, FILM COATED ORAL at 12:05

## 2020-05-17 RX ADMIN — METRONIDAZOLE 500 MG: 500 TABLET, FILM COATED ORAL at 05:05

## 2020-05-17 RX ADMIN — FUROSEMIDE 20 MG: 20 TABLET ORAL at 09:05

## 2020-05-17 RX ADMIN — SPIRONOLACTONE 50 MG: 25 TABLET ORAL at 09:05

## 2020-05-17 RX ADMIN — CEFEPIME 2 G: 2 INJECTION, POWDER, FOR SOLUTION INTRAVENOUS at 01:05

## 2020-05-17 RX ADMIN — LACTULOSE 10 G: 20 SOLUTION ORAL at 09:05

## 2020-05-17 RX ADMIN — FAMOTIDINE 20 MG: 20 TABLET ORAL at 09:05

## 2020-05-17 RX ADMIN — CEFEPIME 2 G: 2 INJECTION, POWDER, FOR SOLUTION INTRAVENOUS at 12:05

## 2020-05-17 NOTE — DISCHARGE SUMMARY
[facility] Hospital Medicine  Discharge Summary      Patient Name: Jossy Siegel  MRN: [id number]  Admission Date: 5/12/2020  Hospital Length of Stay: 4 days  Discharge Date and Time:  05/17/2020 12:54 PM  Attending Physician: Rodo Traylor, *   Discharging Provider: SEVERINO Farrell  Primary Care Provider: Primary Doctor No      HPI:   Patient is a 59-year-old  female with a past medical history of liver cirrhosis, hep C, drug abuse, medication noncompliance presents to the ED due to lethargy.   She was intubated on arrival as there were concerns for airway protection. Ammonia level was 372 , UDS + cocaine. Pt discharged from [facility] on 5/1/2020 and had followed up with GI on 5/11 - not considered a transplant candidate due to illicit drug use, has low MELD score but considered decompensated.   Pt's emergency contact is her boyfriend. Lactulose was ordered TID per Gastroenterology.    Pt remained in ICU on 5/14 and was extubated on 5/15. Admitting diagnoses included Hepatic Encephalopathy and severe sepsis. Empiric ABX given and blood cultures NGTD. CXR and U/A were negative. Hospital Medicine to resume care of patient on the Telemetry unit. Ammonia level today is 23.     * No surgery found *      Hospital Course:   Patient is A, A, & O X 3 today and ammonia level is WNL (35). PT ordered and patient needs walker and stand by assist of 1 person for ambulation. Patient reports she lives with her boyfriend, and he is coming to get her. She will take her lactulose as ordered and ensure that she has 2-3 BM's daily and will f/u with Dr. Green, OP. Spoke with pts daughter over the phone who said she (the daughter) is not capable of taking care of patient. I explained that her options would be nursing home placement, and the daughter said the patient would never agree to that. The patient verbalized understanding of all and refuses to consider SNF or NH placement at  this time. Walker ordered and referral to case management to deliver walker to patient.     Consults:   Consults (From admission, onward)        Status Ordering Provider     Inpatient consult to Hospitalist  Once     Provider:  Gabi Gutierrez MD    Acknowledged KESHAWN PAINTING     Inpatient consult to Registered Dietitian/Nutritionist  Once     Provider:  (Not yet assigned)    Completed GABI GUTIERREZ     Inpatient consult to Social Work  Once     Provider:  (Not yet assigned)    Acknowledged POLO JONES     Inpatient consult to Social Work  Once     Provider:  (Not yet assigned)    Acknowledged POLO JONES          No new Assessment & Plan notes have been filed under this hospital service since the last note was generated.  Service: Hospital Medicine    Final Active Diagnoses:    Diagnosis Date Noted POA    PRINCIPAL PROBLEM:  Hepatic coma/encephalopathy [K72.91] 05/13/2020 Yes    Severe sepsis with acute organ dysfunction [A41.9, R65.20] 05/14/2020 Yes    Cocaine abuse [F14.10] 05/13/2020 Yes    Chronic bilateral low back pain without sciatica [M54.5, G89.29] 04/23/2020 Yes     Chronic    Hypokalemia [E87.6] 04/23/2020 Yes    Tobacco abuse [Z72.0] 04/22/2020 Yes     Chronic    Cirrhosis of liver with ascites related to hepatitis C virus [K74.60, R18.8] 02/17/2020 Yes     Chronic    Pancytopenia [D61.818] 02/05/2020 Yes     Chronic      Problems Resolved During this Admission:    Diagnosis Date Noted Date Resolved POA    On mechanically assisted ventilation [Z99.11] 05/15/2020 05/16/2020 Not Applicable       Discharged Condition: stable    Disposition: Home or Self Care    Follow Up:  Follow-up Information     Alma Delia Green MD In 2 weeks.    Specialties:  Gastroenterology, Hepatology  Why:  hospital follow up  Contact information:  [address]  [place] [address] [phone]                 Patient Instructions:      WALKER FOR HOME USE     Order Specific Question Answer Comments   Type of  "Walker: Adult (5'4"-6'6")    With wheels? Yes    Height: 5' 2" (1.575 m)    Weight: 60.3 kg (133 lb)    Length of need (1-99 months): 99    Does patient have medical equipment at home? walker, standard    Does patient have medical equipment at home? cane, quad    Does patient have medical equipment at home? bedside commode    Please check all that apply: Patient is unable to safely ambulate without equipment.      Diet Cardiac     Notify your health care provider if you experience any of the following:  increased confusion or weakness     Notify your health care provider if you experience any of the following:  persistent dizziness, light-headedness, or visual disturbances     Notify your health care provider if you experience any of the following:  temperature >100.4     Activity as tolerated       Significant Diagnostic Studies: Labs:   BMP:   Recent Labs   Lab 05/16/20 0430 05/17/20 0519   GLU 87 94   * 139   K 3.1* 4.2   * 113*   CO2 22* 22*   BUN 28* 16   CREATININE 1.2 0.9   CALCIUM 7.8* 7.8*   MG 2.0 2.0   , CMP   Recent Labs   Lab 05/16/20 0430 05/17/20 0519   * 139   K 3.1* 4.2   * 113*   CO2 22* 22*   GLU 87 94   BUN 28* 16   CREATININE 1.2 0.9   CALCIUM 7.8* 7.8*   PROT 6.8 6.9   ALBUMIN 2.3* 2.3*   BILITOT 2.1* 1.2*   ALKPHOS 131 141*   AST 54* 55*   ALT 29 27   ANIONGAP 7* 4*   ESTGFRAFRICA 56* >60   EGFRNONAA 49* >60   , CBC   Recent Labs   Lab 05/16/20 0431 05/17/20 0519   WBC 2.60* 2.93*   HGB 9.8* 10.6*   HCT 31.0* 32.9*   PLT 82* 52*    and All labs within the past 24 hours have been reviewed    Pending Diagnostic Studies:     None         Medications:  Reconciled Home Medications:      Medication List      CONTINUE taking these medications    albuterol 90 mcg/actuation inhaler  Commonly known as:  PROVENTIL/VENTOLIN HFA  Inhale 2 puffs into the lungs every 6 (six) hours.     amitriptyline 25 MG tablet  Commonly known as:  ELAVIL  Take 25 mg by mouth every evening.   "   ciprofloxacin HCl 500 MG tablet  Commonly known as:  CIPRO  Take 1 tablet (500 mg total) by mouth once a week.     cyclobenzaprine 10 MG tablet  Commonly known as:  FLEXERIL  TAKE 1 TABLET BY MOUTH 3 (THREE) TIMES DAILY AS NEEDED FOR MUSCLE SPASMS (PAIN) FOR UP TO 10 DAYS.     furosemide 40 MG tablet  Commonly known as:  LASIX  Take 1 tablet (40 mg total) by mouth 2 (two) times daily.     lactulose 10 gram/15 ml 10 gram/15 mL (15 mL) solution  Commonly known as:  CHRONULAC  Take 15 mLs (10 g total) by mouth 3 (three) times daily.     metoprolol tartrate 25 MG tablet  Commonly known as:  LOPRESSOR  Take 1 tablet (25 mg total) by mouth 2 (two) times daily.     ondansetron 4 MG tablet  Commonly known as:  ZOFRAN  TAKE 1 TABLET BY MOUTH EVERY 8 (EIGHT) HOURS AS NEEDED FOR NAUSEA FORUP TO 7 DAYS.     ondansetron 4 MG Tbdl  Commonly known as:  ZOFRAN-ODT  Take 1 tablet (4 mg total) by mouth every 8 (eight) hours as needed (nausea).     pantoprazole 40 MG tablet  Commonly known as:  PROTONIX  Take 40 mg by mouth 2 (two) times daily.     rifAXImin 200 mg Tab  Commonly known as:  XIFAXAN  TAKE 1 TABLET BY MOUTH 2 (TWO) TIMES DAILY BEFORE MEALS FOR 14 DAYS.     spironolactone 100 MG tablet  Commonly known as:  ALDACTONE  Take 1 tablet (100 mg total) by mouth 2 (two) times daily.            Indwelling Lines/Drains at time of discharge:   Lines/Drains/Airways     None                 Time spent on the discharge of patient: 60 minutes  Patient was seen and examined on the date of discharge and determined to be suitable for discharge.         RAUL Farrell-DIA  Department of Hospital Medicine  [facility]

## 2020-05-17 NOTE — PROGRESS NOTES
Pharmacist Renal Dose Adjustment Note    Jossy Siegel is a 59 y.o. female being treated with the medication famotidine     Patient Data:    Vital Signs (Most Recent):  Temp: 97.8 °F (36.6 °C) (05/17/20 0721)  Pulse: 74 (05/17/20 0721)  Resp: 18 (05/17/20 0721)  BP: 132/74 (05/17/20 0721)  SpO2: 98 % (05/17/20 0721) Vital Signs (72h Range):  Temp:  [96.6 °F (35.9 °C)-99 °F (37.2 °C)]   Pulse:  []   Resp:  [13-25]   BP: ()/(42-95)   SpO2:  [91 %-100 %]      Recent Labs   Lab 05/15/20  0755 05/16/20  0430 05/17/20  0519   CREATININE 1.4 1.2 0.9     Serum creatinine: 0.9 mg/dL 05/17/20 0519  Estimated creatinine clearance: 56.2 mL/min    Per protocol for CrCl > 50 ml/min, dose will be increased from 20 mg PO once daily to 20 mg PO twice daily.     Pharmacist's Name: Katherine E Mcardle  Pharmacist's Extension: [phone]

## 2020-05-17 NOTE — PLAN OF CARE
Fall precautions maintained. Pt free from injuries/fall.  Repositions and ambulates with assist x1.  Awaiting DME for discharge.  Bed locked and low, side rails up x 2, phone and call light w/in reach.   POC and meds discussed, pt verbalized understanding.   Chart check done. Will cont to monitor.

## 2020-05-17 NOTE — PT/OT/SLP EVAL
Physical Therapy Evaluation    Patient Name:  Jossy Siegel   MRN:  [id number]    Recommendations:     Discharge Recommendations:  home health PT, home(needs 24hr spv for safety r/t inc fall risk)   Discharge Equipment Recommendations: walker, rolling   Barriers to discharge: Decreased caregiver support    Assessment:     Jossy Siegel is a 59 y.o. female admitted with a medical diagnosis of Hepatic coma/encephalopathy.  She presents with the following impairments/functional limitations:  weakness, impaired functional mobilty, impaired cognition, decreased safety awareness, decreased coordination, gait instability, impaired endurance, impaired balance, impaired self care skills, decreased lower extremity function.    Rehab Prognosis: Fair; patient would benefit from acute skilled PT services to address these deficits and reach maximum level of function.    Recent Surgery: * No surgery found *      Plan:     During this hospitalization, patient to be seen 5 x/week to address the identified rehab impairments via therapeutic exercises, therapeutic activities, gait training and progress toward the following goals:    · Plan of Care Expires:  05/24/20    Subjective     Chief Complaint: unsteady gt; weakness  Patient/Family Comments/goals: to go home; return to plof; walk without fear of falling  Pain/Comfort:  · Pain Rating 1: 0/10    Patients cultural, spiritual, Taoism conflicts given the current situation:      Living Environment:  Lives alone - says has a boyfriend but alone a lot; no steps to enter one story home  Prior to admission, patients level of function was mod indep.  Equipment used at home: walker, standard, cane, quad, bedside commode, shower chair.  DME owned (not currently used): standard walker.  Upon discharge, patient will have assistance from ???/rec home with family spv and hh services; needs to use RW always to dec fall risk.    Objective:     Communicated with RN prior to  session.  Patient found HOB elevated with bed alarm, telemetry(avasys)  upon PT entry to room.    General Precautions: Standard, fall   Orthopedic Precautions:N/A   Braces: N/A     Exams:  · B LE strength 4-/5 grossly and rom wfl    Functional Mobility:  · Bed Mobility - supine to/from sit sba and cues for log-rolling technique  · Transfers - sit to/from stand with RW and min/cga for safety r/t leaned back in initial stance; toilet t/f min/cga w/grab bar  · Gt - Amb with RW x 70ft RW min/cga for safety      Therapeutic Activities and Exercises:   PT educated patient on POC, B LE TE to prep mobility and safety/fall precautions with mobility using RW    AM-PAC 6 CLICK MOBILITY  Total Score:18     Patient left HOB elevated with all lines intact, call button in reach, bed alarm on and RN notified.    GOALS:   Multidisciplinary Problems     Physical Therapy Goals     Not on file                History:     Past Medical History:   Diagnosis Date    Alcoholic cirrhosis of liver with ascites     Cirrhosis     Hepatitis     Pancytopenia 2/5/2020    Last Assessment & Plan:  History & Physical Chronic.  Stable.  Follow-up repeat CBC and INR in a.m.  Discharge Summary  Chronic.  Stable. Likely secondary to cirrhosis. Follow-up  Chronic.  Stable. Likely secondary to cirrhosis.       Past Surgical History:   Procedure Laterality Date    PARACENTESIS         Time Tracking:     PT Received On: 05/17/20  PT Start Time: 1205     PT Stop Time: 1245  PT Total Time (min): 40 min     Billable Minutes: Evaluation 15, Therapeutic Activity 15 and Therapeutic Exercise 10      Florin Morrison, PT  05/17/2020

## 2020-05-17 NOTE — HOSPITAL COURSE
Patient is A, A, & O X 3 today and ammonia level is WNL (35). PT ordered and patient needs walker and stand by assist of 1 person for ambulation. Patient reports she lives with her boyfriend, and he is coming to get her. She will take her lactulose as ordered and ensure that she has 2-3 BM's daily and will f/u with Dr. Green, OP. Spoke with pts daughter over the phone who said she (the daughter) is not capable of taking care of patient. I explained that her options would be nursing home placement, and the daughter said the patient would never agree to that. The patient verbalized understanding of all and refuses to consider SNF or NH placement at this time. Walker ordered and referral to case management to deliver walker to patient.

## 2020-05-17 NOTE — PLAN OF CARE
Patient remained free from injury. Tele-sitter in use. Bed alarm set. IV abx maintained. No s/s of acute distress. 12hr chart check complete.

## 2020-05-17 NOTE — PLAN OF CARE
Discharge instructions reviewed with pt who verbalized understanding with teachback. Walker delivered to room. Iv removed tip intact. Awaiting family transportation.

## 2020-05-18 LAB
BACTERIA BLD CULT: NORMAL
BACTERIA BLD CULT: NORMAL

## 2020-05-20 RX ORDER — PANTOPRAZOLE SODIUM 40 MG/1
40 TABLET, DELAYED RELEASE ORAL 2 TIMES DAILY
Qty: 60 TABLET | Refills: 2 | Status: SHIPPED | OUTPATIENT
Start: 2020-05-20 | End: 2020-11-16

## 2020-05-20 NOTE — PHYSICIAN QUERY
PT Name: Jossy Siegel  MR #: [id number]     Physician Query Form - Documentation Clarification      CDS/: Ada Garcia RN, CDS               Contact information: [email or web address]    This form is a permanent document in the medical record.     Query Date: May 20, 2020    By submitting this query, we are merely seeking further clarification of documentation. Please utilize your independent clinical judgment when addressing the question(s) below.    The Medical Record reflects the following:    Clinical Findings Location in Medical Record    59-year-old  female with a past medical history of liver cirrhosis, hep C, drug abuse, medication noncompliance presents to the ED due to lethargy     Patient febrile, tachycardic. Covid-19 testing negative. Possible LLL infiltrate on chest xray. Will treat for sepsis secondary to pna. Start vanc and cefepime. Blood/sputum cultures ordered. Procalcitonin added    ID  Severe sepsis with acute organ dysfunction  CXR on admit atelectasis  Febrile 102 in ED but Afebrile since admit  Blood cultures X 2 NGTD  Normal WBC  Cont Cefepime and Flagyl  Stop Vanc  Follow fever curve  Repeat CXR in AM  UA neg on admit    Repeat CXR this AM no acute infectious process appreciated    Severe sepsis with acute organ dysfunction  Blood cultures NGTD  On empiric Flagyl and Cefepime  U/A and CXR negative  Blood pressure appropriate, WBC - mild leukopenia  Afebrile     Admitting diagnoses included Hepatic Encephalopathy and severe sepsis. Empiric ABX given and blood cultures NGTD. CXR and U/A were negative.   H&P 5/13           Significant Event 5/13          Pulmonology PN 5/15                         Pulmonology PN 5/16     HM Consult 5/16              DC Summary 5/17    WBC: 3.35 --> 9.40 --> 4.24 --> 2.60 --> 2.93    Lactate: 2.2    Procalcitonin: 0.11    Blood Culture, Routine No Growth after 4 days.     Oncology  Pancytopenia  Chronic   5/12- 5/17    Labs  5/12    Labs 5/14    Labs 5/14    Pulmonology PN 5/16    Vital Signs (24h Range):  Temp:  [97.8 °F (36.6 °C)] 97.8 °F (36.6 °C)  Pulse:  [] 116  Resp:  [16-36] 22  BP: (101-152)/(52-76) 128/59    Vital Signs (24h Range):  Temp:  [98.3 °F (36.8 °C)-102.8 °F (39.3 °C)] 98.3 °F (36.8 °C)  Pulse:  [] 75  Resp:  [13-29] 15  BP: (104-176)/(53-91) 124/64    Vital Signs (24h Range):  Temp:  [97.7 °F (36.5 °C)-98.4 °F (36.9 °C)] 97.7 °F (36.5 °C)  Pulse:  [] 104  Resp:  [14-23] 20  BP: ()/(46-76) 108/51    Vital Signs (24h Range):  Temp:  [97.8 °F (36.6 °C)-98.7 °F (37.1 °C)] 98.7 °F (37.1 °C)  Pulse:  [] 90  Resp:  [13-25] 16  BP: ()/(45-95) 117/80 H&P 5/13            Pulmonology PN 5/14              Pulmonology PN 5/15          Pulmonology PN 5/16                                                                              Please clarify the diagnosis of _sepsis_      Provider Use Only  [  ] Diagnosis ruled in and additional clinical support/ decision making indicators for the diagnosis include (specify):_________________________________  [  ] Above stated diagnosis has been ruled out  [  ] Above stated diagnosis has been ruled out, other diagnosis ruled in (specify):___________  [ x ] Other clarification (specify): __No sepsis . SIRS with no infectious etiology __, Metabolic encephalopathy __________  [  ] Clinically undetermined     Present on admission (POA) status:   [   ] Yes (Y)                          [   ] Clinically Undetermined (W)  [   ] No (N)                            [   ] Documentation insufficient to determine if condition is POA (U)

## 2020-05-20 NOTE — PHYSICIAN QUERY
PT Name: Jossy Siegel  MR #: [id number]     Physician Query Form - Documentation Clarification      CDS/: Ada Garcia RN, CDS               Contact information: [email or web address]      This form is a permanent document in the medical record.     Query Date: May 20, 2020    By submitting this query, we are merely seeking further clarification of documentation. Please utilize your independent clinical judgment when addressing the question(s) below.    The Medical record reflects the following:    Supporting Clinical Findings Location in Medical Record   59-year-old  female with a past medical history of liver cirrhosis, hep C, drug abuse, medication noncompliance presents to the ED due to lethargy    Patient febrile, tachycardic. Covid-19 testing negative. Possible LLL infiltrate on chest xray. Will treat for sepsis secondary to pna. Start vanc and cefepime    Pulmonary  Pneumonia, unspecified organism  5/14 await cx , Add flagyl to cefepime , Tmax 102.8 overnight     CXR on admit atelectasis  Febrile 102 in ED but Afebrile since admit  Blood cultures X 2 NGTD  Normal WBC  Cont Cefepime and Flagyl  Stop Vanc      Cont Cefepime and Flagyl X 5 days then stop  Repeat CXR this AM no acute infectious process appreciated    Admitting diagnoses included Hepatic Encephalopathy and severe sepsis. Empiric ABX given and blood cultures NGTD. CXR and U/A were negative   H&P 5/13        Significant Event 5/13         Pulmonology PN 5/14        Pulmonology PN 5/15              Pulmonology PN 5/16          HM Consult 5/16    The lungs demonstrate no evidence of active disease.  Mild atelectasis at the lung bases.     Small retrocardiac infiltrate suspected within the left lower lobe.  No evidence of pleural effusion or pneumothorax    There is a streaky opacity in the lateral aspect of the base of the left lung.  This is characteristic of subsegmental atelectasis.   CXR 5/13 @ 0214      CXR 5/13 @  0321      CXR 5/14    Respiratory Culture Normal respiratory irwin   Respiratory Culture No S aureus or Pseudomonas isolated.   Gram Stain (Respiratory) <10 epithelial cells per low power field.   Gram Stain (Respiratory) Many WBC's   Gram Stain (Respiratory) Rare Gram positive cocci      Respiratory Culture 5/14                                                                             Doctor, please clarify if the diagnosis of pneumonia has been:    Provider Use Only     [   ] Pneumonia ruled in      [ x  ] Pneumonia ruled out, please clarify if another diagnosis is ruled in           [ x  ] Atelectasis only ruled in          [   ] Other, please specify _________     [   ] Other clarification, please specify _______________                                                                                                             [  ] Clinically Undetermined

## 2020-05-20 NOTE — ED NOTES
Unable to perform EKG at this time due to combative patient   Benzoyl Peroxide Pregnancy And Lactation Text: This medication is Pregnancy Category C. It is unknown if benzoyl peroxide is excreted in breast milk.

## 2020-05-21 ENCOUNTER — TELEPHONE (OUTPATIENT)
Dept: GASTROENTEROLOGY | Facility: CLINIC | Age: 60
End: 2020-05-21

## 2020-05-21 NOTE — TELEPHONE ENCOUNTER
----- Message from RAUL Viera sent at 5/21/2020 10:25 AM CDT -----  I am out next week as well.  Just a head's up    ----- Message -----  From: Alma Delia Green MD  Sent: 5/20/2020   5:59 PM CDT  To: RAUL Viera, Lucia Ramirez LPN    Yes, she was admitted again with severe hyperammonemia and encephalopathy but her urine tox was also positive for drugs again and this time a different drug.  Given her issue with polysubstance abuse I do not think she will be a transplant candidate.  I can certainly follow up with her after you see her me a to discuss all of this in more detail.  ----- Message -----  From: Lucia Ramirez LPN  Sent: 5/18/2020  11:00 AM CDT  To: Alma Delia Green MD    Since you will be out of the office next week, is this patient ok to see RAUL Silver?    ----- Message -----  From: Debbie Leong RN  Sent: 5/17/2020   2:32 PM CDT  To: Peter DAVIS Staff    Pt being discharged and needs a follow up appt in 1 week. Please call pt with yaz up appt.

## 2020-05-21 NOTE — TELEPHONE ENCOUNTER
Attempted to contact patient at [phone] with no answer. Left voicemail message for patient to return call.

## 2020-05-27 ENCOUNTER — TELEPHONE (OUTPATIENT)
Dept: HEMATOLOGY/ONCOLOGY | Facility: CLINIC | Age: 60
End: 2020-05-27

## 2020-05-27 NOTE — TELEPHONE ENCOUNTER
----- Message from Hardy Hoskins MD sent at 5/27/2020  1:12 PM CDT -----  Any updates on this patient?

## 2020-05-29 ENCOUNTER — HOSPITAL ENCOUNTER (OUTPATIENT)
Dept: RADIOLOGY | Facility: HOSPITAL | Age: 60
Discharge: HOME | End: 2020-05-29
Attending: INTERNAL MEDICINE
Payer: MEDICAID

## 2020-05-29 VITALS
SYSTOLIC BLOOD PRESSURE: 118 MMHG | WEIGHT: 133 LBS | BODY MASS INDEX: 24.48 KG/M2 | RESPIRATION RATE: 16 BRPM | HEIGHT: 62 IN | HEART RATE: 78 BPM | DIASTOLIC BLOOD PRESSURE: 67 MMHG | OXYGEN SATURATION: 98 %

## 2020-05-29 DIAGNOSIS — R18.8 OTHER ASCITES: ICD-10-CM

## 2020-05-29 LAB
APPEARANCE FLD: CLEAR
BODY FLD TYPE: NORMAL
COLOR FLD: YELLOW
LYMPHOCYTES NFR FLD MANUAL: 12 %
MONOS+MACROS NFR FLD MANUAL: 86 %
NEUTROPHILS NFR FLD MANUAL: 2 %
PATH INTERP FLD-IMP: NORMAL
WBC # FLD: 71 /CU MM

## 2020-05-29 PROCEDURE — 89051 BODY FLUID CELL COUNT: CPT

## 2020-05-29 PROCEDURE — 49083 ABD PARACENTESIS W/IMAGING: CPT

## 2020-05-29 PROCEDURE — 87075 CULTR BACTERIA EXCEPT BLOOD: CPT

## 2020-05-29 PROCEDURE — 87070 CULTURE OTHR SPECIMN AEROBIC: CPT

## 2020-05-29 PROCEDURE — 87205 SMEAR GRAM STAIN: CPT

## 2020-05-29 NOTE — PLAN OF CARE
Discharge papers given to and reviewed with pt and pt verbalized understanding of all. Pt discharged home, taken out via wheelchair and driven home by boyfriend.

## 2020-05-29 NOTE — DISCHARGE INSTRUCTIONS

## 2020-05-29 NOTE — DISCHARGE SUMMARY
Pre Op Diagnosis: ascites     Post Op Diagnosis: same     Procedure:  para     Procedure performed by: Ameya MONTENEGRO, Herbert ONTIVEROS     Written Informed Consent Obtained: Yes     Specimen Removed:  yes     Estimated Blood Loss:  minimal     Findings: Local anesthesia.     The patient tolerated the procedure well and there were no complications.      Sterile technique was performed in the RLQ, lidocaine was used as a local anesthetic.   3.5 liters of colette fluid removed for therapeutic purposes.  Pt tolerated the procedure well without immediate complications.  Please see radiologist report for details. F/u with PCP and/or ordering physician.

## 2020-05-29 NOTE — PLAN OF CARE
3.5 liters of light colette colored fluid removed during paracentesis. VSS, no acute distress noted, pt tolerated well. Band aid placed to right flank puncture site C/D/I with no bleeding noted to site.

## 2020-06-01 ENCOUNTER — HOSPITAL ENCOUNTER (OUTPATIENT)
Facility: HOSPITAL | Age: 60
Discharge: HOME | End: 2020-06-02
Attending: EMERGENCY MEDICINE | Admitting: INTERNAL MEDICINE
Payer: MEDICAID

## 2020-06-01 DIAGNOSIS — K76.82 HEPATIC ENCEPHALOPATHY: Primary | ICD-10-CM

## 2020-06-01 LAB
ALBUMIN SERPL BCP-MCNC: 2.3 G/DL (ref 3.5–5.2)
ALP SERPL-CCNC: 161 U/L (ref 55–135)
ALT SERPL W/O P-5'-P-CCNC: 25 U/L (ref 10–44)
AMMONIA PLAS-SCNC: 60 UMOL/L (ref 10–50)
AMPHET+METHAMPHET UR QL: NEGATIVE
ANION GAP SERPL CALC-SCNC: 12 MMOL/L (ref 8–16)
AST SERPL-CCNC: 43 U/L (ref 10–40)
BARBITURATES UR QL SCN>200 NG/ML: NEGATIVE
BASOPHILS # BLD AUTO: 0.02 K/UL (ref 0–0.2)
BASOPHILS NFR BLD: 0.5 % (ref 0–1.9)
BENZODIAZ UR QL SCN>200 NG/ML: NEGATIVE
BILIRUB SERPL-MCNC: 1.4 MG/DL (ref 0.1–1)
BILIRUB UR QL STRIP: NEGATIVE
BUN SERPL-MCNC: 24 MG/DL (ref 8–23)
BZE UR QL SCN: NORMAL
CALCIUM SERPL-MCNC: 8.1 MG/DL (ref 8.7–10.5)
CANNABINOIDS UR QL SCN: NEGATIVE
CHLORIDE SERPL-SCNC: 104 MMOL/L (ref 95–110)
CLARITY UR: CLEAR
CO2 SERPL-SCNC: 24 MMOL/L (ref 23–29)
COLOR UR: YELLOW
CREAT SERPL-MCNC: 2 MG/DL (ref 0.5–1.4)
CREAT UR-MCNC: 74.3 MG/DL (ref 15–325)
DIFFERENTIAL METHOD: ABNORMAL
EOSINOPHIL # BLD AUTO: 0 K/UL (ref 0–0.5)
EOSINOPHIL NFR BLD: 0.2 % (ref 0–8)
ERYTHROCYTE [DISTWIDTH] IN BLOOD BY AUTOMATED COUNT: 16.3 % (ref 11.5–14.5)
EST. GFR  (AFRICAN AMERICAN): 30 ML/MIN/1.73 M^2
EST. GFR  (NON AFRICAN AMERICAN): 26 ML/MIN/1.73 M^2
GLUCOSE SERPL-MCNC: 112 MG/DL (ref 70–110)
GLUCOSE UR QL STRIP: NEGATIVE
HCT VFR BLD AUTO: 32.8 % (ref 37–48.5)
HGB BLD-MCNC: 11.1 G/DL (ref 12–16)
HGB UR QL STRIP: ABNORMAL
HIV 1+2 AB+HIV1 P24 AG SERPL QL IA: NEGATIVE
IMM GRANULOCYTES # BLD AUTO: 0.04 K/UL (ref 0–0.04)
IMM GRANULOCYTES NFR BLD AUTO: 1 % (ref 0–0.5)
INR PPP: 1.3 (ref 0.8–1.2)
KETONES UR QL STRIP: NEGATIVE
LEUKOCYTE ESTERASE UR QL STRIP: NEGATIVE
LYMPHOCYTES # BLD AUTO: 0.5 K/UL (ref 1–4.8)
LYMPHOCYTES NFR BLD: 11.4 % (ref 18–48)
MCH RBC QN AUTO: 33.3 PG (ref 27–31)
MCHC RBC AUTO-ENTMCNC: 33.8 G/DL (ref 32–36)
MCV RBC AUTO: 99 FL (ref 82–98)
METHADONE UR QL SCN>300 NG/ML: NEGATIVE
MONOCYTES # BLD AUTO: 0.5 K/UL (ref 0.3–1)
MONOCYTES NFR BLD: 11.6 % (ref 4–15)
NEUTROPHILS # BLD AUTO: 3.2 K/UL (ref 1.8–7.7)
NEUTROPHILS NFR BLD: 75.3 % (ref 38–73)
NITRITE UR QL STRIP: NEGATIVE
NRBC BLD-RTO: 0 /100 WBC
OPIATES UR QL SCN: NEGATIVE
PCP UR QL SCN>25 NG/ML: NEGATIVE
PH UR STRIP: 6 [PH] (ref 5–8)
PLATELET # BLD AUTO: 119 K/UL (ref 150–350)
PMV BLD AUTO: 11.9 FL (ref 9.2–12.9)
POTASSIUM SERPL-SCNC: 3.8 MMOL/L (ref 3.5–5.1)
PROT SERPL-MCNC: 7.3 G/DL (ref 6–8.4)
PROT UR QL STRIP: NEGATIVE
PROTHROMBIN TIME: 14 SEC (ref 9–12.5)
RBC # BLD AUTO: 3.33 M/UL (ref 4–5.4)
SARS-COV-2 RDRP RESP QL NAA+PROBE: NEGATIVE
SODIUM SERPL-SCNC: 140 MMOL/L (ref 136–145)
SP GR UR STRIP: 1.02 (ref 1–1.03)
TOXICOLOGY INFORMATION: NORMAL
URN SPEC COLLECT METH UR: ABNORMAL
UROBILINOGEN UR STRIP-ACNC: NEGATIVE EU/DL
WBC # BLD AUTO: 4.21 K/UL (ref 3.9–12.7)

## 2020-06-01 PROCEDURE — 63600175 PHARM REV CODE 636 W HCPCS: Performed by: NURSE PRACTITIONER

## 2020-06-01 PROCEDURE — G0378 HOSPITAL OBSERVATION PER HR: HCPCS

## 2020-06-01 PROCEDURE — 85025 COMPLETE CBC W/AUTO DIFF WBC: CPT

## 2020-06-01 PROCEDURE — 96374 THER/PROPH/DIAG INJ IV PUSH: CPT

## 2020-06-01 PROCEDURE — 85610 PROTHROMBIN TIME: CPT

## 2020-06-01 PROCEDURE — 82140 ASSAY OF AMMONIA: CPT

## 2020-06-01 PROCEDURE — U0002 COVID-19 LAB TEST NON-CDC: HCPCS

## 2020-06-01 PROCEDURE — 25000003 PHARM REV CODE 250: Performed by: EMERGENCY MEDICINE

## 2020-06-01 PROCEDURE — 25000003 PHARM REV CODE 250: Performed by: INTERNAL MEDICINE

## 2020-06-01 PROCEDURE — 36415 COLL VENOUS BLD VENIPUNCTURE: CPT

## 2020-06-01 PROCEDURE — 96361 HYDRATE IV INFUSION ADD-ON: CPT

## 2020-06-01 PROCEDURE — 80053 COMPREHEN METABOLIC PANEL: CPT

## 2020-06-01 PROCEDURE — 81003 URINALYSIS AUTO W/O SCOPE: CPT | Mod: 59

## 2020-06-01 PROCEDURE — 99285 EMERGENCY DEPT VISIT HI MDM: CPT | Mod: 25

## 2020-06-01 PROCEDURE — 86703 HIV-1/HIV-2 1 RESULT ANTBDY: CPT

## 2020-06-01 PROCEDURE — 25000003 PHARM REV CODE 250: Performed by: NURSE PRACTITIONER

## 2020-06-01 PROCEDURE — 80307 DRUG TEST PRSMV CHEM ANLYZR: CPT

## 2020-06-01 RX ORDER — SPIRONOLACTONE 25 MG/1
100 TABLET ORAL 2 TIMES DAILY
Status: DISCONTINUED | OUTPATIENT
Start: 2020-06-01 | End: 2020-06-02 | Stop reason: HOSPADM

## 2020-06-01 RX ORDER — METOPROLOL TARTRATE 25 MG/1
25 TABLET, FILM COATED ORAL 2 TIMES DAILY
Status: DISCONTINUED | OUTPATIENT
Start: 2020-06-01 | End: 2020-06-02 | Stop reason: HOSPADM

## 2020-06-01 RX ORDER — AMITRIPTYLINE HYDROCHLORIDE 25 MG/1
25 TABLET, FILM COATED ORAL NIGHTLY
Status: DISCONTINUED | OUTPATIENT
Start: 2020-06-01 | End: 2020-06-02 | Stop reason: HOSPADM

## 2020-06-01 RX ORDER — LACTULOSE 10 G/15ML
20 SOLUTION ORAL
Status: COMPLETED | OUTPATIENT
Start: 2020-06-01 | End: 2020-06-01

## 2020-06-01 RX ORDER — PANTOPRAZOLE SODIUM 40 MG/1
40 TABLET, DELAYED RELEASE ORAL 2 TIMES DAILY
Status: DISCONTINUED | OUTPATIENT
Start: 2020-06-01 | End: 2020-06-02 | Stop reason: HOSPADM

## 2020-06-01 RX ORDER — ONDANSETRON 2 MG/ML
4 INJECTION INTRAMUSCULAR; INTRAVENOUS EVERY 8 HOURS PRN
Status: DISCONTINUED | OUTPATIENT
Start: 2020-06-01 | End: 2020-06-02 | Stop reason: HOSPADM

## 2020-06-01 RX ORDER — FUROSEMIDE 40 MG/1
40 TABLET ORAL 2 TIMES DAILY
Status: DISCONTINUED | OUTPATIENT
Start: 2020-06-01 | End: 2020-06-02 | Stop reason: HOSPADM

## 2020-06-01 RX ORDER — LACTULOSE 10 G/15ML
10 SOLUTION ORAL 3 TIMES DAILY
Status: DISCONTINUED | OUTPATIENT
Start: 2020-06-01 | End: 2020-06-02 | Stop reason: HOSPADM

## 2020-06-01 RX ADMIN — PANTOPRAZOLE SODIUM 40 MG: 40 TABLET, DELAYED RELEASE ORAL at 09:06

## 2020-06-01 RX ADMIN — LACTULOSE 10 G: 20 SOLUTION ORAL at 09:06

## 2020-06-01 RX ADMIN — LACTULOSE 20 G: 20 SOLUTION ORAL at 03:06

## 2020-06-01 RX ADMIN — ONDANSETRON 4 MG: 2 INJECTION INTRAMUSCULAR; INTRAVENOUS at 04:06

## 2020-06-01 RX ADMIN — RIFAXIMIN 550 MG: 550 TABLET ORAL at 10:06

## 2020-06-01 RX ADMIN — SODIUM CHLORIDE 1000 ML: 0.9 INJECTION, SOLUTION INTRAVENOUS at 03:06

## 2020-06-01 NOTE — ED PROVIDER NOTES
SCRIBE #1 NOTE: I, Mario Kennedy, am scribing for, and in the presence of, Tahir Short MD. I have scribed the HPI, ROS, and PEx.     SCRIBE #2 NOTE: I, Brant Messer, am scribing for, and in the presence of,  Maddi Young MD. I have scribed the remaining portions of the note not scribed by Scribe #1.      History     Chief Complaint   Patient presents with    Altered Mental Status     reports pt lethargic/ pmhx liver cirrhosis and noncompliant     Review of patient's allergies indicates:  No Known Allergies      History of Present Illness     HPI    6/1/2020, 12:57 PM  History obtained from EMS  HPI limited due to AMS of patient      History of Present Illness: Jossy Siegel is a 59 y.o. female patient with a PMHx of alcoholic cirrhosis of liver with ascites, hepatitis, and pancytopenia who presents to the Emergency Department for evaluation of altered mental status which onset gradually PTA. Pt is non-compliant with her cirrhosis medication. Symptoms are constant and moderate in severity. No mitigating or exacerbating factors reported. Associated sxs include lethargy and abdominal distention. No prior Tx reported. No further complaints or concerns at this time.       Arrival mode: EMS    PCP: Primary Doctor No        Past Medical History:  Past Medical History:   Diagnosis Date    Alcoholic cirrhosis of liver with ascites     Cirrhosis     Hepatitis     Pancytopenia 2/5/2020    Last Assessment & Plan:  History & Physical Chronic.  Stable.  Follow-up repeat CBC and INR in a.m.  Discharge Summary  Chronic.  Stable. Likely secondary to cirrhosis. Follow-up  Chronic.  Stable. Likely secondary to cirrhosis.       Past Surgical History:  Past Surgical History:   Procedure Laterality Date    PARACENTESIS           Family History:  History reviewed. No pertinent family history.      Social History:  Social History     Tobacco Use    Smoking status: Current Every Day Smoker     Packs/day:  0.25     Years: 45.00     Pack years: 11.25     Types: Cigarettes    Smokeless tobacco: Never Used   Substance and Sexual Activity    Alcohol use: Not Currently    Drug use: Yes     Types: Cocaine    Sexual activity: Unknown        Review of Systems     Review of Systems   Unable to perform ROS: Mental status change   Constitutional: Positive for activity change (Lethargy).   Gastrointestinal: Positive for abdominal distention.      Physical Exam     Initial Vitals [06/01/20 1255]   BP Pulse Resp Temp SpO2   116/78 82 16 98.3 °F (36.8 °C) 97 %      MAP       --          Physical Exam  Nursing Notes and Vital Signs Reviewed.  Constitutional: Patient is in no acute distress. Well-developed and well-nourished.  Head: Atraumatic. Normocephalic.  Eyes: PERRL. EOM intact. Conjunctivae are not pale. No scleral icterus.  ENT: Mucous membranes are moist. Oropharynx is clear and symmetric.    Neck: Supple. Full ROM.  Cardiovascular: Regular rate. Regular rhythm. No murmurs, rubs, or gallops. Distal pulses are 2+ and symmetric.  Pulmonary/Chest: No respiratory distress. Clear to auscultation bilaterally. No wheezing or rales.  Abdominal: Abdomen is distended. There is no tenderness to palpation. No rebound or guarding.  Genitourinary: No CVA tenderness  Musculoskeletal: Moves all extremities. No obvious deformities. No edema. No calf tenderness.  Skin: Warm and dry.  Neurological:  Alert and awake. No acute focal neurological deficits are appreciated.  Psychiatric: Good eye contact.       ED Course   Procedures  ED Vital Signs:  Vitals:    06/01/20 1354 06/01/20 1402 06/01/20 1408 06/01/20 1431   BP: 107/62 (!) 116/58  (!) 115/59   Pulse: 96  84 94   Resp: 17  20 15   Temp:       TempSrc:       SpO2: 97%  96% 97%   Weight:       Height:        06/01/20 1631 06/01/20 1632 06/01/20 1634 06/01/20 1641   BP: (!) 94/55      Pulse: 89  89    Resp: 16  18    Temp:    98.6 °F (37 °C)   TempSrc:    Oral   SpO2: 97%  97%    Weight:   60 kg (132 lb 4.8 oz)     Height:        06/01/20 1720 06/01/20 2000 06/01/20 2003 06/02/20 0043   BP: (!) 114/54 (!) 92/48 (!) 92/53 (!) 109/55   Pulse: 93 79 84 97   Resp: 20 18 18   Temp: 98.4 °F (36.9 °C) 98.8 °F (37.1 °C)  98.7 °F (37.1 °C)   TempSrc: Oral Oral  Oral   SpO2: 100% 98%  95%   Weight:       Height:        06/02/20 0420 06/02/20 0825 06/02/20 1237   BP: (!) 109/55 (!) 110/57 109/61   Pulse: 87 90 68   Resp: 18 18 18   Temp: 98.8 °F (37.1 °C) 97.8 °F (36.6 °C) 98.8 °F (37.1 °C)   TempSrc: Oral  Oral   SpO2: 95% 96% 99%   Weight:      Height:          Abnormal Lab Results:  Labs Reviewed   CBC W/ AUTO DIFFERENTIAL - Abnormal; Notable for the following components:       Result Value    RBC 3.33 (*)     Hemoglobin 11.1 (*)     Hematocrit 32.8 (*)     Mean Corpuscular Volume 99 (*)     Mean Corpuscular Hemoglobin 33.3 (*)     RDW 16.3 (*)     Platelets 119 (*)     Immature Granulocytes 1.0 (*)     Lymph # 0.5 (*)     Gran% 75.3 (*)     Lymph% 11.4 (*)     All other components within normal limits   COMPREHENSIVE METABOLIC PANEL - Abnormal; Notable for the following components:    Glucose 112 (*)     BUN, Bld 24 (*)     Creatinine 2.0 (*)     Calcium 8.1 (*)     Albumin 2.3 (*)     Total Bilirubin 1.4 (*)     Alkaline Phosphatase 161 (*)     AST 43 (*)     eGFR if  30 (*)     eGFR if non  26 (*)     All other components within normal limits   URINALYSIS, REFLEX TO URINE CULTURE - Abnormal; Notable for the following components:    Occult Blood UA Trace (*)     All other components within normal limits    Narrative:     Preferred Collection Type->Urine, Clean Catch   AMMONIA - Abnormal; Notable for the following components:    Ammonia 60 (*)     All other components within normal limits   PROTIME-INR - Abnormal; Notable for the following components:    Prothrombin Time 14.0 (*)     INR 1.3 (*)     All other components within normal limits   SARS-COV-2 RNA AMPLIFICATION,  QUAL   HIV 1 / 2 ANTIBODY   PROTIME-INR   DRUG SCREEN PANEL, URINE EMERGENCY   DRUG SCREEN PANEL, URINE EMERGENCY    Narrative:     Preferred Collection Type->Urine, Clean Catch        All Lab Results:  Results for orders placed or performed during the hospital encounter of 06/01/20   CBC auto differential   Result Value Ref Range    WBC 4.21 3.90 - 12.70 K/uL    RBC 3.33 (L) 4.00 - 5.40 M/uL    Hemoglobin 11.1 (L) 12.0 - 16.0 g/dL    Hematocrit 32.8 (L) 37.0 - 48.5 %    Mean Corpuscular Volume 99 (H) 82 - 98 fL    Mean Corpuscular Hemoglobin 33.3 (H) 27.0 - 31.0 pg    Mean Corpuscular Hemoglobin Conc 33.8 32.0 - 36.0 g/dL    RDW 16.3 (H) 11.5 - 14.5 %    Platelets 119 (L) 150 - 350 K/uL    MPV 11.9 9.2 - 12.9 fL    Immature Granulocytes 1.0 (H) 0.0 - 0.5 %    Gran # (ANC) 3.2 1.8 - 7.7 K/uL    Immature Grans (Abs) 0.04 0.00 - 0.04 K/uL    Lymph # 0.5 (L) 1.0 - 4.8 K/uL    Mono # 0.5 0.3 - 1.0 K/uL    Eos # 0.0 0.0 - 0.5 K/uL    Baso # 0.02 0.00 - 0.20 K/uL    nRBC 0 0 /100 WBC    Gran% 75.3 (H) 38.0 - 73.0 %    Lymph% 11.4 (L) 18.0 - 48.0 %    Mono% 11.6 4.0 - 15.0 %    Eosinophil% 0.2 0.0 - 8.0 %    Basophil% 0.5 0.0 - 1.9 %    Differential Method Automated    Comprehensive metabolic panel   Result Value Ref Range    Sodium 140 136 - 145 mmol/L    Potassium 3.8 3.5 - 5.1 mmol/L    Chloride 104 95 - 110 mmol/L    CO2 24 23 - 29 mmol/L    Glucose 112 (H) 70 - 110 mg/dL    BUN, Bld 24 (H) 8 - 23 mg/dL    Creatinine 2.0 (H) 0.5 - 1.4 mg/dL    Calcium 8.1 (L) 8.7 - 10.5 mg/dL    Total Protein 7.3 6.0 - 8.4 g/dL    Albumin 2.3 (L) 3.5 - 5.2 g/dL    Total Bilirubin 1.4 (H) 0.1 - 1.0 mg/dL    Alkaline Phosphatase 161 (H) 55 - 135 U/L    AST 43 (H) 10 - 40 U/L    ALT 25 10 - 44 U/L    Anion Gap 12 8 - 16 mmol/L    eGFR if African American 30 (A) >60 mL/min/1.73 m^2    eGFR if non African American 26 (A) >60 mL/min/1.73 m^2   Urinalysis, Reflex to Urine Culture Urine, Clean Catch   Result Value Ref Range    Specimen UA  Urine, Catheterized     Color, UA Yellow Yellow, Straw, Laure    Appearance, UA Clear Clear    pH, UA 6.0 5.0 - 8.0    Specific Gravity, UA 1.020 1.005 - 1.030    Protein, UA Negative Negative    Glucose, UA Negative Negative    Ketones, UA Negative Negative    Bilirubin (UA) Negative Negative    Occult Blood UA Trace (A) Negative    Nitrite, UA Negative Negative    Urobilinogen, UA Negative <2.0 EU/dL    Leukocytes, UA Negative Negative   Ammonia   Result Value Ref Range    Ammonia 60 (H) 10 - 50 umol/L   COVID-19 Rapid Screening   Result Value Ref Range    SARS-CoV-2 RNA, Amplification, Qual Negative Negative   HIV 1/2 Ag/Ab (4th Gen)   Result Value Ref Range    HIV 1/2 Ag/Ab Negative Negative   Protime-INR   Result Value Ref Range    Prothrombin Time 14.0 (H) 9.0 - 12.5 sec    INR 1.3 (H) 0.8 - 1.2   Drug screen panel, emergency   Result Value Ref Range    Benzodiazepines Negative     Methadone metabolites Negative     Cocaine (Metab.) Presumptive Positive     Opiate Scrn, Ur Negative     Barbiturate Screen, Ur Negative     Amphetamine Screen, Ur Negative     THC Negative     Phencyclidine Negative     Creatinine, Random Ur 74.3 15.0 - 325.0 mg/dL    Toxicology Information SEE COMMENT    Protime-INR   Result Value Ref Range    Prothrombin Time 14.0 (H) 9.0 - 12.5 sec    INR 1.3 (H) 0.8 - 1.2   CBC auto differential   Result Value Ref Range    WBC 3.74 (L) 3.90 - 12.70 K/uL    RBC 2.92 (L) 4.00 - 5.40 M/uL    Hemoglobin 9.6 (L) 12.0 - 16.0 g/dL    Hematocrit 29.6 (L) 37.0 - 48.5 %    Mean Corpuscular Volume 101 (H) 82 - 98 fL    Mean Corpuscular Hemoglobin 32.9 (H) 27.0 - 31.0 pg    Mean Corpuscular Hemoglobin Conc 32.4 32.0 - 36.0 g/dL    RDW 17.1 (H) 11.5 - 14.5 %    Platelets 104 (L) 150 - 350 K/uL    MPV 12.3 9.2 - 12.9 fL    Immature Granulocytes 0.5 0.0 - 0.5 %    Gran # (ANC) 2.1 1.8 - 7.7 K/uL    Immature Grans (Abs) 0.02 0.00 - 0.04 K/uL    Lymph # 0.8 (L) 1.0 - 4.8 K/uL    Mono # 0.6 0.3 - 1.0 K/uL     Eos # 0.1 0.0 - 0.5 K/uL    Baso # 0.03 0.00 - 0.20 K/uL    nRBC 0 0 /100 WBC    Gran% 56.7 38.0 - 73.0 %    Lymph% 22.5 18.0 - 48.0 %    Mono% 16.6 (H) 4.0 - 15.0 %    Eosinophil% 2.9 0.0 - 8.0 %    Basophil% 0.8 0.0 - 1.9 %    Differential Method Automated    Comprehensive metabolic panel   Result Value Ref Range    Sodium 139 136 - 145 mmol/L    Potassium 3.5 3.5 - 5.1 mmol/L    Chloride 106 95 - 110 mmol/L    CO2 25 23 - 29 mmol/L    Glucose 84 70 - 110 mg/dL    BUN, Bld 24 (H) 8 - 23 mg/dL    Creatinine 1.6 (H) 0.5 - 1.4 mg/dL    Calcium 7.8 (L) 8.7 - 10.5 mg/dL    Total Protein 6.4 6.0 - 8.4 g/dL    Albumin 2.1 (L) 3.5 - 5.2 g/dL    Total Bilirubin 1.0 0.1 - 1.0 mg/dL    Alkaline Phosphatase 140 (H) 55 - 135 U/L    AST 40 10 - 40 U/L    ALT 22 10 - 44 U/L    Anion Gap 8 8 - 16 mmol/L    eGFR if African American 40 (A) >60 mL/min/1.73 m^2    eGFR if non African American 35 (A) >60 mL/min/1.73 m^2       Imaging Results:  Imaging Results          X-Ray Chest AP Portable (Final result)  Result time 06/01/20 15:10:26    Final result by Erick Hou MD (06/01/20 15:10:26)                 Impression:      No acute process seen.      Electronically signed by: Erick Hou MD  Date:    06/01/2020  Time:    15:10             Narrative:    EXAMINATION:  XR CHEST AP PORTABLE    CLINICAL HISTORY:  weakness;    FINDINGS:  Single view of the chest.  Comparison 05/16/2020    Cardiac silhouette is normal.  Aorta demonstrates atherosclerotic disease. The lungs demonstrate no evidence of active disease.  No evidence of pleural effusion or pneumothorax.  Bones demonstrate moderate degenerative changes.                               CT Head Without Contrast (Final result)  Result time 06/01/20 15:07:51    Final result by Erick Hou MD (06/01/20 15:07:51)                 Impression:      No acute abnormality.    All CT scans at this facility use dose modulation, iterative reconstruction, and/or weight based dosing when  appropriate to reduce radiation dose to as low as reasonable achievable.      Electronically signed by: Erikc Hou MD  Date:    06/01/2020  Time:    15:07             Narrative:    EXAMINATION:  CT HEAD WITHOUT CONTRAST    CLINICAL HISTORY:  Confusion/delirium, altered LOC, unexplained;    TECHNIQUE:  Low dose axial CT images obtained throughout the head without intravenous contrast. Sagittal and coronal reconstructions were performed.    All CT scans at this facility use dose modulation, iterative reconstruction, and/or weight based dosing when appropriate to reduce radiation dose to as low as reasonable achievable.    COMPARISON:  None.    FINDINGS:  Intracranial compartment:    Mild periventricular white matter disease.  The brain parenchyma appears otherwise normal.  No parenchymal mass, hemorrhage, edema or major vascular distribution infarct.  Benign stable basal ganglia calcifications.    Ventricles and sulci are normal in size for age without evidence of hydrocephalus with mild age-related involutional change.    No extra-axial blood or fluid collections.    Skull/extracranial contents (limited evaluation): No fracture.  Trace right mastoid effusion.  Left mastoid air cells and paranasal sinuses are essentially clear.                                      The Emergency Provider reviewed the vital signs and test results, which are outlined above.     ED Discussion     3:00 PM: Dr. Short transfers care of pt to Dr. Young pending lab and radiology results.    3:43 PM: Discussed case with Linsey Abdi Np ([facility]). Dr. Colvin agrees with current care and management of pt and accepts admission.   Admitting Service: [facility]  Admitting Physician: Dr. Colvin  Admit to: [facility] Standard Bed    3:44 PM: Re-evaluated pt. I have discussed test results, shared treatment plan, and the need for admission with patient. Pt expresses understanding at this time and agree with all information. All  questions answered. Pt has no further questions or concerns at this time. Pt is ready for admit.       Medical Decision Making:   Clinical Tests:   Lab Tests: Ordered and Reviewed  Radiological Study: Ordered and Reviewed           ED Medication(s):  Medications   lactulose 20 gram/30 mL solution Soln 20 g (20 g Oral Given 6/1/20 1522)   sodium chloride 0.9% bolus 1,000 mL (0 mLs Intravenous Stopped 6/1/20 1624)       Discharge Medication List as of 6/2/2020  3:57 PM          Follow-up Information     [facility] In 3 days.    Specialty:  General Practice  Contact information:  [id number] [address]  [facility] [address]  [phone]             Chidi Stuart PA-C In 2 weeks.    Specialty:  Gastroenterology  Contact information:  [address]  [place] [id number] [phone]                       Scribe Attestation:   Scribe #1: I performed the above scribed service and the documentation accurately describes the services I performed. I attest to the accuracy of the note.     Attending:   Physician Attestation Statement for Scribe #1: I, Tahir Short MD, personally performed the services described in this documentation, as scribed by Mario Kennedy, in my presence, and it is both accurate and complete.       Scribe Attestation:   Scribe #2: I performed the above scribed service and the documentation accurately describes the services I performed. I attest to the accuracy of the note.    Attending Attestation:           Physician Attestation for Scribe:    Physician Attestation Statement for Scribe #2: I, Maddi Young MD, reviewed documentation, as scribed by Brant Messer in my presence, and it is both accurate and complete. I also acknowledge and confirm the content of the note done by Dariaibe #1.           Clinical Impression       ICD-10-CM ICD-9-CM   1. Hepatic encephalopathy K72.90 572.2       Disposition:   Disposition: Placed in Observation  Condition: Fair       Maddi Young  MD  06/05/20 1519       Maddi Young MD  06/05/20 1511

## 2020-06-01 NOTE — H&P
[facility] Hospital [facility]  History & Physical    Patient Name: Jossy Siegel  MRN: [id number]  Admission Date: 6/1/2020  Attending Physician: Maddi Young MD   Primary Care Provider: Primary Doctor No      Patient information was obtained from ER records.     Subjective:     Principal Problem:Hepatic encephalopathy    Chief Complaint:   Chief Complaint   Patient presents with    Altered Mental Status     reports pt lethargic/ pmhx liver cirrhosis and noncompliant        HPI: Jossy Siegel is a 59 year old female with history of pancytopenia, hepatitis and alcoholic cirrhosis who presents to ED for further evaluation of altered mental status. Patient is not compliant with her lactulose at home. She was lethargic upon arrival, but is not awake. She is a poor historian.     In ED, ammonia level is 60. UDS is pending. CT head and CXR are unremarkable. Hospital medicine has been consulted for admission.       Past Medical History:   Diagnosis Date    Alcoholic cirrhosis of liver with ascites     Cirrhosis     Hepatitis     Pancytopenia 2/5/2020    Last Assessment & Plan:  History & Physical Chronic.  Stable.  Follow-up repeat CBC and INR in a.m.  Discharge Summary  Chronic.  Stable. Likely secondary to cirrhosis. Follow-up  Chronic.  Stable. Likely secondary to cirrhosis.       Past Surgical History:   Procedure Laterality Date    PARACENTESIS         Review of patient's allergies indicates:  No Known Allergies    No current facility-administered medications on file prior to encounter.      Current Outpatient Medications on File Prior to Encounter   Medication Sig    albuterol (PROVENTIL/VENTOLIN HFA) 90 mcg/actuation inhaler Inhale 2 puffs into the lungs every 6 (six) hours.    amitriptyline (ELAVIL) 25 MG tablet Take 25 mg by mouth every evening.    ciprofloxacin HCl (CIPRO) 500 MG tablet Take 1 tablet (500 mg total) by mouth once a week.    cyclobenzaprine (FLEXERIL) 10  MG tablet TAKE 1 TABLET BY MOUTH 3 (THREE) TIMES DAILY AS NEEDED FOR MUSCLE SPASMS (PAIN) FOR UP TO 10 DAYS.    furosemide (LASIX) 40 MG tablet Take 1 tablet (40 mg total) by mouth 2 (two) times daily.    lactulose 10 gram/15 ml (CHRONULAC) 10 gram/15 mL (15 mL) solution Take 15 mLs (10 g total) by mouth 3 (three) times daily.    metoprolol tartrate (LOPRESSOR) 25 MG tablet Take 1 tablet (25 mg total) by mouth 2 (two) times daily.    ondansetron (ZOFRAN) 4 MG tablet TAKE 1 TABLET BY MOUTH EVERY 8 (EIGHT) HOURS AS NEEDED FOR NAUSEA FORUP TO 7 DAYS.    ondansetron (ZOFRAN-ODT) 4 MG TbDL Take 1 tablet (4 mg total) by mouth every 8 (eight) hours as needed (nausea).    pantoprazole (PROTONIX) 40 MG tablet Take 1 tablet (40 mg total) by mouth 2 (two) times daily.    rifAXImin (XIFAXAN) 200 mg Tab TAKE 1 TABLET BY MOUTH 2 (TWO) TIMES DAILY BEFORE MEALS FOR 14 DAYS.    spironolactone (ALDACTONE) 100 MG tablet Take 1 tablet (100 mg total) by mouth 2 (two) times daily.     Family History     None        Tobacco Use    Smoking status: Current Every Day Smoker     Packs/day: 0.25     Years: 45.00     Pack years: 11.25     Types: Cigarettes    Smokeless tobacco: Never Used   Substance and Sexual Activity    Alcohol use: Not Currently    Drug use: Yes     Types: Cocaine    Sexual activity: Not on file     Review of Systems   Unable to perform ROS: Mental status change     Objective:     Vital Signs (Most Recent):  Temp: 98.3 °F (36.8 °C) (06/01/20 1255)  Pulse: 94 (06/01/20 1431)  Resp: 15 (06/01/20 1431)  BP: (!) 115/59 (06/01/20 1431)  SpO2: 97 % (06/01/20 1431) Vital Signs (24h Range):  Temp:  [98.3 °F (36.8 °C)] 98.3 °F (36.8 °C)  Pulse:  [82-96] 94  Resp:  [15-20] 15  SpO2:  [96 %-97 %] 97 %  BP: (107-116)/(58-78) 115/59        Body mass index is 24.33 kg/m².    Physical Exam   Constitutional: She appears well-developed and well-nourished.   HENT:   Head: Normocephalic and atraumatic.   Eyes: EOM are normal.    Neck: Normal range of motion. Neck supple.   Cardiovascular: Normal rate, regular rhythm and normal heart sounds.   No murmur heard.  Pulmonary/Chest: Effort normal and breath sounds normal. No respiratory distress.   Abdominal: Soft. Bowel sounds are normal. She exhibits no distension. There is no tenderness.   Musculoskeletal: Normal range of motion. She exhibits no edema.   Neurological: She is alert.   Oriented to person only. Can follow some commands.    Skin: Skin is warm and dry.         CRANIAL NERVES     CN III, IV, VI   Extraocular motions are normal.        Significant Labs:   CBC:   Recent Labs   Lab 06/01/20  1356   WBC 4.21   HGB 11.1*   HCT 32.8*   *     CMP:   Recent Labs   Lab 06/01/20  1356      K 3.8      CO2 24   *   BUN 24*   CREATININE 2.0*   CALCIUM 8.1*   PROT 7.3   ALBUMIN 2.3*   BILITOT 1.4*   ALKPHOS 161*   AST 43*   ALT 25   ANIONGAP 12   EGFRNONAA 26*       Significant Imaging:   Imaging Results          X-Ray Chest AP Portable (Final result)  Result time 06/01/20 15:10:26    Final result by Erick Hou MD (06/01/20 15:10:26)                 Impression:      No acute process seen.      Electronically signed by: Erick Hou MD  Date:    06/01/2020  Time:    15:10             Narrative:    EXAMINATION:  XR CHEST AP PORTABLE    CLINICAL HISTORY:  weakness;    FINDINGS:  Single view of the chest.  Comparison 05/16/2020    Cardiac silhouette is normal.  Aorta demonstrates atherosclerotic disease. The lungs demonstrate no evidence of active disease.  No evidence of pleural effusion or pneumothorax.  Bones demonstrate moderate degenerative changes.                               CT Head Without Contrast (Final result)  Result time 06/01/20 15:07:51    Final result by Erick Hou MD (06/01/20 15:07:51)                 Impression:      No acute abnormality.    All CT scans at this facility use dose modulation, iterative reconstruction, and/or weight based  dosing when appropriate to reduce radiation dose to as low as reasonable achievable.      Electronically signed by: Erick Hou MD  Date:    06/01/2020  Time:    15:07             Narrative:    EXAMINATION:  CT HEAD WITHOUT CONTRAST    CLINICAL HISTORY:  Confusion/delirium, altered LOC, unexplained;    TECHNIQUE:  Low dose axial CT images obtained throughout the head without intravenous contrast. Sagittal and coronal reconstructions were performed.    All CT scans at this facility use dose modulation, iterative reconstruction, and/or weight based dosing when appropriate to reduce radiation dose to as low as reasonable achievable.    COMPARISON:  None.    FINDINGS:  Intracranial compartment:    Mild periventricular white matter disease.  The brain parenchyma appears otherwise normal.  No parenchymal mass, hemorrhage, edema or major vascular distribution infarct.  Benign stable basal ganglia calcifications.    Ventricles and sulci are normal in size for age without evidence of hydrocephalus with mild age-related involutional change.    No extra-axial blood or fluid collections.    Skull/extracranial contents (limited evaluation): No fracture.  Trace right mastoid effusion.  Left mastoid air cells and paranasal sinuses are essentially clear.                                  Assessment/Plan:     * Hepatic encephalopathy  --lactulose TID until mentation has improved  --neuro checks  --UDS is pending      Cirrhosis of liver with ascites related to hepatitis C virus  --resume diuretics, rifaximin  --will need outpatient follow up    MELD-Na score: 17 at 6/1/2020  1:56 PM  MELD score: 17 at 6/1/2020  1:56 PM  Calculated from:  Serum Creatinine: 2.0 mg/dL at 6/1/2020  1:56 PM  Serum Sodium: 140 mmol/L (Rounded to 137 mmol/L) at 6/1/2020  1:56 PM  Total Bilirubin: 1.4 mg/dL at 6/1/2020  1:56 PM  INR(ratio): 1.3 at 6/1/2020  1:56 PM  Age: 59 years        VTE Risk Mitigation (From admission, onward)    None        Linsey  ARTURO Abdi NP  Department of Hospital Medicine   [facility]

## 2020-06-01 NOTE — ASSESSMENT & PLAN NOTE
--resume diuretics, rifaximin  --will need outpatient follow up    MELD-Na score: 17 at 6/1/2020  1:56 PM  MELD score: 17 at 6/1/2020  1:56 PM  Calculated from:  Serum Creatinine: 2.0 mg/dL at 6/1/2020  1:56 PM  Serum Sodium: 140 mmol/L (Rounded to 137 mmol/L) at 6/1/2020  1:56 PM  Total Bilirubin: 1.4 mg/dL at 6/1/2020  1:56 PM  INR(ratio): 1.3 at 6/1/2020  1:56 PM  Age: 59 years

## 2020-06-01 NOTE — H&P (VIEW-ONLY)
[facility]  History & Physical    Patient Name: Jossy Siegel  MRN: [id number]  Admission Date: 6/1/2020  Attending Physician: Maddi Young MD   Primary Care Provider: Primary Doctor No      Patient information was obtained from ER records.     Subjective:     Principal Problem:Hepatic encephalopathy    Chief Complaint:   Chief Complaint   Patient presents with    Altered Mental Status     reports pt lethargic/ pmhx liver cirrhosis and noncompliant        HPI: Jossy Siegel is a 59 year old female with history of pancytopenia, hepatitis and alcoholic cirrhosis who presents to ED for further evaluation of altered mental status. Patient is not compliant with her lactulose at home. She was lethargic upon arrival, but is not awake. She is a poor historian.     In ED, ammonia level is 60. UDS is pending. CT head and CXR are unremarkable. Hospital medicine has been consulted for admission.       Past Medical History:   Diagnosis Date    Alcoholic cirrhosis of liver with ascites     Cirrhosis     Hepatitis     Pancytopenia 2/5/2020    Last Assessment & Plan:  History & Physical Chronic.  Stable.  Follow-up repeat CBC and INR in a.m.  Discharge Summary  Chronic.  Stable. Likely secondary to cirrhosis. Follow-up  Chronic.  Stable. Likely secondary to cirrhosis.       Past Surgical History:   Procedure Laterality Date    PARACENTESIS         Review of patient's allergies indicates:  No Known Allergies    No current facility-administered medications on file prior to encounter.      Current Outpatient Medications on File Prior to Encounter   Medication Sig    albuterol (PROVENTIL/VENTOLIN HFA) 90 mcg/actuation inhaler Inhale 2 puffs into the lungs every 6 (six) hours.    amitriptyline (ELAVIL) 25 MG tablet Take 25 mg by mouth every evening.    ciprofloxacin HCl (CIPRO) 500 MG tablet Take 1 tablet (500 mg total) by mouth once a week.    cyclobenzaprine (FLEXERIL) 10  MG tablet TAKE 1 TABLET BY MOUTH 3 (THREE) TIMES DAILY AS NEEDED FOR MUSCLE SPASMS (PAIN) FOR UP TO 10 DAYS.    furosemide (LASIX) 40 MG tablet Take 1 tablet (40 mg total) by mouth 2 (two) times daily.    lactulose 10 gram/15 ml (CHRONULAC) 10 gram/15 mL (15 mL) solution Take 15 mLs (10 g total) by mouth 3 (three) times daily.    metoprolol tartrate (LOPRESSOR) 25 MG tablet Take 1 tablet (25 mg total) by mouth 2 (two) times daily.    ondansetron (ZOFRAN) 4 MG tablet TAKE 1 TABLET BY MOUTH EVERY 8 (EIGHT) HOURS AS NEEDED FOR NAUSEA FORUP TO 7 DAYS.    ondansetron (ZOFRAN-ODT) 4 MG TbDL Take 1 tablet (4 mg total) by mouth every 8 (eight) hours as needed (nausea).    pantoprazole (PROTONIX) 40 MG tablet Take 1 tablet (40 mg total) by mouth 2 (two) times daily.    rifAXImin (XIFAXAN) 200 mg Tab TAKE 1 TABLET BY MOUTH 2 (TWO) TIMES DAILY BEFORE MEALS FOR 14 DAYS.    spironolactone (ALDACTONE) 100 MG tablet Take 1 tablet (100 mg total) by mouth 2 (two) times daily.     Family History     None        Tobacco Use    Smoking status: Current Every Day Smoker     Packs/day: 0.25     Years: 45.00     Pack years: 11.25     Types: Cigarettes    Smokeless tobacco: Never Used   Substance and Sexual Activity    Alcohol use: Not Currently    Drug use: Yes     Types: Cocaine    Sexual activity: Not on file     Review of Systems   Unable to perform ROS: Mental status change     Objective:     Vital Signs (Most Recent):  Temp: 98.3 °F (36.8 °C) (06/01/20 1255)  Pulse: 94 (06/01/20 1431)  Resp: 15 (06/01/20 1431)  BP: (!) 115/59 (06/01/20 1431)  SpO2: 97 % (06/01/20 1431) Vital Signs (24h Range):  Temp:  [98.3 °F (36.8 °C)] 98.3 °F (36.8 °C)  Pulse:  [82-96] 94  Resp:  [15-20] 15  SpO2:  [96 %-97 %] 97 %  BP: (107-116)/(58-78) 115/59        Body mass index is 24.33 kg/m².    Physical Exam   Constitutional: She appears well-developed and well-nourished.   HENT:   Head: Normocephalic and atraumatic.   Eyes: EOM are normal.    Neck: Normal range of motion. Neck supple.   Cardiovascular: Normal rate, regular rhythm and normal heart sounds.   No murmur heard.  Pulmonary/Chest: Effort normal and breath sounds normal. No respiratory distress.   Abdominal: Soft. Bowel sounds are normal. She exhibits no distension. There is no tenderness.   Musculoskeletal: Normal range of motion. She exhibits no edema.   Neurological: She is alert.   Oriented to person only. Can follow some commands.    Skin: Skin is warm and dry.         CRANIAL NERVES     CN III, IV, VI   Extraocular motions are normal.        Significant Labs:   CBC:   Recent Labs   Lab 06/01/20  1356   WBC 4.21   HGB 11.1*   HCT 32.8*   *     CMP:   Recent Labs   Lab 06/01/20  1356      K 3.8      CO2 24   *   BUN 24*   CREATININE 2.0*   CALCIUM 8.1*   PROT 7.3   ALBUMIN 2.3*   BILITOT 1.4*   ALKPHOS 161*   AST 43*   ALT 25   ANIONGAP 12   EGFRNONAA 26*       Significant Imaging:   Imaging Results          X-Ray Chest AP Portable (Final result)  Result time 06/01/20 15:10:26    Final result by Erick Hou MD (06/01/20 15:10:26)                 Impression:      No acute process seen.      Electronically signed by: Erick Hou MD  Date:    06/01/2020  Time:    15:10             Narrative:    EXAMINATION:  XR CHEST AP PORTABLE    CLINICAL HISTORY:  weakness;    FINDINGS:  Single view of the chest.  Comparison 05/16/2020    Cardiac silhouette is normal.  Aorta demonstrates atherosclerotic disease. The lungs demonstrate no evidence of active disease.  No evidence of pleural effusion or pneumothorax.  Bones demonstrate moderate degenerative changes.                               CT Head Without Contrast (Final result)  Result time 06/01/20 15:07:51    Final result by Erick Hou MD (06/01/20 15:07:51)                 Impression:      No acute abnormality.    All CT scans at this facility use dose modulation, iterative reconstruction, and/or weight based  dosing when appropriate to reduce radiation dose to as low as reasonable achievable.      Electronically signed by: Erick Hou MD  Date:    06/01/2020  Time:    15:07             Narrative:    EXAMINATION:  CT HEAD WITHOUT CONTRAST    CLINICAL HISTORY:  Confusion/delirium, altered LOC, unexplained;    TECHNIQUE:  Low dose axial CT images obtained throughout the head without intravenous contrast. Sagittal and coronal reconstructions were performed.    All CT scans at this facility use dose modulation, iterative reconstruction, and/or weight based dosing when appropriate to reduce radiation dose to as low as reasonable achievable.    COMPARISON:  None.    FINDINGS:  Intracranial compartment:    Mild periventricular white matter disease.  The brain parenchyma appears otherwise normal.  No parenchymal mass, hemorrhage, edema or major vascular distribution infarct.  Benign stable basal ganglia calcifications.    Ventricles and sulci are normal in size for age without evidence of hydrocephalus with mild age-related involutional change.    No extra-axial blood or fluid collections.    Skull/extracranial contents (limited evaluation): No fracture.  Trace right mastoid effusion.  Left mastoid air cells and paranasal sinuses are essentially clear.                                  Assessment/Plan:     * Hepatic encephalopathy  --lactulose TID until mentation has improved  --neuro checks  --UDS is pending      Cirrhosis of liver with ascites related to hepatitis C virus  --resume diuretics, rifaximin  --will need outpatient follow up    MELD-Na score: 17 at 6/1/2020  1:56 PM  MELD score: 17 at 6/1/2020  1:56 PM  Calculated from:  Serum Creatinine: 2.0 mg/dL at 6/1/2020  1:56 PM  Serum Sodium: 140 mmol/L (Rounded to 137 mmol/L) at 6/1/2020  1:56 PM  Total Bilirubin: 1.4 mg/dL at 6/1/2020  1:56 PM  INR(ratio): 1.3 at 6/1/2020  1:56 PM  Age: 59 years        VTE Risk Mitigation (From admission, onward)    None        Linsey  ARTURO Abdi NP  Department of Hospital Medicine   [facility]

## 2020-06-01 NOTE — HPI
Jossy Siegel is a 59 year old female with history of pancytopenia, hepatitis and alcoholic cirrhosis who presents to ED for further evaluation of altered mental status. Patient is not compliant with her lactulose at home. She was lethargic upon arrival, but is not awake. She is a poor historian.     In ED, ammonia level is 60. UDS is pending. CT head and CXR are unremarkable. Hospital medicine has been consulted for admission.

## 2020-06-01 NOTE — SUBJECTIVE & OBJECTIVE
Past Medical History:   Diagnosis Date    Alcoholic cirrhosis of liver with ascites     Cirrhosis     Hepatitis     Pancytopenia 2/5/2020    Last Assessment & Plan:  History & Physical Chronic.  Stable.  Follow-up repeat CBC and INR in a.m.  Discharge Summary  Chronic.  Stable. Likely secondary to cirrhosis. Follow-up  Chronic.  Stable. Likely secondary to cirrhosis.       Past Surgical History:   Procedure Laterality Date    PARACENTESIS         Review of patient's allergies indicates:  No Known Allergies    No current facility-administered medications on file prior to encounter.      Current Outpatient Medications on File Prior to Encounter   Medication Sig    albuterol (PROVENTIL/VENTOLIN HFA) 90 mcg/actuation inhaler Inhale 2 puffs into the lungs every 6 (six) hours.    amitriptyline (ELAVIL) 25 MG tablet Take 25 mg by mouth every evening.    ciprofloxacin HCl (CIPRO) 500 MG tablet Take 1 tablet (500 mg total) by mouth once a week.    cyclobenzaprine (FLEXERIL) 10 MG tablet TAKE 1 TABLET BY MOUTH 3 (THREE) TIMES DAILY AS NEEDED FOR MUSCLE SPASMS (PAIN) FOR UP TO 10 DAYS.    furosemide (LASIX) 40 MG tablet Take 1 tablet (40 mg total) by mouth 2 (two) times daily.    lactulose 10 gram/15 ml (CHRONULAC) 10 gram/15 mL (15 mL) solution Take 15 mLs (10 g total) by mouth 3 (three) times daily.    metoprolol tartrate (LOPRESSOR) 25 MG tablet Take 1 tablet (25 mg total) by mouth 2 (two) times daily.    ondansetron (ZOFRAN) 4 MG tablet TAKE 1 TABLET BY MOUTH EVERY 8 (EIGHT) HOURS AS NEEDED FOR NAUSEA FORUP TO 7 DAYS.    ondansetron (ZOFRAN-ODT) 4 MG TbDL Take 1 tablet (4 mg total) by mouth every 8 (eight) hours as needed (nausea).    pantoprazole (PROTONIX) 40 MG tablet Take 1 tablet (40 mg total) by mouth 2 (two) times daily.    rifAXImin (XIFAXAN) 200 mg Tab TAKE 1 TABLET BY MOUTH 2 (TWO) TIMES DAILY BEFORE MEALS FOR 14 DAYS.    spironolactone (ALDACTONE) 100 MG tablet Take 1 tablet (100 mg total) by  mouth 2 (two) times daily.     Family History     None        Tobacco Use    Smoking status: Current Every Day Smoker     Packs/day: 0.25     Years: 45.00     Pack years: 11.25     Types: Cigarettes    Smokeless tobacco: Never Used   Substance and Sexual Activity    Alcohol use: Not Currently    Drug use: Yes     Types: Cocaine    Sexual activity: Not on file     Review of Systems   Unable to perform ROS: Mental status change     Objective:     Vital Signs (Most Recent):  Temp: 98.3 °F (36.8 °C) (06/01/20 1255)  Pulse: 94 (06/01/20 1431)  Resp: 15 (06/01/20 1431)  BP: (!) 115/59 (06/01/20 1431)  SpO2: 97 % (06/01/20 1431) Vital Signs (24h Range):  Temp:  [98.3 °F (36.8 °C)] 98.3 °F (36.8 °C)  Pulse:  [82-96] 94  Resp:  [15-20] 15  SpO2:  [96 %-97 %] 97 %  BP: (107-116)/(58-78) 115/59        Body mass index is 24.33 kg/m².    Physical Exam   Constitutional: She appears well-developed and well-nourished.   HENT:   Head: Normocephalic and atraumatic.   Eyes: EOM are normal.   Neck: Normal range of motion. Neck supple.   Cardiovascular: Normal rate, regular rhythm and normal heart sounds.   No murmur heard.  Pulmonary/Chest: Effort normal and breath sounds normal. No respiratory distress.   Abdominal: Soft. Bowel sounds are normal. She exhibits no distension. There is no tenderness.   Musculoskeletal: Normal range of motion. She exhibits no edema.   Neurological: She is alert.   Oriented to person only. Can follow some commands.    Skin: Skin is warm and dry.         CRANIAL NERVES     CN III, IV, VI   Extraocular motions are normal.        Significant Labs:   CBC:   Recent Labs   Lab 06/01/20  1356   WBC 4.21   HGB 11.1*   HCT 32.8*   *     CMP:   Recent Labs   Lab 06/01/20  1356      K 3.8      CO2 24   *   BUN 24*   CREATININE 2.0*   CALCIUM 8.1*   PROT 7.3   ALBUMIN 2.3*   BILITOT 1.4*   ALKPHOS 161*   AST 43*   ALT 25   ANIONGAP 12   EGFRNONAA 26*       Significant Imaging:   Imaging  Results          X-Ray Chest AP Portable (Final result)  Result time 06/01/20 15:10:26    Final result by Erick Hou MD (06/01/20 15:10:26)                 Impression:      No acute process seen.      Electronically signed by: Erick Hou MD  Date:    06/01/2020  Time:    15:10             Narrative:    EXAMINATION:  XR CHEST AP PORTABLE    CLINICAL HISTORY:  weakness;    FINDINGS:  Single view of the chest.  Comparison 05/16/2020    Cardiac silhouette is normal.  Aorta demonstrates atherosclerotic disease. The lungs demonstrate no evidence of active disease.  No evidence of pleural effusion or pneumothorax.  Bones demonstrate moderate degenerative changes.                               CT Head Without Contrast (Final result)  Result time 06/01/20 15:07:51    Final result by Erick Hou MD (06/01/20 15:07:51)                 Impression:      No acute abnormality.    All CT scans at this facility use dose modulation, iterative reconstruction, and/or weight based dosing when appropriate to reduce radiation dose to as low as reasonable achievable.      Electronically signed by: Erick Hou MD  Date:    06/01/2020  Time:    15:07             Narrative:    EXAMINATION:  CT HEAD WITHOUT CONTRAST    CLINICAL HISTORY:  Confusion/delirium, altered LOC, unexplained;    TECHNIQUE:  Low dose axial CT images obtained throughout the head without intravenous contrast. Sagittal and coronal reconstructions were performed.    All CT scans at this facility use dose modulation, iterative reconstruction, and/or weight based dosing when appropriate to reduce radiation dose to as low as reasonable achievable.    COMPARISON:  None.    FINDINGS:  Intracranial compartment:    Mild periventricular white matter disease.  The brain parenchyma appears otherwise normal.  No parenchymal mass, hemorrhage, edema or major vascular distribution infarct.  Benign stable basal ganglia calcifications.    Ventricles and sulci are normal in  size for age without evidence of hydrocephalus with mild age-related involutional change.    No extra-axial blood or fluid collections.    Skull/extracranial contents (limited evaluation): No fracture.  Trace right mastoid effusion.  Left mastoid air cells and paranasal sinuses are essentially clear.

## 2020-06-02 ENCOUNTER — NURSE TRIAGE (OUTPATIENT)
Dept: ADMINISTRATIVE | Facility: CLINIC | Age: 60
End: 2020-06-02

## 2020-06-02 VITALS
DIASTOLIC BLOOD PRESSURE: 61 MMHG | HEART RATE: 68 BPM | BODY MASS INDEX: 24.35 KG/M2 | OXYGEN SATURATION: 99 % | RESPIRATION RATE: 18 BRPM | WEIGHT: 132.31 LBS | HEIGHT: 62 IN | SYSTOLIC BLOOD PRESSURE: 109 MMHG | TEMPERATURE: 99 F

## 2020-06-02 PROBLEM — K76.82 HEPATIC ENCEPHALOPATHY: Status: RESOLVED | Noted: 2020-06-01 | Resolved: 2020-06-02

## 2020-06-02 LAB
ALBUMIN SERPL BCP-MCNC: 2.1 G/DL (ref 3.5–5.2)
ALP SERPL-CCNC: 140 U/L (ref 55–135)
ALT SERPL W/O P-5'-P-CCNC: 22 U/L (ref 10–44)
ANION GAP SERPL CALC-SCNC: 8 MMOL/L (ref 8–16)
AST SERPL-CCNC: 40 U/L (ref 10–40)
BACTERIA FLD AEROBE CULT: NO GROWTH
BASOPHILS # BLD AUTO: 0.03 K/UL (ref 0–0.2)
BASOPHILS NFR BLD: 0.8 % (ref 0–1.9)
BILIRUB SERPL-MCNC: 1 MG/DL (ref 0.1–1)
BUN SERPL-MCNC: 24 MG/DL (ref 8–23)
CALCIUM SERPL-MCNC: 7.8 MG/DL (ref 8.7–10.5)
CHLORIDE SERPL-SCNC: 106 MMOL/L (ref 95–110)
CO2 SERPL-SCNC: 25 MMOL/L (ref 23–29)
CREAT SERPL-MCNC: 1.6 MG/DL (ref 0.5–1.4)
DIFFERENTIAL METHOD: ABNORMAL
EOSINOPHIL # BLD AUTO: 0.1 K/UL (ref 0–0.5)
EOSINOPHIL NFR BLD: 2.9 % (ref 0–8)
ERYTHROCYTE [DISTWIDTH] IN BLOOD BY AUTOMATED COUNT: 17.1 % (ref 11.5–14.5)
EST. GFR  (AFRICAN AMERICAN): 40 ML/MIN/1.73 M^2
EST. GFR  (NON AFRICAN AMERICAN): 35 ML/MIN/1.73 M^2
GLUCOSE SERPL-MCNC: 84 MG/DL (ref 70–110)
GRAM STN SPEC: NORMAL
GRAM STN SPEC: NORMAL
HCT VFR BLD AUTO: 29.6 % (ref 37–48.5)
HGB BLD-MCNC: 9.6 G/DL (ref 12–16)
IMM GRANULOCYTES # BLD AUTO: 0.02 K/UL (ref 0–0.04)
IMM GRANULOCYTES NFR BLD AUTO: 0.5 % (ref 0–0.5)
INR PPP: 1.3 (ref 0.8–1.2)
LYMPHOCYTES # BLD AUTO: 0.8 K/UL (ref 1–4.8)
LYMPHOCYTES NFR BLD: 22.5 % (ref 18–48)
MCH RBC QN AUTO: 32.9 PG (ref 27–31)
MCHC RBC AUTO-ENTMCNC: 32.4 G/DL (ref 32–36)
MCV RBC AUTO: 101 FL (ref 82–98)
MONOCYTES # BLD AUTO: 0.6 K/UL (ref 0.3–1)
MONOCYTES NFR BLD: 16.6 % (ref 4–15)
NEUTROPHILS # BLD AUTO: 2.1 K/UL (ref 1.8–7.7)
NEUTROPHILS NFR BLD: 56.7 % (ref 38–73)
NRBC BLD-RTO: 0 /100 WBC
PLATELET # BLD AUTO: 104 K/UL (ref 150–350)
PMV BLD AUTO: 12.3 FL (ref 9.2–12.9)
POTASSIUM SERPL-SCNC: 3.5 MMOL/L (ref 3.5–5.1)
PROT SERPL-MCNC: 6.4 G/DL (ref 6–8.4)
PROTHROMBIN TIME: 14 SEC (ref 9–12.5)
RBC # BLD AUTO: 2.92 M/UL (ref 4–5.4)
SODIUM SERPL-SCNC: 139 MMOL/L (ref 136–145)
WBC # BLD AUTO: 3.74 K/UL (ref 3.9–12.7)

## 2020-06-02 PROCEDURE — 80053 COMPREHEN METABOLIC PANEL: CPT

## 2020-06-02 PROCEDURE — 85610 PROTHROMBIN TIME: CPT

## 2020-06-02 PROCEDURE — 36415 COLL VENOUS BLD VENIPUNCTURE: CPT

## 2020-06-02 PROCEDURE — 25000003 PHARM REV CODE 250: Performed by: INTERNAL MEDICINE

## 2020-06-02 PROCEDURE — 85025 COMPLETE CBC W/AUTO DIFF WBC: CPT

## 2020-06-02 PROCEDURE — 25000003 PHARM REV CODE 250: Performed by: NURSE PRACTITIONER

## 2020-06-02 PROCEDURE — G0378 HOSPITAL OBSERVATION PER HR: HCPCS

## 2020-06-02 RX ADMIN — METOPROLOL TARTRATE 25 MG: 25 TABLET ORAL at 09:06

## 2020-06-02 RX ADMIN — SPIRONOLACTONE 100 MG: 25 TABLET ORAL at 09:06

## 2020-06-02 RX ADMIN — LACTULOSE 10 G: 20 SOLUTION ORAL at 09:06

## 2020-06-02 RX ADMIN — FUROSEMIDE 40 MG: 40 TABLET ORAL at 09:06

## 2020-06-02 RX ADMIN — PANTOPRAZOLE SODIUM 40 MG: 40 TABLET, DELAYED RELEASE ORAL at 09:06

## 2020-06-02 RX ADMIN — RIFAXIMIN 550 MG: 550 TABLET ORAL at 09:06

## 2020-06-02 NOTE — PLAN OF CARE
06/02/20 1546   Final Note   Assessment Type Final Discharge Note   Anticipated Discharge Disposition Home   Right Care Referral Info   Post Acute Recommendation No Care

## 2020-06-02 NOTE — HOSPITAL COURSE
Patient 58 yo female placed in observation with AMS Hepatic encephalopathy. Patient Ammonia 60 was given lactulose with good effect. Patient had paracentesis with removal of 3.5 liters of fluid. Patient improved mental status on exam today. Patient counseled on illicit drug usage and patient expressed understanding. Patient instructed to follow up with GI for ongoing management of her Cirrhosis.

## 2020-06-02 NOTE — PLAN OF CARE
Pt remain free falls and injuries this shift, pt pain is controlled, pt is resting comfortably with call light within reach, will continue to monitor

## 2020-06-02 NOTE — PLAN OF CARE
Pt will be discharged home with  via wheelchair, discharge instructions and medications discussed with pt, pt verbalized understanding, cathter removed pt tolerated well, iv removed tip intact.

## 2020-06-02 NOTE — PLAN OF CARE
SW met with patient at bedside to complete discharge planning assessment. Patient lives with her SO derek and states he takes care of all her needs. She uses a walker and bedside commode at home. She denies any other assistive equipment. Patient states she moved in with derek due to her mom getting sick and not able to care for her. Patient states she had home health awhile ago but couldn't recall when. SW asked patient if she wanted HH services, she asked if the services would include helping derek with things around the house for her. SW explained HH services vs aide services to pt. She stated she didn't need HH services. She expresses she wants aide services, SW explained insurances will likely not cover. Patient denies LW/POA. She states derek will pick her up at the time of discharge. She see's Dr. Green a liver specialist [facility]. No other CM needs identified at this time. SW left discharge folder/advance directive assistance pamphlet at bedside. SW updated board and instructed patient to call if any needs.     D/C plan: Home with family     [facility] - [address] [address]  Phone: [phone] Fax: [phone]    Bedside: Yes  Transportation: Derek  Portal: Active     06/02/20 1231   Discharge Assessment   Assessment Type Discharge Planning Assessment   Confirmed/corrected address and phone number on facesheet? Yes   Assessment information obtained from? Patient   Communicated expected length of stay with patient/caregiver no   Prior to hospitilization cognitive status: Alert/Oriented   Prior to hospitalization functional status: Assistive Equipment   Current cognitive status: Alert/Oriented   Current Functional Status: Assistive Equipment   Facility Arrived From: Home    Lives With significant other   Able to Return to Prior Arrangements yes   Is patient able to care for self after discharge? Unable to determine at this time  (comments)   Who are your caregiver(s) and their phone number(s)? Derek Gardiner [phone]   Readmission Within the Last 30 Days no previous admission in last 30 days   Patient currently being followed by outpatient case management? No   Patient currently receives any other outside agency services? No   Equipment Currently Used at Home bedside commode;walker, rolling   Do you have any problems affording any of your prescribed medications? TBD   Is the patient taking medications as prescribed? yes   Does the patient have transportation home? Yes   Transportation Anticipated family or friend will provide   Does the patient receive services at the [facility]? No   Discharge Plan A Home with family   Discharge Plan B Home   DME Needed Upon Discharge  none   Patient/Family in Agreement with Plan yes

## 2020-06-02 NOTE — DISCHARGE SUMMARY
[facility] Hospital Medicine  Discharge Summary      Patient Name: Jossy Siegel  MRN: [id number]  Admission Date: 6/1/2020  Hospital Length of Stay: 0 days  Discharge Date and Time: 6/2/2020  4:49 PM  Attending Physician: No att. providers found   Discharging Provider: Joshua Colvin MD  Primary Care Provider: Primary Doctor No      HPI:   Jossy Siegel is a 59 year old female with history of pancytopenia, hepatitis and alcoholic cirrhosis who presents to ED for further evaluation of altered mental status. Patient is not compliant with her lactulose at home. She was lethargic upon arrival, but is not awake. She is a poor historian.     In ED, ammonia level is 60. UDS is pending. CT head and CXR are unremarkable. Hospital medicine has been consulted for admission.       * No surgery found *      Hospital Course:   Patient 60 yo female placed in observation with AMS Hepatic encephalopathy. Patient Ammonia 60 was given lactulose with good effect. Patient had paracentesis with removal of 3.5 liters of fluid. Patient improved mental status on exam today. Patient counseled on illicit drug usage and patient expressed understanding. Patient instructed to follow up with GI for ongoing management of her Cirrhosis.      Consults:     No new Assessment & Plan notes have been filed under this hospital service since the last note was generated.  Service: Hospital Medicine    Final Active Diagnoses:    Diagnosis Date Noted POA    Cocaine abuse [F14.10] 05/13/2020 Yes    Tobacco abuse [Z72.0] 04/22/2020 Yes     Chronic    Cirrhosis of liver with ascites related to hepatitis C virus [K74.60, R18.8] 02/17/2020 Yes     Chronic    Pancytopenia [D61.818] 02/05/2020 Yes     Chronic      Problems Resolved During this Admission:    Diagnosis Date Noted Date Resolved POA    PRINCIPAL PROBLEM:  Hepatic encephalopathy [K72.90] 06/01/2020 06/02/2020 Yes    Anemia [D64.9] 03/25/2015 06/02/2020 Yes        Discharged Condition: stable    Disposition: Home or Self Care    Follow Up:  Follow-up Information     [facility] In 3 days.    Specialty:  General Practice  Contact information:  [address]  [facility] [address] [phone]             Chidi Stuart PA-C In 2 weeks.    Specialty:  Gastroenterology  Contact information:  [address]  [place] [address]  [phone]                 Patient Instructions:      Diet renal     Diet Cardiac     Activity as tolerated       Significant Diagnostic Studies: Labs:   BMP:   Recent Labs   Lab 06/01/20  1356 06/02/20  0440   * 84    139   K 3.8 3.5    106   CO2 24 25   BUN 24* 24*   CREATININE 2.0* 1.6*   CALCIUM 8.1* 7.8*   , CMP   Recent Labs   Lab 06/01/20  1356 06/02/20  0440    139   K 3.8 3.5    106   CO2 24 25   * 84   BUN 24* 24*   CREATININE 2.0* 1.6*   CALCIUM 8.1* 7.8*   PROT 7.3 6.4   ALBUMIN 2.3* 2.1*   BILITOT 1.4* 1.0   ALKPHOS 161* 140*   AST 43* 40   ALT 25 22   ANIONGAP 12 8   ESTGFRAFRICA 30* 40*   EGFRNONAA 26* 35*   , CBC   Recent Labs   Lab 06/01/20  1356 06/02/20  0440   WBC 4.21 3.74*   HGB 11.1* 9.6*   HCT 32.8* 29.6*   * 104*   , Troponin No results for input(s): TROPONINI in the last 168 hours. and All labs within the past 24 hours have been reviewed    Pending Diagnostic Studies:     None         Medications:  Reconciled Home Medications:      Medication List      CONTINUE taking these medications    albuterol 90 mcg/actuation inhaler  Commonly known as:  PROVENTIL/VENTOLIN HFA  Inhale 2 puffs into the lungs every 6 (six) hours.     amitriptyline 25 MG tablet  Commonly known as:  ELAVIL  Take 25 mg by mouth every evening.     ciprofloxacin HCl 500 MG tablet  Commonly known as:  CIPRO  Take 1 tablet (500 mg total) by mouth once a week.     cyclobenzaprine 10 MG tablet  Commonly known as:  FLEXERIL  TAKE 1 TABLET BY MOUTH 3 (THREE) TIMES DAILY AS NEEDED FOR MUSCLE SPASMS (PAIN)  FOR UP TO 10 DAYS.     furosemide 40 MG tablet  Commonly known as:  LASIX  Take 1 tablet (40 mg total) by mouth 2 (two) times daily.     lactulose 10 gram/15 ml 10 gram/15 mL (15 mL) solution  Commonly known as:  CHRONULAC  Take 15 mLs (10 g total) by mouth 3 (three) times daily.     metoprolol tartrate 25 MG tablet  Commonly known as:  LOPRESSOR  Take 1 tablet (25 mg total) by mouth 2 (two) times daily.     ondansetron 4 MG tablet  Commonly known as:  ZOFRAN  TAKE 1 TABLET BY MOUTH EVERY 8 (EIGHT) HOURS AS NEEDED FOR NAUSEA FORUP TO 7 DAYS.     ondansetron 4 MG Tbdl  Commonly known as:  ZOFRAN-ODT  Take 1 tablet (4 mg total) by mouth every 8 (eight) hours as needed (nausea).     pantoprazole 40 MG tablet  Commonly known as:  PROTONIX  Take 1 tablet (40 mg total) by mouth 2 (two) times daily.     rifAXImin 200 mg Tab  Commonly known as:  XIFAXAN  TAKE 1 TABLET BY MOUTH 2 (TWO) TIMES DAILY BEFORE MEALS FOR 14 DAYS.     spironolactone 100 MG tablet  Commonly known as:  ALDACTONE  Take 1 tablet (100 mg total) by mouth 2 (two) times daily.            Indwelling Lines/Drains at time of discharge:   Lines/Drains/Airways     None                 Time spent on the discharge of patient: 45 minutes  Patient was seen and examined on the date of discharge and determined to be suitable for discharge.         Joshua Colvin MD  Department of Hospital Medicine  [facility]

## 2020-06-02 NOTE — PLAN OF CARE
Rested in bed, denied any pain or discomfort. Neuro checks q4hrs, currently oriented to person, place, and time; reoriented to situation. Forgetfulness noted, avasys in use, reminded to call for assistance prior to getting out of bed. Safety precautions continued, chart check complete, will continue to monitor.

## 2020-06-05 ENCOUNTER — HOSPITAL ENCOUNTER (OUTPATIENT)
Dept: RADIOLOGY | Facility: HOSPITAL | Age: 60
Discharge: HOME | DRG: 441 | End: 2020-06-05
Attending: INTERNAL MEDICINE
Payer: MEDICAID

## 2020-06-05 VITALS
SYSTOLIC BLOOD PRESSURE: 100 MMHG | WEIGHT: 130 LBS | DIASTOLIC BLOOD PRESSURE: 59 MMHG | HEART RATE: 71 BPM | HEIGHT: 62 IN | BODY MASS INDEX: 23.92 KG/M2 | RESPIRATION RATE: 16 BRPM | OXYGEN SATURATION: 100 %

## 2020-06-05 DIAGNOSIS — R18.8 OTHER ASCITES: ICD-10-CM

## 2020-06-05 LAB
APPEARANCE FLD: CLEAR
BACTERIA SPEC ANAEROBE CULT: NORMAL
BODY FLD TYPE: NORMAL
COLOR FLD: YELLOW
LYMPHOCYTES NFR FLD MANUAL: 22 %
MONOS+MACROS NFR FLD MANUAL: 72 %
NEUTROPHILS NFR FLD MANUAL: 6 %
PATH INTERP FLD-IMP: NORMAL
WBC # FLD: 69 /CU MM

## 2020-06-05 PROCEDURE — 87070 CULTURE OTHR SPECIMN AEROBIC: CPT

## 2020-06-05 PROCEDURE — 87075 CULTR BACTERIA EXCEPT BLOOD: CPT

## 2020-06-05 PROCEDURE — 87205 SMEAR GRAM STAIN: CPT

## 2020-06-05 PROCEDURE — 89051 BODY FLUID CELL COUNT: CPT

## 2020-06-05 PROCEDURE — 49083 ABD PARACENTESIS W/IMAGING: CPT

## 2020-06-05 NOTE — DISCHARGE SUMMARY
Pre Op Diagnosis: ascites     Post Op Diagnosis: same     Procedure:  para     Procedure performed by: Ameya MONTENEGRO, Herbert ONTIVEROS     Written Informed Consent Obtained: Yes     Specimen Removed:  yes     Estimated Blood Loss:  minimal     Findings: Local anesthesia.     The patient tolerated the procedure well and there were no complications.      Sterile technique was performed in the LLQ, lidocaine was used as a local anesthetic.   2.5 liters of colette fluid removed for therapeutic purposes.  Pt tolerated the procedure well without immediate complications.  Please see radiologist report for details. F/u with PCP and/or ordering physician.

## 2020-06-05 NOTE — PLAN OF CARE
2.5 liters of light colette fluid removed during paracentesis. Pt tolerated procedure well, VSS, no acute distress noted. Band aid placed to right flank puncture site C/D/I with no bleeding noted. Discharge papers given to and reviewed with pt and pt verbalized understanding of all. Pt discharged home, ambulated out and driven home by SO.

## 2020-06-05 NOTE — DISCHARGE INSTRUCTIONS

## 2020-06-05 NOTE — INTERVAL H&P NOTE
The patient has been examined and the H&P has been reviewed:    I concur with the findings and no changes have occurred since H&P was written.        There are no hospital problems to display for this patient.    
no

## 2020-06-06 ENCOUNTER — HOSPITAL ENCOUNTER (INPATIENT)
Facility: HOSPITAL | Age: 60
LOS: 5 days | Discharge: HOSPICE | DRG: 441 | End: 2020-06-11
Attending: EMERGENCY MEDICINE | Admitting: INTERNAL MEDICINE
Payer: MEDICAID

## 2020-06-06 DIAGNOSIS — R18.8 CIRRHOSIS OF LIVER WITH ASCITES, UNSPECIFIED HEPATIC CIRRHOSIS TYPE: Chronic | ICD-10-CM

## 2020-06-06 DIAGNOSIS — K65.2 SBP (SPONTANEOUS BACTERIAL PERITONITIS): ICD-10-CM

## 2020-06-06 DIAGNOSIS — K74.60 CIRRHOSIS OF LIVER WITH ASCITES, UNSPECIFIED HEPATIC CIRRHOSIS TYPE: Chronic | ICD-10-CM

## 2020-06-06 DIAGNOSIS — R78.81 BACTEREMIA: ICD-10-CM

## 2020-06-06 DIAGNOSIS — K76.82 ACUTE HEPATIC ENCEPHALOPATHY: Primary | ICD-10-CM

## 2020-06-06 LAB
ALBUMIN SERPL BCP-MCNC: 2.4 G/DL (ref 3.5–5.2)
ALP SERPL-CCNC: 181 U/L (ref 55–135)
ALT SERPL W/O P-5'-P-CCNC: 27 U/L (ref 10–44)
AMMONIA PLAS-SCNC: 139 UMOL/L (ref 10–50)
ANION GAP SERPL CALC-SCNC: 13 MMOL/L (ref 8–16)
AST SERPL-CCNC: 44 U/L (ref 10–40)
BASOPHILS # BLD AUTO: 0.02 K/UL (ref 0–0.2)
BASOPHILS NFR BLD: 0.6 % (ref 0–1.9)
BILIRUB SERPL-MCNC: 1.1 MG/DL (ref 0.1–1)
BILIRUB UR QL STRIP: NEGATIVE
BUN SERPL-MCNC: 22 MG/DL (ref 8–23)
CALCIUM SERPL-MCNC: 7.9 MG/DL (ref 8.7–10.5)
CHLORIDE SERPL-SCNC: 106 MMOL/L (ref 95–110)
CLARITY UR: CLEAR
CO2 SERPL-SCNC: 21 MMOL/L (ref 23–29)
COLOR UR: YELLOW
CREAT SERPL-MCNC: 2.3 MG/DL (ref 0.5–1.4)
DIFFERENTIAL METHOD: ABNORMAL
EOSINOPHIL # BLD AUTO: 0.1 K/UL (ref 0–0.5)
EOSINOPHIL NFR BLD: 2.8 % (ref 0–8)
ERYTHROCYTE [DISTWIDTH] IN BLOOD BY AUTOMATED COUNT: 16.7 % (ref 11.5–14.5)
EST. GFR  (AFRICAN AMERICAN): 26 ML/MIN/1.73 M^2
EST. GFR  (NON AFRICAN AMERICAN): 22 ML/MIN/1.73 M^2
ETHANOL SERPL-MCNC: <10 MG/DL
GLUCOSE SERPL-MCNC: 92 MG/DL (ref 70–110)
GLUCOSE UR QL STRIP: NEGATIVE
HCT VFR BLD AUTO: 32.1 % (ref 37–48.5)
HGB BLD-MCNC: 10.7 G/DL (ref 12–16)
HGB UR QL STRIP: NEGATIVE
IMM GRANULOCYTES # BLD AUTO: 0.04 K/UL (ref 0–0.04)
IMM GRANULOCYTES NFR BLD AUTO: 1.1 % (ref 0–0.5)
INR PPP: 1.3 (ref 0.8–1.2)
KETONES UR QL STRIP: NEGATIVE
LACTATE SERPL-SCNC: 2.5 MMOL/L (ref 0.5–2.2)
LEUKOCYTE ESTERASE UR QL STRIP: NEGATIVE
LIPASE SERPL-CCNC: 38 U/L (ref 4–60)
LYMPHOCYTES # BLD AUTO: 0.6 K/UL (ref 1–4.8)
LYMPHOCYTES NFR BLD: 16.2 % (ref 18–48)
MCH RBC QN AUTO: 33.5 PG (ref 27–31)
MCHC RBC AUTO-ENTMCNC: 33.3 G/DL (ref 32–36)
MCV RBC AUTO: 101 FL (ref 82–98)
MONOCYTES # BLD AUTO: 0.5 K/UL (ref 0.3–1)
MONOCYTES NFR BLD: 15.3 % (ref 4–15)
NEUTROPHILS # BLD AUTO: 2.3 K/UL (ref 1.8–7.7)
NEUTROPHILS NFR BLD: 64 % (ref 38–73)
NITRITE UR QL STRIP: NEGATIVE
NRBC BLD-RTO: 0 /100 WBC
PH UR STRIP: 6 [PH] (ref 5–8)
PLATELET # BLD AUTO: 112 K/UL (ref 150–350)
PMV BLD AUTO: 12.2 FL (ref 9.2–12.9)
POTASSIUM SERPL-SCNC: 4.3 MMOL/L (ref 3.5–5.1)
PROT SERPL-MCNC: 7.1 G/DL (ref 6–8.4)
PROT UR QL STRIP: NEGATIVE
PROTHROMBIN TIME: 13.5 SEC (ref 9–12.5)
RBC # BLD AUTO: 3.19 M/UL (ref 4–5.4)
SARS-COV-2 RDRP RESP QL NAA+PROBE: NEGATIVE
SODIUM SERPL-SCNC: 140 MMOL/L (ref 136–145)
SP GR UR STRIP: 1.01 (ref 1–1.03)
TROPONIN I SERPL DL<=0.01 NG/ML-MCNC: 0.01 NG/ML (ref 0–0.03)
URN SPEC COLLECT METH UR: NORMAL
UROBILINOGEN UR STRIP-ACNC: NEGATIVE EU/DL
WBC # BLD AUTO: 3.52 K/UL (ref 3.9–12.7)

## 2020-06-06 PROCEDURE — 82140 ASSAY OF AMMONIA: CPT

## 2020-06-06 PROCEDURE — 99291 CRITICAL CARE FIRST HOUR: CPT | Mod: 25

## 2020-06-06 PROCEDURE — 81003 URINALYSIS AUTO W/O SCOPE: CPT

## 2020-06-06 PROCEDURE — 87040 BLOOD CULTURE FOR BACTERIA: CPT | Mod: 59

## 2020-06-06 PROCEDURE — U0002 COVID-19 LAB TEST NON-CDC: HCPCS

## 2020-06-06 PROCEDURE — 51702 INSERT TEMP BLADDER CATH: CPT

## 2020-06-06 PROCEDURE — 85610 PROTHROMBIN TIME: CPT

## 2020-06-06 PROCEDURE — 83690 ASSAY OF LIPASE: CPT

## 2020-06-06 PROCEDURE — 80053 COMPREHEN METABOLIC PANEL: CPT

## 2020-06-06 PROCEDURE — 80307 DRUG TEST PRSMV CHEM ANLYZR: CPT

## 2020-06-06 PROCEDURE — 83605 ASSAY OF LACTIC ACID: CPT

## 2020-06-06 PROCEDURE — 11000001 HC ACUTE MED/SURG PRIVATE ROOM

## 2020-06-06 PROCEDURE — 85025 COMPLETE CBC W/AUTO DIFF WBC: CPT

## 2020-06-06 PROCEDURE — 80320 DRUG SCREEN QUANTALCOHOLS: CPT

## 2020-06-06 PROCEDURE — 84484 ASSAY OF TROPONIN QUANT: CPT

## 2020-06-06 PROCEDURE — 36415 COLL VENOUS BLD VENIPUNCTURE: CPT

## 2020-06-06 RX ORDER — LACTULOSE 10 G/15ML
200 SOLUTION ORAL; RECTAL
Status: COMPLETED | OUTPATIENT
Start: 2020-06-07 | End: 2020-06-07

## 2020-06-06 RX ORDER — LACTULOSE 10 G/15ML
30 SOLUTION ORAL
Status: DISCONTINUED | OUTPATIENT
Start: 2020-06-06 | End: 2020-06-06

## 2020-06-07 PROBLEM — E43 SEVERE MALNUTRITION: Status: ACTIVE | Noted: 2020-06-07

## 2020-06-07 PROBLEM — K76.82 ACUTE HEPATIC ENCEPHALOPATHY: Status: ACTIVE | Noted: 2020-05-13

## 2020-06-07 PROBLEM — N17.9 AKI (ACUTE KIDNEY INJURY): Status: ACTIVE | Noted: 2020-06-07

## 2020-06-07 PROBLEM — K76.82 ACUTE HEPATIC ENCEPHALOPATHY: Status: ACTIVE | Noted: 2020-06-07

## 2020-06-07 PROBLEM — F19.10 POLYSUBSTANCE ABUSE: Status: ACTIVE | Noted: 2020-04-22

## 2020-06-07 LAB
ALBUMIN SERPL BCP-MCNC: 2.2 G/DL (ref 3.5–5.2)
ALBUMIN SERPL BCP-MCNC: 2.3 G/DL (ref 3.5–5.2)
ALP SERPL-CCNC: 149 U/L (ref 55–135)
ALP SERPL-CCNC: 161 U/L (ref 55–135)
ALT SERPL W/O P-5'-P-CCNC: 24 U/L (ref 10–44)
ALT SERPL W/O P-5'-P-CCNC: 28 U/L (ref 10–44)
AMMONIA PLAS-SCNC: 38 UMOL/L (ref 10–50)
AMPHET+METHAMPHET UR QL: NORMAL
ANION GAP SERPL CALC-SCNC: 10 MMOL/L (ref 8–16)
ANION GAP SERPL CALC-SCNC: 12 MMOL/L (ref 8–16)
AST SERPL-CCNC: 39 U/L (ref 10–40)
AST SERPL-CCNC: 45 U/L (ref 10–40)
BARBITURATES UR QL SCN>200 NG/ML: NEGATIVE
BASOPHILS # BLD AUTO: 0.04 K/UL (ref 0–0.2)
BASOPHILS NFR BLD: 0.6 % (ref 0–1.9)
BENZODIAZ UR QL SCN>200 NG/ML: NEGATIVE
BILIRUB SERPL-MCNC: 1.3 MG/DL (ref 0.1–1)
BILIRUB SERPL-MCNC: 2.5 MG/DL (ref 0.1–1)
BUN SERPL-MCNC: 27 MG/DL (ref 8–23)
BUN SERPL-MCNC: 31 MG/DL (ref 8–23)
BZE UR QL SCN: NORMAL
CALCIUM SERPL-MCNC: 7.7 MG/DL (ref 8.7–10.5)
CALCIUM SERPL-MCNC: 8.7 MG/DL (ref 8.7–10.5)
CANNABINOIDS UR QL SCN: NEGATIVE
CHLORIDE SERPL-SCNC: 108 MMOL/L (ref 95–110)
CHLORIDE SERPL-SCNC: 109 MMOL/L (ref 95–110)
CO2 SERPL-SCNC: 19 MMOL/L (ref 23–29)
CO2 SERPL-SCNC: 24 MMOL/L (ref 23–29)
CREAT SERPL-MCNC: 2.3 MG/DL (ref 0.5–1.4)
CREAT SERPL-MCNC: 2.6 MG/DL (ref 0.5–1.4)
CREAT UR-MCNC: 49.3 MG/DL (ref 15–325)
DIFFERENTIAL METHOD: ABNORMAL
EOSINOPHIL # BLD AUTO: 0.1 K/UL (ref 0–0.5)
EOSINOPHIL NFR BLD: 1.1 % (ref 0–8)
ERYTHROCYTE [DISTWIDTH] IN BLOOD BY AUTOMATED COUNT: 17.2 % (ref 11.5–14.5)
EST. GFR  (AFRICAN AMERICAN): 22 ML/MIN/1.73 M^2
EST. GFR  (AFRICAN AMERICAN): 26 ML/MIN/1.73 M^2
EST. GFR  (NON AFRICAN AMERICAN): 19 ML/MIN/1.73 M^2
EST. GFR  (NON AFRICAN AMERICAN): 22 ML/MIN/1.73 M^2
GLUCOSE SERPL-MCNC: 149 MG/DL (ref 70–110)
GLUCOSE SERPL-MCNC: 93 MG/DL (ref 70–110)
HCT VFR BLD AUTO: 35.7 % (ref 37–48.5)
HGB BLD-MCNC: 11.4 G/DL (ref 12–16)
IMM GRANULOCYTES # BLD AUTO: 0.04 K/UL (ref 0–0.04)
IMM GRANULOCYTES NFR BLD AUTO: 0.6 % (ref 0–0.5)
INR PPP: 1.3 (ref 0.8–1.2)
LYMPHOCYTES # BLD AUTO: 0.6 K/UL (ref 1–4.8)
LYMPHOCYTES NFR BLD: 9 % (ref 18–48)
MAGNESIUM SERPL-MCNC: 1.8 MG/DL (ref 1.6–2.6)
MCH RBC QN AUTO: 32.4 PG (ref 27–31)
MCHC RBC AUTO-ENTMCNC: 31.9 G/DL (ref 32–36)
MCV RBC AUTO: 101 FL (ref 82–98)
METHADONE UR QL SCN>300 NG/ML: NEGATIVE
MONOCYTES # BLD AUTO: 0.6 K/UL (ref 0.3–1)
MONOCYTES NFR BLD: 9.5 % (ref 4–15)
NEUTROPHILS # BLD AUTO: 5 K/UL (ref 1.8–7.7)
NEUTROPHILS NFR BLD: 79.2 % (ref 38–73)
NRBC BLD-RTO: 0 /100 WBC
OPIATES UR QL SCN: NEGATIVE
PCP UR QL SCN>25 NG/ML: NEGATIVE
PHOSPHATE SERPL-MCNC: 5.1 MG/DL (ref 2.7–4.5)
PLATELET # BLD AUTO: 121 K/UL (ref 150–350)
PMV BLD AUTO: 12 FL (ref 9.2–12.9)
POTASSIUM SERPL-SCNC: 4.3 MMOL/L (ref 3.5–5.1)
POTASSIUM SERPL-SCNC: 5.2 MMOL/L (ref 3.5–5.1)
PREALB SERPL-MCNC: 7 MG/DL (ref 20–43)
PROCALCITONIN SERPL IA-MCNC: 0.62 NG/ML
PROT SERPL-MCNC: 6.8 G/DL (ref 6–8.4)
PROT SERPL-MCNC: 7.2 G/DL (ref 6–8.4)
PROTHROMBIN TIME: 13.3 SEC (ref 9–12.5)
RBC # BLD AUTO: 3.52 M/UL (ref 4–5.4)
SODIUM SERPL-SCNC: 139 MMOL/L (ref 136–145)
SODIUM SERPL-SCNC: 143 MMOL/L (ref 136–145)
TOXICOLOGY INFORMATION: NORMAL
WBC # BLD AUTO: 6.33 K/UL (ref 3.9–12.7)

## 2020-06-07 PROCEDURE — 63600175 PHARM REV CODE 636 W HCPCS: Performed by: PHYSICIAN ASSISTANT

## 2020-06-07 PROCEDURE — 97165 OT EVAL LOW COMPLEX 30 MIN: CPT

## 2020-06-07 PROCEDURE — 63600175 PHARM REV CODE 636 W HCPCS: Performed by: INTERNAL MEDICINE

## 2020-06-07 PROCEDURE — 84100 ASSAY OF PHOSPHORUS: CPT

## 2020-06-07 PROCEDURE — 25000003 PHARM REV CODE 250: Performed by: EMERGENCY MEDICINE

## 2020-06-07 PROCEDURE — 97161 PT EVAL LOW COMPLEX 20 MIN: CPT

## 2020-06-07 PROCEDURE — 85025 COMPLETE CBC W/AUTO DIFF WBC: CPT

## 2020-06-07 PROCEDURE — 11000001 HC ACUTE MED/SURG PRIVATE ROOM

## 2020-06-07 PROCEDURE — 80053 COMPREHEN METABOLIC PANEL: CPT | Mod: 91

## 2020-06-07 PROCEDURE — 97530 THERAPEUTIC ACTIVITIES: CPT

## 2020-06-07 PROCEDURE — 25000003 PHARM REV CODE 250: Performed by: PHYSICIAN ASSISTANT

## 2020-06-07 PROCEDURE — 80053 COMPREHEN METABOLIC PANEL: CPT

## 2020-06-07 PROCEDURE — 21400001 HC TELEMETRY ROOM

## 2020-06-07 PROCEDURE — 25000003 PHARM REV CODE 250: Performed by: INTERNAL MEDICINE

## 2020-06-07 PROCEDURE — 25000003 PHARM REV CODE 250: Performed by: NURSE PRACTITIONER

## 2020-06-07 PROCEDURE — 36415 COLL VENOUS BLD VENIPUNCTURE: CPT

## 2020-06-07 PROCEDURE — 84145 PROCALCITONIN (PCT): CPT

## 2020-06-07 PROCEDURE — 85610 PROTHROMBIN TIME: CPT

## 2020-06-07 PROCEDURE — 63600175 PHARM REV CODE 636 W HCPCS: Performed by: NURSE PRACTITIONER

## 2020-06-07 PROCEDURE — 83735 ASSAY OF MAGNESIUM: CPT

## 2020-06-07 PROCEDURE — 84134 ASSAY OF PREALBUMIN: CPT

## 2020-06-07 PROCEDURE — 82140 ASSAY OF AMMONIA: CPT

## 2020-06-07 PROCEDURE — 25500020 PHARM REV CODE 255: Performed by: INTERNAL MEDICINE

## 2020-06-07 RX ORDER — AMITRIPTYLINE HYDROCHLORIDE 25 MG/1
25 TABLET, FILM COATED ORAL NIGHTLY
Status: DISCONTINUED | OUTPATIENT
Start: 2020-06-07 | End: 2020-06-11 | Stop reason: HOSPADM

## 2020-06-07 RX ORDER — METOPROLOL TARTRATE 25 MG/1
25 TABLET, FILM COATED ORAL 2 TIMES DAILY
Status: DISCONTINUED | OUTPATIENT
Start: 2020-06-07 | End: 2020-06-11 | Stop reason: HOSPADM

## 2020-06-07 RX ORDER — MORPHINE SULFATE 2 MG/ML
1 INJECTION, SOLUTION INTRAMUSCULAR; INTRAVENOUS EVERY 4 HOURS PRN
Status: DISCONTINUED | OUTPATIENT
Start: 2020-06-07 | End: 2020-06-11 | Stop reason: HOSPADM

## 2020-06-07 RX ORDER — VANCOMYCIN HCL IN 5 % DEXTROSE 1G/250ML
15 PLASTIC BAG, INJECTION (ML) INTRAVENOUS ONCE
Status: COMPLETED | OUTPATIENT
Start: 2020-06-07 | End: 2020-06-07

## 2020-06-07 RX ORDER — LACTULOSE 10 G/15ML
200 SOLUTION ORAL; RECTAL EVERY 6 HOURS
Status: DISCONTINUED | OUTPATIENT
Start: 2020-06-07 | End: 2020-06-07

## 2020-06-07 RX ORDER — PANTOPRAZOLE SODIUM 40 MG/1
40 TABLET, DELAYED RELEASE ORAL 2 TIMES DAILY
Status: DISCONTINUED | OUTPATIENT
Start: 2020-06-07 | End: 2020-06-11 | Stop reason: HOSPADM

## 2020-06-07 RX ORDER — LACTULOSE 10 G/15ML
20 SOLUTION ORAL 4 TIMES DAILY
Status: DISCONTINUED | OUTPATIENT
Start: 2020-06-07 | End: 2020-06-08

## 2020-06-07 RX ORDER — ONDANSETRON 8 MG/1
8 TABLET, ORALLY DISINTEGRATING ORAL EVERY 8 HOURS PRN
Status: DISCONTINUED | OUTPATIENT
Start: 2020-06-07 | End: 2020-06-11 | Stop reason: HOSPADM

## 2020-06-07 RX ORDER — CIPROFLOXACIN 500 MG/1
500 TABLET ORAL
Status: DISCONTINUED | OUTPATIENT
Start: 2020-06-07 | End: 2020-06-07

## 2020-06-07 RX ORDER — ACETAMINOPHEN 325 MG/1
650 TABLET ORAL ONCE
Status: COMPLETED | OUTPATIENT
Start: 2020-06-07 | End: 2020-06-07

## 2020-06-07 RX ORDER — SODIUM CHLORIDE 9 MG/ML
INJECTION, SOLUTION INTRAVENOUS CONTINUOUS
Status: DISCONTINUED | OUTPATIENT
Start: 2020-06-07 | End: 2020-06-07

## 2020-06-07 RX ORDER — MORPHINE SULFATE 2 MG/ML
1 INJECTION, SOLUTION INTRAMUSCULAR; INTRAVENOUS EVERY 4 HOURS PRN
Status: DISCONTINUED | OUTPATIENT
Start: 2020-06-07 | End: 2020-06-07

## 2020-06-07 RX ORDER — CIPROFLOXACIN 500 MG/1
500 TABLET ORAL WEEKLY
Status: DISCONTINUED | OUTPATIENT
Start: 2020-06-07 | End: 2020-06-07

## 2020-06-07 RX ORDER — MORPHINE SULFATE 2 MG/ML
0.5 INJECTION, SOLUTION INTRAMUSCULAR; INTRAVENOUS EVERY 4 HOURS PRN
Status: DISCONTINUED | OUTPATIENT
Start: 2020-06-07 | End: 2020-06-11 | Stop reason: HOSPADM

## 2020-06-07 RX ORDER — HEPARIN SODIUM 5000 [USP'U]/ML
5000 INJECTION, SOLUTION INTRAVENOUS; SUBCUTANEOUS EVERY 8 HOURS
Status: DISCONTINUED | OUTPATIENT
Start: 2020-06-07 | End: 2020-06-09

## 2020-06-07 RX ADMIN — HEPARIN SODIUM 5000 UNITS: 5000 INJECTION INTRAVENOUS; SUBCUTANEOUS at 09:06

## 2020-06-07 RX ADMIN — RIFAXIMIN 550 MG: 550 TABLET ORAL at 12:06

## 2020-06-07 RX ADMIN — PANTOPRAZOLE SODIUM 40 MG: 40 TABLET, DELAYED RELEASE ORAL at 09:06

## 2020-06-07 RX ADMIN — MORPHINE SULFATE 1 MG: 2 INJECTION, SOLUTION INTRAMUSCULAR; INTRAVENOUS at 07:06

## 2020-06-07 RX ADMIN — ACETAMINOPHEN 650 MG: 325 TABLET ORAL at 05:06

## 2020-06-07 RX ADMIN — CIPROFLOXACIN 500 MG: 500 TABLET, FILM COATED ORAL at 01:06

## 2020-06-07 RX ADMIN — LACTULOSE 20 G: 20 SOLUTION ORAL at 12:06

## 2020-06-07 RX ADMIN — LACTULOSE 20 G: 20 SOLUTION ORAL at 09:06

## 2020-06-07 RX ADMIN — IOHEXOL 30 ML: 350 INJECTION, SOLUTION INTRAVENOUS at 09:06

## 2020-06-07 RX ADMIN — HEPARIN SODIUM 5000 UNITS: 5000 INJECTION INTRAVENOUS; SUBCUTANEOUS at 05:06

## 2020-06-07 RX ADMIN — AMITRIPTYLINE HYDROCHLORIDE 25 MG: 25 TABLET, FILM COATED ORAL at 09:06

## 2020-06-07 RX ADMIN — RIFAXIMIN 550 MG: 550 TABLET ORAL at 09:06

## 2020-06-07 RX ADMIN — LACTULOSE 20 G: 20 SOLUTION ORAL at 05:06

## 2020-06-07 RX ADMIN — LACTULOSE 20 G: 20 SOLUTION ORAL at 08:06

## 2020-06-07 RX ADMIN — PANTOPRAZOLE SODIUM 40 MG: 40 TABLET, DELAYED RELEASE ORAL at 12:06

## 2020-06-07 RX ADMIN — LACTULOSE 200 G: 10 SOLUTION ORAL; RECTAL at 04:06

## 2020-06-07 RX ADMIN — CEFTRIAXONE 2 G: 2 INJECTION, SOLUTION INTRAVENOUS at 07:06

## 2020-06-07 RX ADMIN — LACTULOSE 200 G: 10 SOLUTION ORAL; RECTAL at 12:06

## 2020-06-07 RX ADMIN — SODIUM CHLORIDE: 0.9 INJECTION, SOLUTION INTRAVENOUS at 02:06

## 2020-06-07 RX ADMIN — VANCOMYCIN HYDROCHLORIDE 1000 MG: 1 INJECTION, POWDER, LYOPHILIZED, FOR SOLUTION INTRAVENOUS at 09:06

## 2020-06-07 RX ADMIN — HEPARIN SODIUM 5000 UNITS: 5000 INJECTION INTRAVENOUS; SUBCUTANEOUS at 01:06

## 2020-06-07 NOTE — ASSESSMENT & PLAN NOTE
-Continue Lactulose enemas and transition to oral once patient able to safely tolerate.   -Neuro checks.

## 2020-06-07 NOTE — ASSESSMENT & PLAN NOTE
-Monitor MELD.   -Continue lactulose for treatment of hepatic encephalopathy.   -Resume Rifaxaimin for hepatic encephalopathy prophylaxis and Cipro weekly for SBP prophylaxis when patient is able to tolerate oral intake.   -Outpatient GI follow up.

## 2020-06-07 NOTE — ASSESSMENT & PLAN NOTE
-Likely due to decreased intake with associated intravascular volume depletion.   -Gentle hydration and monitor.

## 2020-06-07 NOTE — ED PROVIDER NOTES
SCRIBE #1 NOTE: I, Alicia Brownlee, am scribing for, and in the presence of, Bentley Hoffmann MD. I have scribed the entire note.       History     Chief Complaint   Patient presents with    Altered Mental Status     Review of patient's allergies indicates:  No Known Allergies      History of Present Illness     HPI    6/6/2020, 10:01 PM  History obtained from [facility]  HPI/ROS is limited secondary to AMS      History of Present Illness: Jossy Siegel is a 59 y.o. female patient with a PMHx of KJ, alcoholic cirrhosis, anemia, pancreatitis, encephalopathy who presents to the Emergency Department for evaluation of AMS.     Arrival mode: [facility]    PCP: Primary Doctor No        Past Medical History:  Past Medical History:   Diagnosis Date    Alcoholic cirrhosis of liver with ascites     Anemia     Cirrhosis     Hepatic encephalopathy     Hepatitis C     Pancreatitis     Pancytopenia 2/5/2020    Last Assessment & Plan:  History & Physical Chronic.  Stable.  Follow-up repeat CBC and INR in a.m.  Discharge Summary  Chronic.  Stable. Likely secondary to cirrhosis. Follow-up  Chronic.  Stable. Likely secondary to cirrhosis.    Renal disorder     Thrombocytopenia        Past Surgical History:  Past Surgical History:   Procedure Laterality Date    COLONOSCOPY      ESOPHAGOGASTRODUODENOSCOPY W/ BANDING      Metal plate in pelvis      PARACENTESIS           Family History:  History reviewed. No pertinent family history.    Social History:  Social History     Tobacco Use    Smoking status: Current Every Day Smoker     Packs/day: 0.25     Years: 45.00     Pack years: 11.25     Types: Cigarettes    Smokeless tobacco: Never Used   Substance and Sexual Activity    Alcohol use: Not Currently    Drug use: Yes     Types: Cocaine    Sexual activity: Unknown        Review of Systems     Review of Systems   Unable to perform ROS: Mental status change        Physical Exam     Initial Vitals [06/06/20 2119]   BP  Pulse Resp Temp SpO2   (!) 120/55 87 16 97.4 °F (36.3 °C) 99 %      MAP       --          Physical Exam  Nursing Notes and Vital Signs Reviewed.  Constitutional: Patient is in no acute distress. Well-developed and well-nourished.  Head: Atraumatic. Normocephalic.  Eyes: PERRL. EOM intact. Conjunctivae are not pale. No scleral icterus.  ENT: Mucous membranes are moist. Oropharynx is clear and symmetric.    Neck: Supple. Full ROM. No lymphadenopathy.  Cardiovascular: Regular rate. Regular rhythm. No murmurs, rubs, or gallops. Distal pulses are 2+ and symmetric.  Pulmonary/Chest: No respiratory distress. Clear to auscultation bilaterally. No wheezing or rales.  Abdominal: Soft. Abd is distended.  There is no tenderness.  No rebound, guarding, or rigidity. Good bowel sounds.  Musculoskeletal: Moves all extremities. No obvious deformities. No edema. No calf tenderness.  Skin: Warm and dry. Ecchymosis scattered over extremities.   Neurological: Full neuro exam limited due to patient's condition         ED Course   Critical Care  Date/Time: 6/7/2020 12:12 AM  Performed by: Bentley Hoffmann MD  Authorized by: Bentley Hoffmann MD   Direct patient critical care time: 15 minutes  Additional history critical care time: 5 minutes  Ordering / reviewing critical care time: 5 minutes  Documentation critical care time: 5 minutes  Consulting other physicians critical care time: 5 minutes  Consult with family critical care time: 5 minutes  Total critical care time (exclusive of procedural time) : 40 minutes  Critical care time was exclusive of separately billable procedures and treating other patients and teaching time.  Critical care was necessary to treat or prevent imminent or life-threatening deterioration of the following conditions: Acute hepatic encephalopathy.  Critical care was time spent personally by me on the following activities: blood draw for specimens, development of treatment plan with patient or surrogate,  discussions with consultants, interpretation of cardiac output measurements, evaluation of patient's response to treatment, examination of patient, obtaining history from patient or surrogate, ordering and performing treatments and interventions, ordering and review of laboratory studies, ordering and review of radiographic studies, re-evaluation of patient's condition and review of old charts.        ED Vital Signs:  Vitals:    06/06/20 2301 06/06/20 2326 06/06/20 2332 06/06/20 2333   BP: 108/68  (!) 122/56    Pulse: 104 83 87 83   Resp: (!) 24 19  (!) 23   Temp:       TempSrc:       SpO2: 97% 97% 96% 97%   Weight:       Height:        06/06/20 2346 06/06/20 2350 06/07/20 0017 06/07/20 0022   BP: (!) 101/56  (!) 121/59    Pulse: 89 96 103 99   Resp: 17 (!) 27 19 17   Temp:       TempSrc:       SpO2: 97% 97% (!) 93% (!) 94%   Weight:       Height:        06/07/20 0032 06/07/20 0046 06/07/20 0101 06/07/20 0117   BP: 117/64 (!) 113/58 117/62 (!) 109/56   Pulse: 108 (!) 113 104 102   Resp:  11     Temp:       TempSrc:       SpO2: (!) 94% (!) 93% (!) 94% 96%   Weight:       Height:        06/07/20 0132 06/07/20 0146 06/07/20 0221   BP: 123/66 (!) 107/56 (!) 106/59   Pulse: 96 105 109   Resp: 18 (!) 21 20   Temp:   97.5 °F (36.4 °C)   TempSrc:   Axillary   SpO2: 95% 95% 96%   Weight:      Height:          Abnormal Lab Results:  Labs Reviewed   CBC W/ AUTO DIFFERENTIAL - Abnormal; Notable for the following components:       Result Value    WBC 3.52 (*)     RBC 3.19 (*)     Hemoglobin 10.7 (*)     Hematocrit 32.1 (*)     Mean Corpuscular Volume 101 (*)     Mean Corpuscular Hemoglobin 33.5 (*)     RDW 16.7 (*)     Platelets 112 (*)     Immature Granulocytes 1.1 (*)     Lymph # 0.6 (*)     Lymph% 16.2 (*)     Mono% 15.3 (*)     All other components within normal limits   COMPREHENSIVE METABOLIC PANEL - Abnormal; Notable for the following components:    CO2 21 (*)     Creatinine 2.3 (*)     Calcium 7.9 (*)     Albumin 2.4 (*)      Total Bilirubin 1.1 (*)     Alkaline Phosphatase 181 (*)     AST 44 (*)     eGFR if  26 (*)     eGFR if non  22 (*)     All other components within normal limits   AMMONIA - Abnormal; Notable for the following components:    Ammonia 139 (*)     All other components within normal limits   PROTIME-INR - Abnormal; Notable for the following components:    Prothrombin Time 13.5 (*)     INR 1.3 (*)     All other components within normal limits   LACTIC ACID, PLASMA - Abnormal; Notable for the following components:    Lactate (Lactic Acid) 2.5 (*)     All other components within normal limits   CULTURE, BLOOD   CULTURE, BLOOD   TROPONIN I   ALCOHOL,MEDICAL (ETHANOL)   LIPASE   URINALYSIS, REFLEX TO URINE CULTURE    Narrative:     Preferred Collection Type->Urine, Catheterized   SARS-COV-2 RNA AMPLIFICATION, QUAL   DRUG SCREEN PANEL, URINE EMERGENCY   DRUG SCREEN PANEL, URINE EMERGENCY    Narrative:     Preferred Collection Type->Urine, Catheterized   PROTIME-INR   COMPREHENSIVE METABOLIC PANEL        All Lab Results:  Results for orders placed or performed during the hospital encounter of 06/06/20   CBC auto differential   Result Value Ref Range    WBC 3.52 (L) 3.90 - 12.70 K/uL    RBC 3.19 (L) 4.00 - 5.40 M/uL    Hemoglobin 10.7 (L) 12.0 - 16.0 g/dL    Hematocrit 32.1 (L) 37.0 - 48.5 %    Mean Corpuscular Volume 101 (H) 82 - 98 fL    Mean Corpuscular Hemoglobin 33.5 (H) 27.0 - 31.0 pg    Mean Corpuscular Hemoglobin Conc 33.3 32.0 - 36.0 g/dL    RDW 16.7 (H) 11.5 - 14.5 %    Platelets 112 (L) 150 - 350 K/uL    MPV 12.2 9.2 - 12.9 fL    Immature Granulocytes 1.1 (H) 0.0 - 0.5 %    Gran # (ANC) 2.3 1.8 - 7.7 K/uL    Immature Grans (Abs) 0.04 0.00 - 0.04 K/uL    Lymph # 0.6 (L) 1.0 - 4.8 K/uL    Mono # 0.5 0.3 - 1.0 K/uL    Eos # 0.1 0.0 - 0.5 K/uL    Baso # 0.02 0.00 - 0.20 K/uL    nRBC 0 0 /100 WBC    Gran% 64.0 38.0 - 73.0 %    Lymph% 16.2 (L) 18.0 - 48.0 %    Mono% 15.3 (H) 4.0 - 15.0 %     Eosinophil% 2.8 0.0 - 8.0 %    Basophil% 0.6 0.0 - 1.9 %    Differential Method Automated    Comprehensive metabolic panel   Result Value Ref Range    Sodium 140 136 - 145 mmol/L    Potassium 4.3 3.5 - 5.1 mmol/L    Chloride 106 95 - 110 mmol/L    CO2 21 (L) 23 - 29 mmol/L    Glucose 92 70 - 110 mg/dL    BUN, Bld 22 8 - 23 mg/dL    Creatinine 2.3 (H) 0.5 - 1.4 mg/dL    Calcium 7.9 (L) 8.7 - 10.5 mg/dL    Total Protein 7.1 6.0 - 8.4 g/dL    Albumin 2.4 (L) 3.5 - 5.2 g/dL    Total Bilirubin 1.1 (H) 0.1 - 1.0 mg/dL    Alkaline Phosphatase 181 (H) 55 - 135 U/L    AST 44 (H) 10 - 40 U/L    ALT 27 10 - 44 U/L    Anion Gap 13 8 - 16 mmol/L    eGFR if African American 26 (A) >60 mL/min/1.73 m^2    eGFR if non African American 22 (A) >60 mL/min/1.73 m^2   Troponin I   Result Value Ref Range    Troponin I 0.007 0.000 - 0.026 ng/mL   Ammonia   Result Value Ref Range    Ammonia 139 (H) 10 - 50 umol/L   Protime-INR   Result Value Ref Range    Prothrombin Time 13.5 (H) 9.0 - 12.5 sec    INR 1.3 (H) 0.8 - 1.2   Ethanol   Result Value Ref Range    Alcohol, Medical, Serum <10 <10 mg/dL   Lactic acid, plasma   Result Value Ref Range    Lactate (Lactic Acid) 2.5 (H) 0.5 - 2.2 mmol/L   Lipase   Result Value Ref Range    Lipase 38 4 - 60 U/L   Urinalysis, Reflex to Urine Culture Urine, Catheterized   Result Value Ref Range    Specimen UA Urine, Catheterized     Color, UA Yellow Yellow, Straw, Laure    Appearance, UA Clear Clear    pH, UA 6.0 5.0 - 8.0    Specific Gravity, UA 1.010 1.005 - 1.030    Protein, UA Negative Negative    Glucose, UA Negative Negative    Ketones, UA Negative Negative    Bilirubin (UA) Negative Negative    Occult Blood UA Negative Negative    Nitrite, UA Negative Negative    Urobilinogen, UA Negative <2.0 EU/dL    Leukocytes, UA Negative Negative   COVID-19 Rapid Screening   Result Value Ref Range    SARS-CoV-2 RNA, Amplification, Qual Negative Negative   Drug screen panel, emergency   Result Value Ref Range     Benzodiazepines Negative     Methadone metabolites Negative     Cocaine (Metab.) Presumptive Positive     Opiate Scrn, Ur Negative     Barbiturate Screen, Ur Negative     Amphetamine Screen, Ur Presumptive Positive     THC Negative     Phencyclidine Negative     Creatinine, Random Ur 49.3 15.0 - 325.0 mg/dL    Toxicology Information SEE COMMENT          Imaging Results:  Imaging Results          CT Head Without Contrast (Final result)  Result time 06/06/20 23:01:51    Final result by Yonathan Dos Santos MD (06/06/20 23:01:51)                 Impression:      1.  Motion artifact is present on a number of images.  Subtle abnormalities could be overlooked.  Negative for obvious acute intracranial process. Negative for hemorrhage, or skull fracture.  Negative for interval change    2.  Stable mild atrophy, intracranial atherosclerotic disease and small vessel ischemic changes.    3.  Other stable findings as noted above.    All CT scans at this facility are performed  using dose modulation techniques as appropriate to performed exam including the following:  automated exposure control; adjustment of mA and/or kV according to the patients size (this includes techniques or standardized protocols for targeted exams where dose is matched to indication/reason for exam: i.e. extremities or head);  iterative reconstruction technique.      Electronically signed by: Yonathan Dos Santos MD  Date:    06/06/2020  Time:    23:01             Narrative:    EXAMINATION:  CT HEAD WITHOUT CONTRAST    CLINICAL HISTORY:  Confusion/delirium, altered LOC, unexplained;    TECHNIQUE:  Axial images through the brain and posterior fossa were obtained without the use of IV contrast.  Sagittal and coronal reconstructions are provided for review.    COMPARISON:  June 1, 2020    FINDINGS:  Motion artifact is present on a number of images.  Subtle abnormalities could be overlooked.  The ventricles are midline and the CSF spaces are prominent.  The  gray-white matter junction is well preserved. Negative for intracranial vascular abnormalities. Negative for mass, mass effect, cerebral edema, hemorrhage or abnormal fluid collections.  Intracranial atherosclerotic disease.  Small vessel ischemic changes.  Stable basal ganglia calcifications.    The skull and scalp are intact.  Rightward nasal septal deviation.  The   paranasal sinuses, mastoid air cells, middle ears and ear canals are clear. The globes are intact.                                 The EKG was ordered, reviewed, and independently interpreted by the ED provider.  Interpretation time: 2132  Rate: 88 BPM  Rhythm: normal sinus rhythm  Interpretation: RSR' or QR pattern in V1 suggests R ventricular conduction delay. Possible lateral infarct, age undetermined. No STEMI.         The Emergency Provider reviewed the vital signs and test results, which are outlined above.     ED Discussion     12:10 AM: Discussed case with ERICK Good ([facility] Medicine). She agrees with current care and management of pt and accepts admission.   Admitting Service: [facility] medicine   Admitting Physician: Dr. Gomez  Admit to: Med Tele    12:15 AM: Re-evaluated pt. I have discussed test results, shared treatment plan, and the need for admission with patient. PT have no further questions or concerns at this time. Pt is ready for admit.    1:30 AM: Rectal lactulose given and tolerated well.         Medical Decision Making:   Clinical Tests:   Lab Tests: Ordered and Reviewed  Radiological Study: Ordered and Reviewed  Medical Tests: Ordered and Reviewed           ED Medication(s):  Medications   0.9%  NaCl infusion ( Intravenous New Bag 6/7/20 9717)   lactulose 10 gram/15 mL solution (enema) 200 g (200 g Rectal Given 6/7/20 0052)       Current Discharge Medication List                  Scribe Attestation:   Scribe #1: I performed the above scribed service and the documentation accurately describes the services I performed.  I attest to the accuracy of the note.     Attending:   Physician Attestation Statement for Scribe #1: I, Bentley Hoffmann MD, personally performed the services described in this documentation, as scribed by Alicia Brownlee, in my presence, and it is both accurate and complete.           Clinical Impression       ICD-10-CM ICD-9-CM   1. Acute hepatic encephalopathy K72.00 572.2       Disposition:   Disposition: Admitted  Condition: Fair         Bentley Hoffmann MD  06/07/20 0232

## 2020-06-07 NOTE — HOSPITAL COURSE
On 6/6 Patient was admitted to the Medicine unit secondary to Hepatic Encephalopathy and KJ (Cr elevated to 2.3) for which she was given a Lactulose enema in the ER and then transitioned to oral Lactulose once more awake.  Also started on gentle IVFs for KJ.  Ammonia on admit elevated to 139.  Of note, patient has had multiple recent admissions for the same and continues to use Cocaine and Amphetamines, despite numerous conversations regarding the need for cessation.      As of 6/7 Patient is now AAOx3, and conversing appropriately.  Ammonia improved to 38 this morning.  Cr remains elevated at 2.3.  Will plan to continue gentle IVFs overnight and anticipate DC home in AM if renal function improves.  Detailed conversation held again today, for >15 minutes regarding the complete need for illicit drug cessation; patient does not appear interested in stopping at this time.      As of 6/8 Patient noted to have increased abdominal distention yesterday evening for which a KUB and CT were ordered.  CT showed there is fluid throughout the nondistended colon.  The ascending colon appears to be thick walled, similar to the comparison study. There is some fluid in nondistended distal small bowel.  Contrast fills the proximal small bowel.  There are no dilated loops of bowel or free air noted.  Findings most likely represent colitis, gastroenteritis or diarrheal disease.  Negative for acute process otherwise.  Preliminary BC from 6/6 show Viridans strep.  Case d/w ID and patient was started on Rocephin and Vancomycin.  Repeat BC ordered for the AM.  ECHO pending.  To undergo paracentesis today.  Per detailed d/w patient yesterday, she is now a DNR and would like to pursue hospice placement upon DC.  CM following for DC planning.  Case d/w GI today as patient is well known to Dr. Green.  GI agrees with hospice placement after treatment of acute medical issues.  Will await further recs from ID and use cultures to guide ABX  therapy.  Patient remains AAOx3., with resolution of hepatic encephalopathy.    As of 6/9 Patient is s/p paracentesis removal of 2.3 L.  Fluid studies are consistent with SBP, cultures pending.  Currently on Rocephin and vancomycin (started on Sunday).  Will plan to repeat paracentesis on Thursday to check for improvement in counts.  ID consulted given bacteremia.  Repeat BC today pending.      As of 6/10 Patient noted to have marked increase in abdominal distention today.  Repeat paracentesis ordered.  Peritoneal fluid from 06/8 shows NGTD.  Repeat blood cultures on 6/9 show no growth to date.  Patient remains afebrile with no leukocytosis.  Will discuss antibiotic plan with ID in preparation for DC planning.      As of 6/11 Patient has been accepted [facility] the [facility] of [facility] , with plans to DC home with her niece Yolie who is a nurse after paracentesis is complete today.  Case d/w Dr. Victor given strep bacteremia and SBP.  Per ID, will plan to DC home on Omnicef 300 mg p.o. b.i.d. times 10 days.  Patient was also instructed to continue her Cipro weekly afterwards for SBP prophylaxis.  Will repeat fluid studies with paracentesis today to check for improvement.  Patient remains afebrile with no leukocytosis.  After resolution of SBP, patient may be a candidate to have a drain placed, to be managed by Hospice; message sent to Dr. Green to have her office arrange, if indicated.  Case d/w Dr. Nieto.  Patient seen and examined and deemed medically stable to DC home with her niece today to the care of hospice.  Per patient's wishes she is a DNR.  Medications reconciled for DC home.  KCL repleted prior to DC.

## 2020-06-07 NOTE — PLAN OF CARE
"SW met with patient at bedside. Pt reports living in the home of her fiuriel prior to hospitalization. Pt reports that she required assistance with ADLs/IALDs at home. She advised that she ambulates using a cane at home and identified other DME present in the home for her use if needed (WC, walker, rollator, BSC, shower chair). Pt report past care with [facility]  in which she stated that she received nurse visits twice weekly. Pt reports a hx of cocaine and meth use with last use of meth on yesterday, 6/6/2020. Pt reports using meth approx. once a week, but stated that she has not used cocaine in several years. She report that her substance abuse hx began in the 1970's. Pt reports that her fiance also has a hx of substance use/abuse and suspects that he continues to use although the does not actively use in her presence. Pt identified a network of enabler's with her friends and fiance. Pt is not currently open to inpatient rehab at this time but expressed and interest in stopping drug use. She reports that she has had inpatient experience in the past, but stated that it was not successful as she explains that she was not ready for change at that time. Pt denies the the use of alcohol or illicite pill use. Pt reports that she is open to outpatient rehab/tx. SW provided pt with resources options for outpatient substance rehab. Pt inquired about HC hospice and stated that she would like to discuss the possibility of hospice with healthcare provider. She tearfully stated that she is often in pain and discomfort and is "tired of trying." SW explored pt's statement to assess for suicidal ideation. Pt denies suicidal idenations and explained that she "hurts so bad" all the time and just wants to be comfortable. CURTIS briefly educated pt on  hospice philosophy and eligibility explaining that she may not meet hospice criteria at this time. Pt acknowledged that she understood and SW advised that she would have MD discuss hospice " further with her. Possible SNF referral for the pt post d/c from hospital. SW and pt discussed SNF and SW obtained choices for the pt. Pt selected [facility] and [facility] as options. Pt Choice Form completed and signed and placed in pt's chart. Patient denies any post hospital needs or services at this time. Updated white board with 's name and number. Transitional Care Folder, Discharge Planning Begins on Admission pamphlet, [facility] Bedside Delivery pamphlet, Advance Directive information given to patient along with the contact information given. Instructed patient to call with any questions or concerns.    D/C Plan: Possible SNF upon d/c. Pt's choice: [facility], [facility]  PCP: No PCP  Preferred Pharmacy: [facility] ([address], [place] [address])  Discharge transportation: TBD pending d/c disposition  [facility]: Active  Pharmacy Bedside Delivery: Yes     06/07/20 1406   Discharge Assessment   Assessment Type Discharge Planning Assessment   Confirmed/corrected address and phone number on facesheet? Yes   Assessment information obtained from? Patient;Medical Record   Expected Length of Stay (days)   (TBD)   Communicated expected length of stay with patient/caregiver yes   Prior to hospitilization cognitive status: Alert/Oriented   Prior to hospitalization functional status: Needs Assistance   Current cognitive status: Alert/Oriented   Current Functional Status: Needs Assistance   Facility Arrived From: Home   Lives With significant other   Able to Return to Prior Arrangements yes   Is patient able to care for self after discharge? Unable to determine at this time (comments)  (Pt required assistance at home prior to admit. Possible SNF placement for the pt upon d/c.)   Who are your caregiver(s) and their phone number(s)? Derek Gardiner (significant other) [phone]; Flori Correa (daughter) [phone]   Patient's  perception of discharge disposition skilled nursing facility   Readmission Within the Last 30 Days other (see comments)  (readmission d/t non compliance of medication)   Patient currently being followed by outpatient case management? No   Patient currently receives any other outside agency services? No   Equipment Currently Used at Home walker, rolling;rollator;bedside commode;shower chair;cane, straight;wheelchair   Do you have any problems affording any of your prescribed medications? No   Is the patient taking medications as prescribed? no  (Pt reports poor compliance with medication is d/t side effects.)   If no, which medications is patient not taking? Pt reports poor compliance with medication Lactulose   Does the patient have transportation home? Yes   Transportation Anticipated family or friend will provide   Does the patient receive services at the [facility]? No   Discharge Plan A Skilled Nursing Facility   Discharge Plan B Home with family;Other  (Pt would like to explore the option of HC hospice. Pt understands that she may not be hospice appropriate at this time. )   DME Needed Upon Discharge  none   Patient/Family in Agreement with Plan yes   Does the patient have family/friends to help with healtcare needs after discharge? yes   Does the patient have transportation to healthcare appointments? Yes   Readmission Questionnaire   At the time of your discharge, did someone talk to you about what your health problems were? Yes   At the time of discharge, did someone talk to you about what to watch out for regarding worsening of your health problem? Yes   At the time of discharge, did someone talk to you about what to do if you experienced worsening of your health problem? Yes   At the time of discharge, did someone talk to you about which medication to take when you left the hospital and which ones to stop taking? Yes   At the time of discharge, did someone talk to you about when and where to follow up  with a doctor after you left the hospital? Yes   What do you believe caused you to be sick enough to be re-admitted? Re-admit d/t non compliance with medication.    How often do you need to have someone help you when you read instructions, pamphlets, or other written material from your doctor or pharmacy? Often   Do you have problems taking your medications as prescribed? Yes  (Pt reports poor medication compliance d/t side effects of medication.)   Do you have any problems affording any of  your prescribed medications? No   Do you have problems obtaining/receiving your medications? No   Living Arrangements house   Does your caregiver provide all the help you need? Yes   Are you currently feeling confused? No   Are you currently having problems thinking? No   Are you currently having memory problems? No   Have you felt down, depressed, or hopeless? 0   Have you felt little interest or pleasure in doing things? 0   In the last 7 days, my sleep quality was: fair

## 2020-06-07 NOTE — PT/OT/SLP EVAL
Occupational Therapy   Evaluation    Name: Jossy Siegel  MRN: [id number]  Admitting Diagnosis:  Acute hepatic encephalopathy      Recommendations:     Discharge Recommendations: home health OT  Discharge Equipment Recommendations:  none  Barriers to discharge:  Decreased caregiver support    Assessment:     Jossy Siegel is a 59 y.o. female with a medical diagnosis of Acute hepatic encephalopathy.  She presents with IMPAIRED ADLS, FUNCTIONAL MOBILITY, SAFETY AWARENESS AND PROBLEM SOLVING. Performance deficits affecting function: weakness, impaired endurance, gait instability, impaired cognition, impaired functional mobilty, impaired self care skills, impaired balance, decreased coordination, decreased safety awareness, impaired coordination.      Rehab Prognosis: Good; patient would benefit from acute skilled OT services to address these deficits and reach maximum level of function.       Plan:     Patient to be seen 3 x/week to address the above listed problems via self-care/home management, therapeutic activities, therapeutic exercises  · Plan of Care Expires: 06/14/20  · Plan of Care Reviewed with: patient    Subjective     Chief Complaint: WEAKNESS  Patient/Family Comments/goals: RETURN HOME     Occupational Profile:  Living Environment: LIVES WITH BOYFRIEND IN ONE STORY HOUSE WITH NO STEPS   Previous level of function: MOD I - SPV WITH ADLS AND AMBULATION   Roles and Routines: DOES NOT DRIVE  Equipment Used at Home:  walker, rolling, cane, straight, bedside commode, shower chair, grab bar  Assistance upon Discharge: BOYFRIEND    Pain/Comfort:  · Pain Rating 1: 0/10  · Pain Rating Post-Intervention 1: 0/10    Patients cultural, spiritual, Faith conflicts given the current situation:      Objective:     Communicated with: NURSE OG prior to session.  Patient found HOB elevated with bed alarm, peripheral IV upon OT entry to room.    General Precautions: Standard, fall   Orthopedic  Precautions:N/A   Braces: N/A     Occupational Performance:    Bed Mobility:    · Patient completed Rolling/Turning to Left with  supervision  · Patient completed Rolling/Turning to Right with supervision  · Patient completed Scooting/Bridging with supervision  · Patient completed Supine to Sit with supervision  · Patient completed Sit to Supine with supervision    Functional Mobility/Transfers:  · Patient completed Sit <> Stand Transfer with contact guard assistance  with  rolling walker   · Functional Mobility: AMBULATED 2X60 FEET WITH RW CGA    Activities of Daily Living:  · Upper Body Dressing: supervision GOWN    Cognitive/Visual Perceptual:  Cognitive/Psychosocial Skills:     -       Oriented to: Person, Place and Situation   -       Follows Commands/attention:Follows two-step commands  -       Memory: Impaired STM  -       Safety awareness/insight to disability: impaired     Physical Exam:  Balance:    -       FAIR  Postural examination/scapula alignment:    -       Rounded shoulders  Upper Extremity Range of Motion:     -       Right Upper Extremity: WFL  -       Left Upper Extremity: WFL  Upper Extremity Strength:    -       Right Upper Extremity: WFL  -       Left Upper Extremity: WFL    AMPAC 6 Click ADL:  AMPAC Total Score: 19    Treatment & Education:  PT PARTICIPATED IN INITIAL OT EVALUATION TO ASSESS CURRENT LEVEL OF FUNCTION. PT WILL BENEFIT FROM SKILLED OT SERVICES, AS SHE PRESENTS WITH IMPAIRED SAFETY, ADLS, IADLS, AND BALANCE.  Education:    Patient left HOB elevated with all lines intact, call button in reach, bed alarm on and NURSING notified    GOALS:   Multidisciplinary Problems     Occupational Therapy Goals        Problem: Occupational Therapy Goal    Goal Priority Disciplines Outcome Interventions   Occupational Therapy Goal     OT, PT/OT     Description:  Goals to be met by: 6/14/20     Patient will increase functional independence with ADLs by performing:    UE Dressing with Set-up  Assistance.  LE Dressing with Stand-by Assistance.  Grooming while seated at sink with Supervision.  Toileting from toilet with Supervision for hygiene and clothing management.   Toilet transfer to toilet with Supervision.  Upper extremity exercise program x10 reps per handout, with supervision.                      History:     Past Medical History:   Diagnosis Date    Alcoholic cirrhosis of liver with ascites     Anemia     Cirrhosis     Hepatic encephalopathy     Hepatitis C     Pancreatitis     Pancytopenia 2/5/2020    Last Assessment & Plan:  History & Physical Chronic.  Stable.  Follow-up repeat CBC and INR in a.m.  Discharge Summary  Chronic.  Stable. Likely secondary to cirrhosis. Follow-up  Chronic.  Stable. Likely secondary to cirrhosis.    Renal disorder     Thrombocytopenia        Past Surgical History:   Procedure Laterality Date    COLONOSCOPY      ESOPHAGOGASTRODUODENOSCOPY W/ BANDING      Metal plate in pelvis      PARACENTESIS         Time Tracking:     OT Date of Treatment: 06/07/20  OT Start Time: 0805  OT Stop Time: 0820  OT Total Time (min): 15 min    Billable Minutes:Evaluation 15    Jerilyn Peterson OT  6/7/2020

## 2020-06-07 NOTE — PLAN OF CARE
Problem: Adult Inpatient Plan of Care  Goal: Plan of Care Review  Outcome: Ongoing, Progressing     POC reviewed, including indications and possible side effects of administered medications. Patient verbalized understanding and teach back. No adverse reactions noted. Patient c/o abdominal pain. Administered medications per order. Multiple BM's noted. Denies n/v. ST on heart monitor. VSS. NADN. Patient remains free of falls and injuries during shift. Telesitter at bedside. Will continue to monitor.    Chart check complete.

## 2020-06-07 NOTE — NURSING
"During shift pt. Complains of urinary urgency but inability to void. MD made aware and orders to straight cath to be done. Before procedure pt. Request to be toileted and was able to void small amount before procedure. Pt. Still complains of feeling "full" so straight cath commenced as ordered. No return achieved during catheterization. MD made aware. Will continue to monitor.   "

## 2020-06-07 NOTE — SIGNIFICANT EVENT
On 6/6 Patient was admitted to the Medicine unit secondary to Hepatic Encephalopathy and KJ (Cr elevated to 2.3) for which she was given a Lactulose enema in the ER and then transitioned to oral Lactulose once more awake.  Also started on gentle IVFs for KJ.  Ammonia on admit elevated to 139.  Of note, patient has had multiple recent admissions for the same and continues to use Cocaine and Amphetamines, despite numerous conversations regarding the need for cessation.      As of 6/7 Patient is now AAOx3, and conversing appropriately.  Ammonia improved to 38 this morning.  Cr remains elevated at 2.3.  Will plan to continue gentle IVFs overnight and anticipate DC home in AM if renal function improves.  VS remain stable.  Detailed conversation held again today, for >15 minutes regarding the complete need for illicit drug cessation; patient does not appear interested in stopping at this time.  Agree with continuing current POC.

## 2020-06-07 NOTE — HPI
Jossy Siegel is a 59 year old female with alcoholic cirrhosis with ascites, hepatitis C and ongoing substance abuse who presented to the ED with reports of AMS. The patient was hospitalized from 6/1/20 to 6/2/20 with similar complaints. She was treated for hepatic encephalopathy. On 6/5/20, she had an outpatient paracentesis on with removal of 3.5 liters of fluid. The patient is unable to provide any additional history at this time. Labs in the ED were significant for an ammonia level of 129, WBC count of 3,520, hemoglobin of 10.7, platelet count of 112,000, creatinine of 2.3 which is elevated from her baseline of 1, albumin of 2.4, total bilirubin of 1.1 and INR of 1.3. Urine drug screen was positive for cocaine and amphetamines.

## 2020-06-07 NOTE — PLAN OF CARE
Patient received laying in bed awake, x3, in no acute distress. Vitals present within stable limits. Medications tolerated well. Pt. Ambulated in room with nurse/PT to bathroom first void post-cath removal done on shift. Safety precautions maintained. Will continue to monitor progress.

## 2020-06-07 NOTE — SUBJECTIVE & OBJECTIVE
Past Medical History:   Diagnosis Date    Alcoholic cirrhosis of liver with ascites     Anemia     Cirrhosis     Hepatic encephalopathy     Hepatitis C     Pancreatitis     Pancytopenia 2/5/2020    Last Assessment & Plan:  History & Physical Chronic.  Stable.  Follow-up repeat CBC and INR in a.m.  Discharge Summary  Chronic.  Stable. Likely secondary to cirrhosis. Follow-up  Chronic.  Stable. Likely secondary to cirrhosis.    Renal disorder     Thrombocytopenia        Past Surgical History:   Procedure Laterality Date    COLONOSCOPY      ESOPHAGOGASTRODUODENOSCOPY W/ BANDING      Metal plate in pelvis      PARACENTESIS         Review of patient's allergies indicates:  No Known Allergies    No current facility-administered medications on file prior to encounter.      Current Outpatient Medications on File Prior to Encounter   Medication Sig    albuterol (PROVENTIL/VENTOLIN HFA) 90 mcg/actuation inhaler Inhale 2 puffs into the lungs every 6 (six) hours.    amitriptyline (ELAVIL) 25 MG tablet Take 25 mg by mouth every evening.    ciprofloxacin HCl (CIPRO) 500 MG tablet Take 1 tablet (500 mg total) by mouth once a week.    cyclobenzaprine (FLEXERIL) 10 MG tablet TAKE 1 TABLET BY MOUTH 3 (THREE) TIMES DAILY AS NEEDED FOR MUSCLE SPASMS (PAIN) FOR UP TO 10 DAYS.    furosemide (LASIX) 40 MG tablet Take 1 tablet (40 mg total) by mouth 2 (two) times daily.    lactulose 10 gram/15 ml (CHRONULAC) 10 gram/15 mL (15 mL) solution Take 15 mLs (10 g total) by mouth 3 (three) times daily.    metoprolol tartrate (LOPRESSOR) 25 MG tablet Take 1 tablet (25 mg total) by mouth 2 (two) times daily.    ondansetron (ZOFRAN) 4 MG tablet TAKE 1 TABLET BY MOUTH EVERY 8 (EIGHT) HOURS AS NEEDED FOR NAUSEA FORUP TO 7 DAYS.    ondansetron (ZOFRAN-ODT) 4 MG TbDL Take 1 tablet (4 mg total) by mouth every 8 (eight) hours as needed (nausea).    pantoprazole (PROTONIX) 40 MG tablet Take 1 tablet (40 mg total) by mouth 2 (two)  times daily.    rifAXImin (XIFAXAN) 200 mg Tab TAKE 1 TABLET BY MOUTH 2 (TWO) TIMES DAILY BEFORE MEALS FOR 14 DAYS.    spironolactone (ALDACTONE) 100 MG tablet Take 1 tablet (100 mg total) by mouth 2 (two) times daily.     Family History     None        Tobacco Use    Smoking status: Current Every Day Smoker     Packs/day: 0.25     Years: 45.00     Pack years: 11.25     Types: Cigarettes    Smokeless tobacco: Never Used   Substance and Sexual Activity    Alcohol use: Not Currently    Drug use: Yes     Types: Cocaine    Sexual activity: Not on file     Review of Systems   Unable to perform ROS: Mental status change     Objective:     Vital Signs (Most Recent):  Temp: 97.5 °F (36.4 °C) (06/07/20 0221)  Pulse: 109 (06/07/20 0221)  Resp: 20 (06/07/20 0221)  BP: (!) 106/59 (06/07/20 0221)  SpO2: 96 % (06/07/20 0221) Vital Signs (24h Range):  Temp:  [97.4 °F (36.3 °C)-97.5 °F (36.4 °C)] 97.5 °F (36.4 °C)  Pulse:  [] 109  Resp:  [11-28] 20  SpO2:  [93 %-99 %] 96 %  BP: (101-128)/(55-68) 106/59     Weight: 59 kg (130 lb)  Body mass index is 23.78 kg/m².    Physical Exam   Constitutional: She appears well-developed and well-nourished. She appears lethargic. No distress.   HENT:   Head: Normocephalic and atraumatic.   Eyes: Conjunctivae are normal.   PERRL   Neck: Neck supple. No JVD present.   Cardiovascular: Normal rate, regular rhythm and normal heart sounds.   Pulmonary/Chest: Effort normal and breath sounds normal.   Abdominal: Soft. Bowel sounds are normal. She exhibits distension (moderate). There is no tenderness.   Musculoskeletal: Normal range of motion.   Neurological: She appears lethargic. She is disoriented.   Follows some simple commands.   Skin: Skin is warm and dry.   Blanchable redness to sacrum.    Psychiatric: Her affect is blunt. Her speech is delayed. She is slowed. Cognition and memory are impaired.   Nursing note and vitals reviewed.          Significant Labs:   CBC:   Recent Labs   Lab  06/06/20  2207   WBC 3.52*   HGB 10.7*   HCT 32.1*   *     CMP:   Recent Labs   Lab 06/06/20  2207      K 4.3      CO2 21*   GLU 92   BUN 22   CREATININE 2.3*   CALCIUM 7.9*   PROT 7.1   ALBUMIN 2.4*   BILITOT 1.1*   ALKPHOS 181*   AST 44*   ALT 27   ANIONGAP 13   EGFRNONAA 22*     MELD-Na score: 18 at 6/6/2020 10:07 PM  MELD score: 18 at 6/6/2020 10:07 PM  Calculated from:  Serum Creatinine: 2.3 mg/dL at 6/6/2020 10:07 PM  Serum Sodium: 140 mmol/L (Rounded to 137 mmol/L) at 6/6/2020 10:07 PM  Total Bilirubin: 1.1 mg/dL at 6/6/2020 10:07 PM  INR(ratio): 1.3 at 6/6/2020 10:07 PM  Age: 59 years      All pertinent labs within the past 24 hours have been reviewed.    Significant Imaging: I have reviewed all pertinent imaging results/findings within the past 24 hours.

## 2020-06-07 NOTE — SIGNIFICANT EVENT
"Notified by primary nurse that micro called with gram positive cocci in chains resembling strep in BC drawn on admit.  Detailed discussion held with patient regarding her illicit drug history, and patient adamantly denies IV drug use stating "I only smoke or snort it".  She admits to IVDA in the 1970s, but none since.  She remains afebrile with no leukocytosis.  Patient noted to now have marked abdominal distention with c/o increasing abdominal pain.  Bowel sounds are normo-active.  She also c/o generalized pain "all over".  Patient reports abdominal pain "normally increases in the evening" along with abdominal distention.  No spinal tenderness elicited on exam.  STAT flat and erect xray ordered in addition to repeat CMP.  Will stop IVFs now.  Paracentesis ordered for in the AM.  Start on empiric Rocephin for now and de-escalate pending culture results.        Detailed discussion held with the patient earlier today as she requested Hospice care because she is "tired of hurting all the time".  Social work also met with patient who expressed interest in Hospice Care.  Will consult hospice in the morning to determine if patient is appropriate given h/o ongoing illicit drug use.  Patient is AAOx 3 and able to make her own decisions.  Detailed discussion was held with the patient regarding code status with charge nurse Rachael present in the room.  Per patient's wishes she is now a DNR and does not wish to have any life saving interventions.  She is ok with undergoing therapeutic paracentesis tomorrow and continuing supportive care for now, but she does not want any life saving measures.  Patient gave permission for me to update her daughter Flori, will call now.    "

## 2020-06-07 NOTE — ED NOTES
The patient is asleep. Responds to voices by opening eyes and incomprehensible speech. Patient is now calm as compared to prior patient was combative and confused. Airway is open and patent with, respirations are labored with abdominal muscle use. Skin warm and dry, moves all extremities well. Bed placed in low position, side rails up x 2, call light is within reach of patient. Will continue to monitor.

## 2020-06-07 NOTE — H&P
[facility] Hospital Medicine  History & Physical    Patient Name: Jossy Siegel  MRN: [id number]  Admission Date: 6/6/2020  Attending Physician: Jorje Gomez MD   Primary Care Provider: Primary Doctor No         Patient information was obtained from patient, past medical records and ER records.     Subjective:     Principal Problem:Acute hepatic encephalopathy    Chief Complaint:   Chief Complaint   Patient presents with    Altered Mental Status        HPI: Jossy Siegel is a 59 year old female with alcoholic cirrhosis with ascites, hepatitis C and ongoing substance abuse who presented to the ED with reports of AMS. The patient was hospitalized from 6/1/20 to 6/2/20 with similar complaints. She was treated for hepatic encephalopathy. On 6/5/20, she had an outpatient paracentesis on with removal of 3.5 liters of fluid. The patient is unable to provide any additional history at this time. Labs in the ED were significant for an ammonia level of 129, WBC count of 3,520, hemoglobin of 10.7, platelet count of 112,000, creatinine of 2.3 which is elevated from her baseline of 1, albumin of 2.4, total bilirubin of 1.1 and INR of 1.3. Urine drug screen was positive for cocaine and amphetamines.     Past Medical History:   Diagnosis Date    Alcoholic cirrhosis of liver with ascites     Anemia     Cirrhosis     Hepatic encephalopathy     Hepatitis C     Pancreatitis     Pancytopenia 2/5/2020    Last Assessment & Plan:  History & Physical Chronic.  Stable.  Follow-up repeat CBC and INR in a.m.  Discharge Summary  Chronic.  Stable. Likely secondary to cirrhosis. Follow-up  Chronic.  Stable. Likely secondary to cirrhosis.    Renal disorder     Thrombocytopenia        Past Surgical History:   Procedure Laterality Date    COLONOSCOPY      ESOPHAGOGASTRODUODENOSCOPY W/ BANDING      Metal plate in pelvis      PARACENTESIS         Review of patient's allergies indicates:  No Known  Allergies    No current facility-administered medications on file prior to encounter.      Current Outpatient Medications on File Prior to Encounter   Medication Sig    albuterol (PROVENTIL/VENTOLIN HFA) 90 mcg/actuation inhaler Inhale 2 puffs into the lungs every 6 (six) hours.    amitriptyline (ELAVIL) 25 MG tablet Take 25 mg by mouth every evening.    ciprofloxacin HCl (CIPRO) 500 MG tablet Take 1 tablet (500 mg total) by mouth once a week.    cyclobenzaprine (FLEXERIL) 10 MG tablet TAKE 1 TABLET BY MOUTH 3 (THREE) TIMES DAILY AS NEEDED FOR MUSCLE SPASMS (PAIN) FOR UP TO 10 DAYS.    furosemide (LASIX) 40 MG tablet Take 1 tablet (40 mg total) by mouth 2 (two) times daily.    lactulose 10 gram/15 ml (CHRONULAC) 10 gram/15 mL (15 mL) solution Take 15 mLs (10 g total) by mouth 3 (three) times daily.    metoprolol tartrate (LOPRESSOR) 25 MG tablet Take 1 tablet (25 mg total) by mouth 2 (two) times daily.    ondansetron (ZOFRAN) 4 MG tablet TAKE 1 TABLET BY MOUTH EVERY 8 (EIGHT) HOURS AS NEEDED FOR NAUSEA FORUP TO 7 DAYS.    ondansetron (ZOFRAN-ODT) 4 MG TbDL Take 1 tablet (4 mg total) by mouth every 8 (eight) hours as needed (nausea).    pantoprazole (PROTONIX) 40 MG tablet Take 1 tablet (40 mg total) by mouth 2 (two) times daily.    rifAXImin (XIFAXAN) 200 mg Tab TAKE 1 TABLET BY MOUTH 2 (TWO) TIMES DAILY BEFORE MEALS FOR 14 DAYS.    spironolactone (ALDACTONE) 100 MG tablet Take 1 tablet (100 mg total) by mouth 2 (two) times daily.     Family History     None        Tobacco Use    Smoking status: Current Every Day Smoker     Packs/day: 0.25     Years: 45.00     Pack years: 11.25     Types: Cigarettes    Smokeless tobacco: Never Used   Substance and Sexual Activity    Alcohol use: Not Currently    Drug use: Yes     Types: Cocaine    Sexual activity: Not on file     Review of Systems   Unable to perform ROS: Mental status change     Objective:     Vital Signs (Most Recent):  Temp: 97.5 °F (36.4 °C)  (06/07/20 0221)  Pulse: 109 (06/07/20 0221)  Resp: 20 (06/07/20 0221)  BP: (!) 106/59 (06/07/20 0221)  SpO2: 96 % (06/07/20 0221) Vital Signs (24h Range):  Temp:  [97.4 °F (36.3 °C)-97.5 °F (36.4 °C)] 97.5 °F (36.4 °C)  Pulse:  [] 109  Resp:  [11-28] 20  SpO2:  [93 %-99 %] 96 %  BP: (101-128)/(55-68) 106/59     Weight: 59 kg (130 lb)  Body mass index is 23.78 kg/m².    Physical Exam   Constitutional: She appears well-developed and well-nourished. She appears lethargic. No distress.   HENT:   Head: Normocephalic and atraumatic.   Eyes: Conjunctivae are normal.   PERRL   Neck: Neck supple. No JVD present.   Cardiovascular: Normal rate, regular rhythm and normal heart sounds.   Pulmonary/Chest: Effort normal and breath sounds normal.   Abdominal: Soft. Bowel sounds are normal. She exhibits distension (moderate). There is no tenderness.   Musculoskeletal: Normal range of motion.   Neurological: She appears lethargic. She is disoriented.   Follows some simple commands.   Skin: Skin is warm and dry.   Blanchable redness to sacrum.    Psychiatric: Her affect is blunt. Her speech is delayed. She is slowed. Cognition and memory are impaired.   Nursing note and vitals reviewed.          Significant Labs:   CBC:   Recent Labs   Lab 06/06/20 2207   WBC 3.52*   HGB 10.7*   HCT 32.1*   *     CMP:   Recent Labs   Lab 06/06/20  2207      K 4.3      CO2 21*   GLU 92   BUN 22   CREATININE 2.3*   CALCIUM 7.9*   PROT 7.1   ALBUMIN 2.4*   BILITOT 1.1*   ALKPHOS 181*   AST 44*   ALT 27   ANIONGAP 13   EGFRNONAA 22*     MELD-Na score: 18 at 6/6/2020 10:07 PM  MELD score: 18 at 6/6/2020 10:07 PM  Calculated from:  Serum Creatinine: 2.3 mg/dL at 6/6/2020 10:07 PM  Serum Sodium: 140 mmol/L (Rounded to 137 mmol/L) at 6/6/2020 10:07 PM  Total Bilirubin: 1.1 mg/dL at 6/6/2020 10:07 PM  INR(ratio): 1.3 at 6/6/2020 10:07 PM  Age: 59 years      All pertinent labs within the past 24 hours have been reviewed.    Significant  Imaging: I have reviewed all pertinent imaging results/findings within the past 24 hours.    Assessment/Plan:     * Acute hepatic encephalopathy  -Continue Lactulose enemas and transition to oral once patient able to safely tolerate.   -Neuro checks.       KJ (acute kidney injury)  -Likely due to decreased intake with associated intravascular volume depletion.   -Gentle hydration and monitor.     Pancytopenia  -Appears stable.   -Monitor.       Cirrhosis of liver with ascites related to hepatitis C virus  -Monitor MELD.   -Continue lactulose for treatment of hepatic encephalopathy.   -Resume Rifaxaimin for hepatic encephalopathy prophylaxis and Cipro weekly for SBP prophylaxis when patient is able to tolerate oral intake.   -Outpatient GI follow up.       Polysubstance abuse   patient on cessation, when appropriate.     Tobacco abuse   patient on cessation, when appropriate.       Severe malnutrition  -Check prealbumin.   -Consider Nutrition consult once patient awake and alert.         VTE Risk Mitigation (From admission, onward)         Ordered     heparin (porcine) injection 5,000 Units  Every 8 hours      06/07/20 0301                   ERICK Good  Department of Hospital Medicine   [facility]

## 2020-06-07 NOTE — PT/OT/SLP EVAL
Physical Therapy Evaluation    Patient Name:  Jossy Siegel   MRN:  [id number]    Recommendations:     Discharge Recommendations:  home health PT, home(needs 24hr care for safety)   Discharge Equipment Recommendations: none   Barriers to discharge: unsure of cg support at home    Assessment:     Jossy Siegel is a 59 y.o. female admitted with a medical diagnosis of Acute hepatic encephalopathy.  She presents with the following impairments/functional limitations:  weakness, impaired functional mobilty, decreased safety awareness, gait instability, impaired endurance, impaired balance, impaired self care skills, decreased lower extremity function, impaired cognition.    Rehab Prognosis: Good; patient would benefit from acute skilled PT services to address these deficits and reach maximum level of function.    Recent Surgery: * No surgery found *      Plan:     During this hospitalization, patient to be seen 5 x/week to address the identified rehab impairments via gait training, therapeutic activities, therapeutic exercises and progress toward the following goals:    · Plan of Care Expires:  06/14/20    Subjective     Chief Complaint: min weakness  Patient/Family Comments/goals: to go home; get stronger  Pain/Comfort:  · Pain Rating 1: 0/10    Patients cultural, spiritual, Worship conflicts given the current situation:      Living Environment:  Lives with boyfriend she reports - unsure of accuracy; no steps per pt. report  Prior to admission, patients level of function was mod indep with RW.  Equipment used at home: walker, rolling, cane, straight, bedside commode, grab bar, shower chair.  DME owned (not currently used): none.  Upon discharge, patient will have assistance from unsure of cg support; needs spv for safety with mobility/adl's; rec hhpt and 24hr spv.    Objective:     Communicated with RN prior to session.  Patient found HOB elevated with bed alarm, peripheral IV  upon PT entry to  room.    General Precautions: Standard, fall   Orthopedic Precautions:N/A   Braces: N/A     Exams:  · B LE ROM WFL and strength grossly 4-/5    Functional Mobility:  · Bed Mobility - supine to/from sit cga for safety and cues for efficient log-rolling technique  · Transfers - sit to/from stand with RW and cga for safety/cues for safe hand placement  · Gt - Amb >50ft x 2 RW cga for safety with RW use and cues for erect posture      Therapeutic Activities and Exercises:   PT educated patient on POC, B LE TE to prep mobility/dec stiffness and safety/fall precautions with mobility using RW.    AM-PAC 6 CLICK MOBILITY  Total Score:16     Patient left HOB elevated with all lines intact, call button in reach, bed alarm on, RN notified and avasys present.    GOALS:   Multidisciplinary Problems     Physical Therapy Goals        Problem: Physical Therapy Goal    Goal Priority Disciplines Outcome Goal Variances Interventions   Physical Therapy Goal     PT, PT/OT      Description:  1. Patient will perform supine to/from sit mod indep  2. Patient will perform sit to/from stand with RW mod indep  3. Patient will amb >300ft RW mod indep no gross LOB                    History:     Past Medical History:   Diagnosis Date    Alcoholic cirrhosis of liver with ascites     Anemia     Cirrhosis     Hepatic encephalopathy     Hepatitis C     Pancreatitis     Pancytopenia 2/5/2020    Last Assessment & Plan:  History & Physical Chronic.  Stable.  Follow-up repeat CBC and INR in a.m.  Discharge Summary  Chronic.  Stable. Likely secondary to cirrhosis. Follow-up  Chronic.  Stable. Likely secondary to cirrhosis.    Renal disorder     Thrombocytopenia        Past Surgical History:   Procedure Laterality Date    COLONOSCOPY      ESOPHAGOGASTRODUODENOSCOPY W/ BANDING      Metal plate in pelvis      PARACENTESIS         Time Tracking:     PT Received On: 06/07/20  PT Start Time: 0600     PT Stop Time: 0625  PT Total Time (min): 25 min      Billable Minutes: Evaluation 15 and Therapeutic Activity 10      Florin Morrison, PT  06/07/2020

## 2020-06-07 NOTE — NURSING
Patient c/o abdominal pain. No PRN orders. Patient answering questions appropriately and oriented x 4. House and rectal tube removed. Passed swallow test. Informed S. D'Aquin. New orders noted. NADN. Will continue to monitor.

## 2020-06-08 PROBLEM — R93.5 ABNORMAL CT OF THE ABDOMEN: Status: ACTIVE | Noted: 2020-06-08

## 2020-06-08 PROBLEM — R78.81 BACTEREMIA: Status: ACTIVE | Noted: 2020-06-08

## 2020-06-08 LAB
ALBUMIN SERPL BCP-MCNC: 2 G/DL (ref 3.5–5.2)
ALP SERPL-CCNC: 132 U/L (ref 55–135)
ALT SERPL W/O P-5'-P-CCNC: 22 U/L (ref 10–44)
ANION GAP SERPL CALC-SCNC: 9 MMOL/L (ref 8–16)
AORTIC ROOT ANNULUS: 3.47 CM
APPEARANCE FLD: NORMAL
ASCENDING AORTA: 2.8 CM
AST SERPL-CCNC: 32 U/L (ref 10–40)
AV INDEX (PROSTH): 0.78
AV MEAN GRADIENT: 8 MMHG
AV PEAK GRADIENT: 12 MMHG
AV VALVE AREA: 2.42 CM2
AV VELOCITY RATIO: 0.77
BASOPHILS # BLD AUTO: 0.02 K/UL (ref 0–0.2)
BASOPHILS NFR BLD: 0.4 % (ref 0–1.9)
BILIRUB SERPL-MCNC: 0.8 MG/DL (ref 0.1–1)
BODY FLD TYPE: NORMAL
BSA FOR ECHO PROCEDURE: 1.73 M2
BUN SERPL-MCNC: 31 MG/DL (ref 8–23)
CALCIUM SERPL-MCNC: 7.3 MG/DL (ref 8.7–10.5)
CHLORIDE SERPL-SCNC: 107 MMOL/L (ref 95–110)
CO2 SERPL-SCNC: 21 MMOL/L (ref 23–29)
COLOR FLD: COLORLESS
CREAT SERPL-MCNC: 2 MG/DL (ref 0.5–1.4)
CV ECHO LV RWT: 0.87 CM
DIFFERENTIAL METHOD: ABNORMAL
DOP CALC AO PEAK VEL: 1.73 M/S
DOP CALC AO VTI: 29.38 CM
DOP CALC LVOT AREA: 3.1 CM2
DOP CALC LVOT DIAMETER: 1.99 CM
DOP CALC LVOT PEAK VEL: 1.34 M/S
DOP CALC LVOT STROKE VOLUME: 71.22 CM3
DOP CALCLVOT PEAK VEL VTI: 22.91 CM
E WAVE DECELERATION TIME: 164.46 MSEC
E/A RATIO: 0.72
ECHO LV POSTERIOR WALL: 1.28 CM (ref 0.6–1.1)
EOSINOPHIL # BLD AUTO: 0.1 K/UL (ref 0–0.5)
EOSINOPHIL NFR BLD: 2.9 % (ref 0–8)
ERYTHROCYTE [DISTWIDTH] IN BLOOD BY AUTOMATED COUNT: 17 % (ref 11.5–14.5)
EST. GFR  (AFRICAN AMERICAN): 30 ML/MIN/1.73 M^2
EST. GFR  (NON AFRICAN AMERICAN): 26 ML/MIN/1.73 M^2
FRACTIONAL SHORTENING: 28 % (ref 28–44)
GLUCOSE SERPL-MCNC: 85 MG/DL (ref 70–110)
HCT VFR BLD AUTO: 26.9 % (ref 37–48.5)
HGB BLD-MCNC: 8.4 G/DL (ref 12–16)
IMM GRANULOCYTES # BLD AUTO: 0.06 K/UL (ref 0–0.04)
IMM GRANULOCYTES NFR BLD AUTO: 1.3 % (ref 0–0.5)
INR PPP: 1.3 (ref 0.8–1.2)
INTERVENTRICULAR SEPTUM: 1.37 CM (ref 0.6–1.1)
IVRT: 66.9 MSEC
LA MAJOR: 4.16 CM
LA MINOR: 3.75 CM
LA WIDTH: 2.88 CM
LEFT ATRIUM SIZE: 3.76 CM
LEFT ATRIUM VOLUME INDEX: 20.5 ML/M2
LEFT ATRIUM VOLUME: 36.31 CM3
LEFT INTERNAL DIMENSION IN SYSTOLE: 2.11 CM (ref 2.1–4)
LEFT VENTRICLE DIASTOLIC VOLUME INDEX: 18.93 ML/M2
LEFT VENTRICLE DIASTOLIC VOLUME: 33.59 ML
LEFT VENTRICLE MASS INDEX: 71 G/M2
LEFT VENTRICLE SYSTOLIC VOLUME INDEX: 8.2 ML/M2
LEFT VENTRICLE SYSTOLIC VOLUME: 14.57 ML
LEFT VENTRICULAR INTERNAL DIMENSION IN DIASTOLE: 2.95 CM (ref 3.5–6)
LEFT VENTRICULAR MASS: 125.35 G
LYMPHOCYTES # BLD AUTO: 0.9 K/UL (ref 1–4.8)
LYMPHOCYTES NFR BLD: 18.2 % (ref 18–48)
MAGNESIUM SERPL-MCNC: 1.7 MG/DL (ref 1.6–2.6)
MCH RBC QN AUTO: 32.2 PG (ref 27–31)
MCHC RBC AUTO-ENTMCNC: 31.2 G/DL (ref 32–36)
MCV RBC AUTO: 103 FL (ref 82–98)
MONOCYTES # BLD AUTO: 0.6 K/UL (ref 0.3–1)
MONOCYTES NFR BLD: 13 % (ref 4–15)
MONOS+MACROS NFR FLD MANUAL: 24 %
MV PEAK A VEL: 1.3 M/S
MV PEAK E VEL: 0.94 M/S
NEUTROPHILS # BLD AUTO: 3.1 K/UL (ref 1.8–7.7)
NEUTROPHILS NFR BLD: 64.2 % (ref 38–73)
NEUTROPHILS NFR FLD MANUAL: 76 %
NRBC BLD-RTO: 0 /100 WBC
PHOSPHATE SERPL-MCNC: 4.2 MG/DL (ref 2.7–4.5)
PISA TR MAX VEL: 2.64 M/S
PLATELET # BLD AUTO: 86 K/UL (ref 150–350)
PMV BLD AUTO: 11.9 FL (ref 9.2–12.9)
POTASSIUM SERPL-SCNC: 3.9 MMOL/L (ref 3.5–5.1)
PROT SERPL-MCNC: 6.1 G/DL (ref 6–8.4)
PROTHROMBIN TIME: 13.9 SEC (ref 9–12.5)
RA MAJOR: 3.98 CM
RA WIDTH: 2.04 CM
RBC # BLD AUTO: 2.61 M/UL (ref 4–5.4)
SINUS: 3.23 CM
SODIUM SERPL-SCNC: 137 MMOL/L (ref 136–145)
STJ: 3.01 CM
TR MAX PG: 28 MMHG
TRICUSPID ANNULAR PLANE SYSTOLIC EXCURSION: 2.08 CM
VANCOMYCIN SERPL-MCNC: 6.5 UG/ML
WBC # BLD AUTO: 4.77 K/UL (ref 3.9–12.7)
WBC # FLD: 904 /CU MM

## 2020-06-08 PROCEDURE — 63600175 PHARM REV CODE 636 W HCPCS: Performed by: NURSE PRACTITIONER

## 2020-06-08 PROCEDURE — 84157 ASSAY OF PROTEIN OTHER: CPT

## 2020-06-08 PROCEDURE — 97530 THERAPEUTIC ACTIVITIES: CPT

## 2020-06-08 PROCEDURE — 83615 LACTATE (LD) (LDH) ENZYME: CPT

## 2020-06-08 PROCEDURE — 80202 ASSAY OF VANCOMYCIN: CPT

## 2020-06-08 PROCEDURE — 87205 SMEAR GRAM STAIN: CPT

## 2020-06-08 PROCEDURE — 82042 OTHER SOURCE ALBUMIN QUAN EA: CPT

## 2020-06-08 PROCEDURE — 25000003 PHARM REV CODE 250: Performed by: NURSE PRACTITIONER

## 2020-06-08 PROCEDURE — 25000003 PHARM REV CODE 250: Performed by: PHYSICIAN ASSISTANT

## 2020-06-08 PROCEDURE — 85610 PROTHROMBIN TIME: CPT

## 2020-06-08 PROCEDURE — 99233 PR SUBSEQUENT HOSPITAL CARE,LEVL III: ICD-10-PCS | Mod: ,,, | Performed by: INTERNAL MEDICINE

## 2020-06-08 PROCEDURE — 21400001 HC TELEMETRY ROOM

## 2020-06-08 PROCEDURE — 87070 CULTURE OTHR SPECIMN AEROBIC: CPT

## 2020-06-08 PROCEDURE — 97802 MEDICAL NUTRITION INDIV IN: CPT

## 2020-06-08 PROCEDURE — 63600175 PHARM REV CODE 636 W HCPCS: Performed by: PHYSICIAN ASSISTANT

## 2020-06-08 PROCEDURE — 83735 ASSAY OF MAGNESIUM: CPT

## 2020-06-08 PROCEDURE — 11000001 HC ACUTE MED/SURG PRIVATE ROOM

## 2020-06-08 PROCEDURE — 84100 ASSAY OF PHOSPHORUS: CPT

## 2020-06-08 PROCEDURE — 89051 BODY FLUID CELL COUNT: CPT

## 2020-06-08 PROCEDURE — 80053 COMPREHEN METABOLIC PANEL: CPT

## 2020-06-08 PROCEDURE — 99233 SBSQ HOSP IP/OBS HIGH 50: CPT | Mod: ,,, | Performed by: INTERNAL MEDICINE

## 2020-06-08 PROCEDURE — 85025 COMPLETE CBC W/AUTO DIFF WBC: CPT

## 2020-06-08 PROCEDURE — 87075 CULTR BACTERIA EXCEPT BLOOD: CPT

## 2020-06-08 PROCEDURE — 36415 COLL VENOUS BLD VENIPUNCTURE: CPT

## 2020-06-08 RX ORDER — LACTULOSE 10 G/15ML
15 SOLUTION ORAL 3 TIMES DAILY
Status: DISCONTINUED | OUTPATIENT
Start: 2020-06-08 | End: 2020-06-11 | Stop reason: HOSPADM

## 2020-06-08 RX ADMIN — LACTULOSE 15 G: 20 SOLUTION ORAL at 09:06

## 2020-06-08 RX ADMIN — MORPHINE SULFATE 1 MG: 2 INJECTION, SOLUTION INTRAMUSCULAR; INTRAVENOUS at 12:06

## 2020-06-08 RX ADMIN — METOPROLOL TARTRATE 25 MG: 25 TABLET ORAL at 09:06

## 2020-06-08 RX ADMIN — CEFTRIAXONE 2 G: 2 INJECTION, SOLUTION INTRAVENOUS at 07:06

## 2020-06-08 RX ADMIN — AMITRIPTYLINE HYDROCHLORIDE 25 MG: 25 TABLET, FILM COATED ORAL at 09:06

## 2020-06-08 RX ADMIN — LACTULOSE 20 G: 20 SOLUTION ORAL at 12:06

## 2020-06-08 RX ADMIN — HEPARIN SODIUM 5000 UNITS: 5000 INJECTION INTRAVENOUS; SUBCUTANEOUS at 09:06

## 2020-06-08 RX ADMIN — RIFAXIMIN 550 MG: 550 TABLET ORAL at 09:06

## 2020-06-08 RX ADMIN — HEPARIN SODIUM 5000 UNITS: 5000 INJECTION INTRAVENOUS; SUBCUTANEOUS at 05:06

## 2020-06-08 RX ADMIN — LACTULOSE 15 G: 20 SOLUTION ORAL at 03:06

## 2020-06-08 RX ADMIN — LACTULOSE 20 G: 20 SOLUTION ORAL at 09:06

## 2020-06-08 RX ADMIN — MORPHINE SULFATE 1 MG: 2 INJECTION, SOLUTION INTRAMUSCULAR; INTRAVENOUS at 05:06

## 2020-06-08 RX ADMIN — PANTOPRAZOLE SODIUM 40 MG: 40 TABLET, DELAYED RELEASE ORAL at 09:06

## 2020-06-08 NOTE — NURSING
Fall precautions maintained. Pt free from injuries/fall.  Repositions and ambulates with stand by assist.   C/o pain, PRN meds available.  Pt to have thoracentesis today.  Bed locked and low, side rails up x 2, phone and call light w/in reach.   POC and meds discussed, pt verbalized understanding.   Chart check done. Will cont to monitor.

## 2020-06-08 NOTE — PROGRESS NOTES
Pharmacokinetic Initial Assessment: IV Vancomycin    Assessment/Plan:    Initiate intravenous vancomycin with loading dose of 1000 mg once with subsequent doses when random concentrations are less than 20 mcg/mL  Desired empiric serum trough concentration is 15 to 20 mcg/mL  Draw vancomycin random level on 6/8 at 21:30.  Pharmacy will continue to follow and monitor vancomycin.      Please contact pharmacy at extension [phone] with any questions regarding this assessment.     Thank you for the consult,   Yonathan Jimenes       Patient brief summary:  Jossy Siegel is a 59 y.o. female initiated on antimicrobial therapy with IV Vancomycin for treatment of suspected gram positive cocci in chains resembling strep    Drug Allergies:   Review of patient's allergies indicates:  No Known Allergies    Actual Body Weight:   59kg    Renal Function:   Estimated Creatinine Clearance: 18 mL/min (A) (based on SCr of 2.6 mg/dL (H)).,     Dialysis Method (if applicable):  N/A    CBC (last 72 hours):  Recent Labs   Lab Result Units 06/06/20 2207 06/07/20  0530   WBC K/uL 3.52* 6.33   Hemoglobin g/dL 10.7* 11.4*   Hematocrit % 32.1* 35.7*   Platelets K/uL 112* 121*   Gran% % 64.0 79.2*   Lymph% % 16.2* 9.0*   Mono% % 15.3* 9.5   Eosinophil% % 2.8 1.1   Basophil% % 0.6 0.6   Differential Method  Automated Automated       Metabolic Panel (last 72 hours):  Recent Labs   Lab Result Units 06/06/20 2207 06/06/20  2226 06/07/20  0530 06/07/20  1814   Sodium mmol/L 140  --  143 139   Potassium mmol/L 4.3  --  5.2* 4.3   Chloride mmol/L 106  --  109 108   CO2 mmol/L 21*  --  24 19*   Glucose mg/dL 92  --  93 149*   Glucose, UA   --  Negative  --   --    BUN, Bld mg/dL 22  --  27* 31*   Creatinine mg/dL 2.3*  --  2.3* 2.6*   Creatinine, Random Ur mg/dL  --  49.3  --   --    Albumin g/dL 2.4*  --  2.3* 2.2*   Total Bilirubin mg/dL 1.1*  --  2.5* 1.3*   Alkaline Phosphatase U/L 181*  --  161* 149*   AST U/L 44*  --  45* 39   ALT U/L 27   --  28 24   Magnesium mg/dL  --   --  1.8  --    Phosphorus mg/dL  --   --  5.1*  --        Drug levels (last 3 results):  No results for input(s): VANCOMYCINRA, VANCOMYCINPE, VANCOMYCINTR in the last 72 hours.    Microbiologic Results:  Microbiology Results (last 7 days)       Procedure Component Value Units Date/Time    Blood culture #1 **CANNOT BE ORDERED STAT** [[id number]] Collected:  06/06/20 2220    Order Status:  Completed Specimen:  Blood from Peripheral, Hand, Right Updated:  06/07/20 1856     Blood Culture, Routine Gram stain sylwia bottle: Gram positive cocci in chains resembling Strep       Results called to and read back by:Renetta Paredes RN 06/07/2020  17:47      Gram stain aer bottle: Gram positive cocci in chains resembling Strep       06/07/2020  18:55    Blood culture #2 **CANNOT BE ORDERED STAT** [[id number]] Collected:  06/06/20 2236    Order Status:  Completed Specimen:  Blood from Peripheral, Hand, Right Updated:  06/07/20 1145     Blood Culture, Routine No Growth to date

## 2020-06-08 NOTE — ASSESSMENT & PLAN NOTE
- Now resolved  - Continue Lactulose TID   - Continue Rifaximin  - Neuro checks q 4 hours  - Monitor

## 2020-06-08 NOTE — ASSESSMENT & PLAN NOTE
- CT abdomen showed findings most likely represent colitis, gastroenteritis or diarrheal disease.  Negative for acute process otherwise  - Remains afebrile with no leukocytosis  - Denies N/V  - On Lactulose, so patient with frequent stools  - Stool Cx, ova/parasites pending  - Continue Rocephin and Vancomycin  - Will use cultures to guide ABX therapy  - Monitor

## 2020-06-08 NOTE — PT/OT/SLP PROGRESS
Physical Therapy  Treatment    Jossy Siegel   MRN: [id number]   Admitting Diagnosis: Acute hepatic encephalopathy    PT Received On: 06/08/20  PT Start Time: 0832     PT Stop Time: 0848    PT Total Time (min): 16 min       Billable Minutes:  Therapeutic Activity 16 min    Treatment Type: Treatment  PT/PTA: PT     PTA Visit Number: 0       General Precautions: Standard, fall  Orthopedic Precautions: N/A   Braces: N/A    Spiritual, Cultural Beliefs, Yarsani Practices, Values that Affect Care: no    Subjective:  Communicated with Ileana and nurse Houser prior to session.  Patient up in bathroom upon therapist's arrival.    Pain/Comfort  Pain Rating 1: 0/10    Objective:   Patient found with: peripheral IV    Functional Mobility:  Bed Mobility:    Patient required SBA for all bed mobility.    Transfers:   Sit <-> stand from toilet and bed with SBA     Gait:    Patient ambulated in room and 60 feet x 2 without AD with SBA for safety.  Slow ashley, slightly flexed posture.    Balance:   Static Sit: GOOD: Takes MODERATE challenges from all directions  Dynamic Sit: GOOD: Maintains balance through MODERATE excursions of active trunk movement  Static Stand: FAIR+: Takes MINIMAL challenges from all directions  Dynamic stand: FAIR+: Needs CLOSE SUPERVISION during gait and is able to right self with minor LOB     Therapeutic Activities and Exercises:  Patient participated in transfers and gait training (see above for details).  While demonstrating good sitting balance, patient performed seated BLE therapeutic exercises 10 x 2 in all planes.  Patient declined to sit up in chair after session and insisted on getting back into bed to rest.     AM-PAC 6 CLICK MOBILITY  How much help from another person does this patient currently need?   1 = Unable, Total/Dependent Assistance  2 = A lot, Maximum/Moderate Assistance  3 = A little, Minimum/Contact Guard/Supervision  4 = None, Modified Stitzer/Independent    Turning over  in bed (including adjusting bedclothes, sheets and blankets)?: 4  Sitting down on and standing up from a chair with arms (e.g., wheelchair, bedside commode, etc.): 4  Moving from lying on back to sitting on the side of the bed?: 4  Moving to and from a bed to a chair (including a wheelchair)?: 3  Need to walk in hospital room?: 3  Climbing 3-5 steps with a railing?: 1  Basic Mobility Total Score: 19    AM-PAC Raw Score CMS G-Code Modifier Level of Impairment Assistance   6 % Total / Unable   7 - 9 CM 80 - 100% Maximal Assist   10 - 14 CL 60 - 80% Moderate Assist   15 - 19 CK 40 - 60% Moderate Assist   20 - 22 CJ 20 - 40% Minimal Assist   23 CI 1-20% SBA / CGA   24 CH 0% Independent/ Mod I     Patient left HOB elevated with all lines intact, call button in reach and bed alarm on.    Assessment:  Patient tolerated increased OOB activity well today.  Required less assistance with transfers and gait. Would benefit from continued skilled PT services as per POC.    Rehab identified problem list/impairments: Rehab identified problem list/impairments: weakness, impaired functional mobilty, decreased safety awareness, gait instability, impaired balance, impaired endurance, impaired self care skills, decreased lower extremity function    Rehab potential is good.    Activity tolerance: Good    Discharge recommendations: Discharge Facility/Level of Care Needs: home health PT, home(with 24 care/supervision for safety)     Barriers to discharge:      Equipment recommendations: Equipment Needed After Discharge: none     GOALS:   Multidisciplinary Problems     Physical Therapy Goals        Problem: Physical Therapy Goal    Goal Priority Disciplines Outcome Goal Variances Interventions   Physical Therapy Goal     PT, PT/OT Ongoing, Progressing     Description:  1. Patient will perform supine to/from sit mod indep  2. Patient will perform sit to/from stand with RW mod indep  3. Patient will amb >300ft RW mod indep no gross  LOB                    PLAN:    Patient to be seen 5 x/week  to address the above listed problems via gait training, therapeutic activities, therapeutic exercises  Plan of Care expires: 06/14/20  Plan of Care reviewed with: patient         Elisabeth Thomas, PT, DPT  06/08/2020

## 2020-06-08 NOTE — ASSESSMENT & PLAN NOTE
- Counseled extensively on the need for complete illicit drug cessation, patient verbalizes understanding and expresses desire to quit, however she has had multiple admissions over the past several months due to noncompliance with home medications and continued ongoing illicit drug use

## 2020-06-08 NOTE — PLAN OF CARE
Fall precautions implemented. Pt remained free from falls/injuries. AVASYS at bedside.  IV abx given per orders. AAO x4. Turning, coughing and deep breathing encouraged. NSR on monitor. Blanchable redness noted to sacrum, pt encouraged to turn while in bed. Completed CT of abd and pelvis during shift, no obstruction noted so no need for NGT per William SUAREZ. No c/o of nausea or vomiting during shift. Pt had too moderate loose Bm's. Safety precautions implemented. Chart reviewed. Will continue to monitor.

## 2020-06-08 NOTE — PLAN OF CARE
Recommendations     Recommendation:  1. Continue Cardiac Diet. Consider fluid restriction if UOP does not improve.   2. Order Boost Van. BID (breakfast and lunch) to support energy and protein goals.   3. Consider multivitamin and folate.   4. Weekly weights.      Goals: 75% of EEN and EPN at RD f/u  Nutrition Goal Status: new  Communication of RD Recs: Yvon VELEZ, RD, LDN

## 2020-06-08 NOTE — ASSESSMENT & PLAN NOTE
- patient has ongoing illicit drug use including methamphetamines and cocaine  - Denies IVDA  - Counselled

## 2020-06-08 NOTE — SUBJECTIVE & OBJECTIVE
Interval History:  No acute events overnight.  Patient currently resting comfortably in bed in no acute distress.  Patient noted to have increased abdominal distention yesterday evening for which a KUB and CT were ordered.  CT showed there is fluid throughout the nondistended colon.  The ascending colon appears to be thick walled, similar to the comparison study. There is some fluid in nondistended distal small bowel.  Contrast fills the proximal small bowel.  There are no dilated loops of bowel or free air noted.  Findings most likely represent colitis, gastroenteritis or diarrheal disease.  Negative for acute process otherwise.  Preliminary BC from 6/6 show Viridans strep.  Case d/w ID and patient was started on Rocephin and Vancomycin.  Repeat BC ordered for the AM.  ECHO pending.  To undergo paracentesis today.  Per detailed d/w patient yesterday, she is now a DNR and would like to pursue hospice placement upon DC.  CM following for DC planning.  Case d/w GI today as patient is well known to Dr. Green.  GI agrees with hospice placement after treatment of acute medical issues.  Will await further recs from ID and use cultures to guide ABX therapy.  Patient remains AAOx3., with resolution of hepatic encephalopathy.    Review of Systems   Constitutional: Positive for fatigue. Negative for chills, diaphoresis and fever.   HENT: Negative for facial swelling, hearing loss, mouth sores, sneezing, sore throat, tinnitus and trouble swallowing.    Eyes: Negative for photophobia, pain, discharge, redness and visual disturbance.   Respiratory: Negative for apnea, cough, choking, chest tightness, shortness of breath, wheezing and stridor.    Cardiovascular: Negative for chest pain, palpitations and leg swelling.   Gastrointestinal: Positive for abdominal distention and abdominal pain. Negative for anal bleeding, blood in stool, constipation, diarrhea, nausea, rectal pain and vomiting.   Endocrine: Negative for cold  "intolerance, heat intolerance, polydipsia, polyphagia and polyuria.   Genitourinary: Negative for difficulty urinating, dysuria, flank pain, frequency, hematuria, pelvic pain, urgency, vaginal bleeding, vaginal discharge and vaginal pain.   Musculoskeletal: Positive for arthralgias, back pain and myalgias. Negative for gait problem and neck stiffness.        C/o pain "all over".   Skin: Negative for pallor, rash and wound.   Allergic/Immunologic: Negative for food allergies.   Neurological: Positive for weakness. Negative for dizziness, tremors, seizures, syncope, speech difficulty and headaches.   Hematological: Negative for adenopathy. Does not bruise/bleed easily.   Psychiatric/Behavioral: Negative for behavioral problems and confusion. The patient is not nervous/anxious.    All other systems reviewed and are negative.    Objective:     Vital Signs (Most Recent):  Temp: 98.9 °F (37.2 °C) (06/08/20 1248)  Pulse: (!) 114 (06/08/20 1248)  Resp: 18 (06/08/20 1248)  BP: (!) 101/56 (06/08/20 1248)  SpO2: 97 % (06/08/20 1248) Vital Signs (24h Range):  Temp:  [98.5 °F (36.9 °C)-99 °F (37.2 °C)] 98.9 °F (37.2 °C)  Pulse:  [] 114  Resp:  [16-20] 18  SpO2:  [97 %-100 %] 97 %  BP: ()/(53-61) 101/56     Weight: 59 kg (130 lb)  Body mass index is 17.63 kg/m².    Intake/Output Summary (Last 24 hours) at 6/8/2020 1356  Last data filed at 6/8/2020 1200  Gross per 24 hour   Intake 250 ml   Output 600 ml   Net -350 ml      Physical Exam   Constitutional: She is oriented to person, place, and time. Vital signs are normal. She appears well-developed and well-nourished. She is cooperative. She has a sickly appearance. No distress.   Thin, frail, CF resting comfortably in bed in no acute distress.   HENT:   Head: Normocephalic and atraumatic.   Eyes: Conjunctivae are normal.   PERRL   Neck: Neck supple. No JVD present.   Cardiovascular: Regular rhythm and normal heart sounds. Tachycardia present.   Pulmonary/Chest: Effort " normal and breath sounds normal.   Comfortable on RA.   Abdominal: Soft. Bowel sounds are normal. She exhibits distension and ascites. There is tenderness.   + Ascites.  Normoactive BS.  Mild, generalized tenderness.   Musculoskeletal: Normal range of motion.   Neurological: She is alert and oriented to person, place, and time.   Skin: Skin is warm and dry.   Blanchable redness to sacrum.    Psychiatric: Her speech is normal and behavior is normal. Judgment and thought content normal. Her affect is not blunt. She is not slowed, not withdrawn and not combative. Cognition and memory are normal. She exhibits a depressed mood.   Nursing note and vitals reviewed.      Significant Labs:   CBC:   Recent Labs   Lab 06/06/20  2207 06/07/20  0530 06/08/20  0552   WBC 3.52* 6.33 4.77   HGB 10.7* 11.4* 8.4*   HCT 32.1* 35.7* 26.9*   * 121* 86*     CMP:   Recent Labs   Lab 06/07/20  0530 06/07/20  1814 06/08/20  0552    139 137   K 5.2* 4.3 3.9    108 107   CO2 24 19* 21*   GLU 93 149* 85   BUN 27* 31* 31*   CREATININE 2.3* 2.6* 2.0*   CALCIUM 8.7 7.7* 7.3*   PROT 7.2 6.8 6.1   ALBUMIN 2.3* 2.2* 2.0*   BILITOT 2.5* 1.3* 0.8   ALKPHOS 161* 149* 132   AST 45* 39 32   ALT 28 24 22   ANIONGAP 10 12 9   EGFRNONAA 22* 19* 26*     Coagulation:   Recent Labs   Lab 06/08/20  0552   INR 1.3*       Significant Imaging: I have reviewed all pertinent imaging results/findings within the past 24 hours.

## 2020-06-08 NOTE — SIGNIFICANT EVENT
CT abdomen results reviewed. Symptoms of abdominal distention are most likely due to ascites. Will continue to treat symptomatically and consider paracentesis if symptoms persist.

## 2020-06-08 NOTE — CONSULTS
[facility] -   Adult Nutrition  Consult Note    SUMMARY     Recommendations    Recommendation:  1. Continue Cardiac Diet. Consider fluid restriction if UOP does not improve.   2. Order Boost Van. BID (breakfast and lunch) to support energy and protein goals.   3. Consider multivitamin and folate.   4. Weekly weights.     Goals: 75% of EEN and EPN at RD f/u  Nutrition Goal Status: new  Communication of RD Recs: POC, Sticky     Reason for Assessment    Reason For Assessment: consult  Diagnosis: Acute hepatic encepholpathy  Relevant Medical History: Alcoholic liver cirrhosis with Ascites, Hep C, Polysubstance abuse, Pancytopenia, KJ   General Information Comments:  6/8/20  Patient reports she has a good appetite and has maintained about 50% of her intake for the last 1-2 weeks. She admits her intake has been poor prior to this admit due to her diagnosis and polysubstance abuse. She states she usually follows a low salt diet at home. Charts indicate a UBW of 147 lbs at MD office visit 4 months ago (2/12/20). MD diagnosed patient with severe malnutrition during this admit. Per NFPE pt exhibits s/s of severe malnutrition also with severe temporal wasting.   Nutrition Discharge Planning: Low Sodium Diet    Nutrition Risk Screen    Nutrition Risk Screen: no indicators present    Nutrition/Diet History    Spiritual, Cultural Beliefs, Quaker Practices, Values that Affect Care: no    Anthropometrics    Temp: 98.9 °F (37.2 °C)  Height Method: Stated  Height: 6' (182.9 cm)  Height (inches): 72 in  Weight Method: Bed Scale  Weight: 59 kg (130 lb)  Weight (lb): 130 lb  Ideal Body Weight (IBW), Female: 160 lb  % Ideal Body Weight, Female (lb): 81.25 %  BMI (Calculated): 17.6  BMI Grade: 17 - 18.4 protein-energy malnutrition grade I       Lab/Procedures/Meds    Pertinent Labs Reviewed:   BMP  Lab Results   Component Value Date     06/08/2020    K 3.9 06/08/2020     06/08/2020    CO2 21 (L) 06/08/2020     BUN 31 (H) 06/08/2020    CREATININE 2.0 (H) 06/08/2020    CALCIUM 7.3 (L) 06/08/2020    ANIONGAP 9 06/08/2020    ESTGFRAFRICA 30 (A) 06/08/2020    EGFRNONAA 26 (A) 06/08/2020     Lab Results   Component Value Date    CALCIUM 7.3 (L) 06/08/2020    PHOS 4.2 06/08/2020     Lab Results   Component Value Date    ALBUMIN 2.0 (L) 06/08/2020       Pertinent Medications Reviewed: Lactulose, Metoprolol, Heparin, Vanc      Physical Findings/Assessment     Temporal wasting.     Estimated/Assessed Needs    Weight Used For Calorie Calculations: 59 kg (130 lb 1.1 oz)  Energy Calorie Requirements (kcal): 1770  Energy Need Method: Kcal/kg  Protein Requirements: 75-90  Weight Used For Protein Calculations: 59 kg (130 lb 1.1 oz)  Estimated Fluid Requirement Method: RDA Method(or per MD/NP)  RDA Method (mL): 1770         Nutrition Prescription Ordered    Current Diet Order: Cardiac    Evaluation of Received Nutrient/Fluid Intake    I/O: .     Intake/Output Summary (Last 24 hours) at 6/8/2020 1507  Last data filed at 6/8/2020 1200  Gross per 24 hour   Intake 250 ml   Output 600 ml   Net -350 ml       % Intake of Estimated Energy Needs: 25 - 50 %  % Meal Intake: 25 - 50 %    Nutrition Risk    Level of Risk/Frequency of Follow-up: high/2xweek    Assessment and Plan    Nutrition Problem  Inadequate energy intake    Related to (etiology):   Decreased ability to consume sufficient energy- polysubstance abuse    Signs and Symptoms (as evidenced by):   <50% of intake    Interventions/Recommendations (treatment strategy):  Collaboration with other providers  Medical Nutrition Supplement    Nutrition Diagnosis Status:   New           Monitor and Evaluation    Food and Nutrient Intake: energy intake, food and beverage intake  Food and Nutrient Adminstration: diet order  Anthropometric Measurements: weight  Biochemical Data, Medical Tests and Procedures: electrolyte and renal panel, gastrointestinal profile, glucose/endocrine  profile  Nutrition-Focused Physical Findings: overall appearance     Malnutrition Assessment       Patient was diagnosed with sever malnutrition per MD visit this admit. Pt with >10% weight loss in 4 months and severe temporal wasting.     Nutrition Follow-Up    RD Follow-up?: Yes     Maura Field, TIMO, LDN

## 2020-06-08 NOTE — PLAN OF CARE
Spoke with Cheryl at [facility].  They have spoken to the patient in the past but did not have any clinical information to make a decision if she meets criteria.  Cheryl states they are able to manage abdominal drains for ascites.  They prefer the [employer] brand.  Secure chat to CARMEN Rosenthal.    Patient is considering hospice.  She is unsure what home she will discharge to. Preference signed for [facility].   Referral sent via [facility] to [facility].     06/08/20 1224   Post-Acute Status   Post-Acute Authorization Hospice   Hospice Status Referrals Sent

## 2020-06-08 NOTE — SIGNIFICANT EVENT
Detailed discussion held with patient's daughter Flori who is aware of DNR status and that her mom wishes to pursue hospice placement.  Updated on POC, all questions answered.

## 2020-06-08 NOTE — PLAN OF CARE
Discussed patient briefly with hospital medicine. She is a clinic patient of Dr. Green's with alcoholic cirrhosis and drug abuse. Patient is requesting hospice. HM wanted to ensure GI ok with this plan. Ok with hospice, per Dr. Green along with treating any current acute issues. Consult as needed.

## 2020-06-08 NOTE — PLAN OF CARE
Patient currently requires SBA for bed mobility, transfers, and ambulation 60 feet x 2 without assistive device.

## 2020-06-08 NOTE — PROGRESS NOTES
[facility] Hospital Medicine  Progress Note    Patient Name: Jossy Siegel  MRN: [id number]  Patient Class: IP- Inpatient   Admission Date: 6/6/2020  Length of Stay: 1 days  Attending Physician: David Nieto MD  Primary Care Provider: Primary Doctor No        Subjective:     Principal Problem:Acute hepatic encephalopathy        HPI:  Jossy Siegel is a 59 year old female with alcoholic cirrhosis with ascites, hepatitis C and ongoing substance abuse who presented to the ED with reports of AMS. The patient was hospitalized from 6/1/20 to 6/2/20 with similar complaints. She was treated for hepatic encephalopathy. On 6/5/20, she had an outpatient paracentesis on with removal of 3.5 liters of fluid. The patient is unable to provide any additional history at this time. Labs in the ED were significant for an ammonia level of 129, WBC count of 3,520, hemoglobin of 10.7, platelet count of 112,000, creatinine of 2.3 which is elevated from her baseline of 1, albumin of 2.4, total bilirubin of 1.1 and INR of 1.3. Urine drug screen was positive for cocaine and amphetamines.     Overview/Hospital Course:  On 6/6 Patient was admitted to the Medicine unit secondary to Hepatic Encephalopathy and KJ (Cr elevated to 2.3) for which she was given a Lactulose enema in the ER and then transitioned to oral Lactulose once more awake.  Also started on gentle IVFs for KJ.  Ammonia on admit elevated to 139.  Of note, patient has had multiple recent admissions for the same and continues to use Cocaine and Amphetamines, despite numerous conversations regarding the need for cessation.      As of 6/7 Patient is now AAOx3, and conversing appropriately.  Ammonia improved to 38 this morning.  Cr remains elevated at 2.3.  Will plan to continue gentle IVFs overnight and anticipate DC home in AM if renal function improves.  Detailed conversation held again today, for >15 minutes regarding the complete need for illicit  drug cessation; patient does not appear interested in stopping at this time.      As of 6/8 Patient noted to have increased abdominal distention yesterday evening for which a KUB and CT were ordered.  CT showed there is fluid throughout the nondistended colon.  The ascending colon appears to be thick walled, similar to the comparison study. There is some fluid in nondistended distal small bowel.  Contrast fills the proximal small bowel.  There are no dilated loops of bowel or free air noted.  Findings most likely represent colitis, gastroenteritis or diarrheal disease.  Negative for acute process otherwise.  Preliminary BC from 6/6 show Viridans strep.  Case d/w ID and patient was started on Rocephin and Vancomycin.  Repeat BC ordered for the AM.  ECHO pending.  To undergo paracentesis today.  Per detailed d/w patient yesterday, she is now a DNR and would like to pursue hospice placement upon DC.  CM following for DC planning.  Case d/w GI today as patient is well known to Dr. Green.  GI agrees with hospice placement after treatment of acute medical issues.  Will await further recs from ID and use cultures to guide ABX therapy.  Patient remains AAOx3., with resolution of hepatic encephalopathy.    Interval History:  No acute events overnight.  Patient currently resting comfortably in bed in no acute distress.  Patient noted to have increased abdominal distention yesterday evening for which a KUB and CT were ordered.  CT showed there is fluid throughout the nondistended colon.  The ascending colon appears to be thick walled, similar to the comparison study. There is some fluid in nondistended distal small bowel.  Contrast fills the proximal small bowel.  There are no dilated loops of bowel or free air noted.  Findings most likely represent colitis, gastroenteritis or diarrheal disease.  Negative for acute process otherwise.  Preliminary BC from 6/6 show Viridans strep.  Case d/w ID and patient was started on Rocephin  "and Vancomycin.  Repeat BC ordered for the AM.  ECHO pending.  To undergo paracentesis today.  Per detailed d/w patient yesterday, she is now a DNR and would like to pursue hospice placement upon DC.  CM following for DC planning.  Case d/w GI today as patient is well known to Dr. Green.  GI agrees with hospice placement after treatment of acute medical issues.  Will await further recs from ID and use cultures to guide ABX therapy.  Patient remains AAOx3., with resolution of hepatic encephalopathy.    Review of Systems   Constitutional: Positive for fatigue. Negative for chills, diaphoresis and fever.   HENT: Negative for facial swelling, hearing loss, mouth sores, sneezing, sore throat, tinnitus and trouble swallowing.    Eyes: Negative for photophobia, pain, discharge, redness and visual disturbance.   Respiratory: Negative for apnea, cough, choking, chest tightness, shortness of breath, wheezing and stridor.    Cardiovascular: Negative for chest pain, palpitations and leg swelling.   Gastrointestinal: Positive for abdominal distention and abdominal pain. Negative for anal bleeding, blood in stool, constipation, diarrhea, nausea, rectal pain and vomiting.   Endocrine: Negative for cold intolerance, heat intolerance, polydipsia, polyphagia and polyuria.   Genitourinary: Negative for difficulty urinating, dysuria, flank pain, frequency, hematuria, pelvic pain, urgency, vaginal bleeding, vaginal discharge and vaginal pain.   Musculoskeletal: Positive for arthralgias, back pain and myalgias. Negative for gait problem and neck stiffness.        C/o pain "all over".   Skin: Negative for pallor, rash and wound.   Allergic/Immunologic: Negative for food allergies.   Neurological: Positive for weakness. Negative for dizziness, tremors, seizures, syncope, speech difficulty and headaches.   Hematological: Negative for adenopathy. Does not bruise/bleed easily.   Psychiatric/Behavioral: Negative for behavioral problems and " confusion. The patient is not nervous/anxious.    All other systems reviewed and are negative.    Objective:     Vital Signs (Most Recent):  Temp: 98.9 °F (37.2 °C) (06/08/20 1248)  Pulse: (!) 114 (06/08/20 1248)  Resp: 18 (06/08/20 1248)  BP: (!) 101/56 (06/08/20 1248)  SpO2: 97 % (06/08/20 1248) Vital Signs (24h Range):  Temp:  [98.5 °F (36.9 °C)-99 °F (37.2 °C)] 98.9 °F (37.2 °C)  Pulse:  [] 114  Resp:  [16-20] 18  SpO2:  [97 %-100 %] 97 %  BP: ()/(53-61) 101/56     Weight: 59 kg (130 lb)  Body mass index is 17.63 kg/m².    Intake/Output Summary (Last 24 hours) at 6/8/2020 1356  Last data filed at 6/8/2020 1200  Gross per 24 hour   Intake 250 ml   Output 600 ml   Net -350 ml      Physical Exam   Constitutional: She is oriented to person, place, and time. Vital signs are normal. She appears well-developed and well-nourished. She is cooperative. She has a sickly appearance. No distress.   Thin, frail, CF resting comfortably in bed in no acute distress.   HENT:   Head: Normocephalic and atraumatic.   Eyes: Conjunctivae are normal.   PERRL   Neck: Neck supple. No JVD present.   Cardiovascular: Regular rhythm and normal heart sounds. Tachycardia present.   Pulmonary/Chest: Effort normal and breath sounds normal.   Comfortable on RA.   Abdominal: Soft. Bowel sounds are normal. She exhibits distension and ascites. There is tenderness.   + Ascites.  Normoactive BS.  Mild, generalized tenderness.   Musculoskeletal: Normal range of motion.   Neurological: She is alert and oriented to person, place, and time.   Skin: Skin is warm and dry.   Blanchable redness to sacrum.    Psychiatric: Her speech is normal and behavior is normal. Judgment and thought content normal. Her affect is not blunt. She is not slowed, not withdrawn and not combative. Cognition and memory are normal. She exhibits a depressed mood.   Nursing note and vitals reviewed.      Significant Labs:   CBC:   Recent Labs   Lab 06/06/20  2207  06/07/20  0530 06/08/20  0552   WBC 3.52* 6.33 4.77   HGB 10.7* 11.4* 8.4*   HCT 32.1* 35.7* 26.9*   * 121* 86*     CMP:   Recent Labs   Lab 06/07/20  0530 06/07/20  1814 06/08/20  0552    139 137   K 5.2* 4.3 3.9    108 107   CO2 24 19* 21*   GLU 93 149* 85   BUN 27* 31* 31*   CREATININE 2.3* 2.6* 2.0*   CALCIUM 8.7 7.7* 7.3*   PROT 7.2 6.8 6.1   ALBUMIN 2.3* 2.2* 2.0*   BILITOT 2.5* 1.3* 0.8   ALKPHOS 161* 149* 132   AST 45* 39 32   ALT 28 24 22   ANIONGAP 10 12 9   EGFRNONAA 22* 19* 26*     Coagulation:   Recent Labs   Lab 06/08/20  0552   INR 1.3*       Significant Imaging: I have reviewed all pertinent imaging results/findings within the past 24 hours.      Assessment/Plan:      * Acute hepatic encephalopathy  - Now resolved  - Continue Lactulose TID   - Continue Rifaximin  - Neuro checks q 4 hours  - Monitor       Cirrhosis of liver with ascites related to hepatitis C virus  - Continue Lactulose and Rifaximin  - To undergo paracentesis today; will need to r/o SBP  - Currently on Rocephin and Vancomycin given Strep bacteremia  - Hold home diuretics for now, will likely resume in AM pending renal function  - Daily labs  - Patient wishes to pursue Hospice care upon DC; Case d/w GI, who agrees with Hospice care after resolution of acute issues  - CM following for DC planning    MELD-Na score: 16 at 6/8/2020  5:52 AM  MELD score: 16 at 6/8/2020  5:52 AM  Calculated from:  Serum Creatinine: 2.0 mg/dL at 6/8/2020  5:52 AM  Serum Sodium: 137 mmol/L at 6/8/2020  5:52 AM  Total Bilirubin: 0.8 mg/dL (Rounded to 1 mg/dL) at 6/8/2020  5:52 AM  INR(ratio): 1.3 at 6/8/2020  5:52 AM  Age: 59 years      KJ (acute kidney injury)  - Likely due to decreased intake with associated intravascular volume depletion.   - baseline Cr appears to be 1.2-1.4  - IVFs stopped yesterday  - Cr improved to 2.0  - Hold home diuretics for now and resume when safe  - Caution nephrotoxic medications.  Monitor with daily labs.       Bacteremia  - Preliminary BC from 6/6 show viridans strep  - ID consulted; currently on Rocephin and Vancomycin for now with additional recs pending  - ECHO pending  - Repeat BC in AM      Abnormal CT of the abdomen  - CT abdomen showed findings most likely represent colitis, gastroenteritis or diarrheal disease.  Negative for acute process otherwise  - Remains afebrile with no leukocytosis  - Denies N/V  - On Lactulose, so patient with frequent stools  - Stool Cx, ova/parasites pending  - Continue Rocephin and Vancomycin  - Will use cultures to guide ABX therapy  - Monitor     Polysubstance abuse  - Counseled extensively on the need for complete illicit drug cessation, patient verbalizes understanding and expresses desire to quit, however she has had multiple admissions over the past several months due to noncompliance with home medications and continued ongoing illicit drug use    Pancytopenia  - Counts appears stable  - Monitor with daily CBC      Tobacco abuse  - Counseled on cessation      Chronic bilateral low back pain without sciatica  - PRN analgesia        Severe malnutrition  - Dietician consult pending for any additional recommendations    VTE Risk Mitigation (From admission, onward)         Ordered     heparin (porcine) injection 5,000 Units  Every 8 hours      06/07/20 0301                      Ariadna Alegre, TIMA, ACNP-BC  Department of Hospital Medicine   [facility]

## 2020-06-08 NOTE — OP NOTE
Pre Op Diagnosis: ascites     Post Op Diagnosis: same     Procedure:  para     Procedure performed by: Ameya MONTENEGRO, Herbert ONTIVEROS     Written Informed Consent Obtained: Yes     Specimen Removed:  yes     Estimated Blood Loss:  minimal     Findings: Local anesthesia.     The patient tolerated the procedure well and there were no complications.      Sterile technique was performed in the RLQ, lidocaine was used as a local anesthetic.   2.75 liters of colette fluid removed for therapeutic purposes.  Pt tolerated the procedure well without immediate complications.  Please see radiologist report for details. F/u with PCP and/or ordering physician.

## 2020-06-08 NOTE — ASSESSMENT & PLAN NOTE
- Preliminary BC from 6/6 show viridans strep  - ID consulted; currently on Rocephin and Vancomycin for now with additional recs pending  - ECHO pending  - Repeat BC in AM

## 2020-06-08 NOTE — ASSESSMENT & PLAN NOTE
- Likely due to decreased intake with associated intravascular volume depletion.   - baseline Cr appears to be 1.2-1.4  - IVFs stopped yesterday  - Cr improved to 2.0  - Hold home diuretics for now and resume when safe  - Caution nephrotoxic medications.  Monitor with daily labs.

## 2020-06-08 NOTE — ASSESSMENT & PLAN NOTE
- Continue Lactulose and Rifaximin  - To undergo paracentesis today; will need to r/o SBP  - Currently on Rocephin and Vancomycin given Strep bacteremia  - Hold home diuretics for now, will likely resume in AM pending renal function  - Daily labs  - Patient wishes to pursue Hospice care upon DC; Case d/w GI, who agrees with Hospice care after resolution of acute issues  - CM following for DC planning    MELD-Na score: 16 at 6/8/2020  5:52 AM  MELD score: 16 at 6/8/2020  5:52 AM  Calculated from:  Serum Creatinine: 2.0 mg/dL at 6/8/2020  5:52 AM  Serum Sodium: 137 mmol/L at 6/8/2020  5:52 AM  Total Bilirubin: 0.8 mg/dL (Rounded to 1 mg/dL) at 6/8/2020  5:52 AM  INR(ratio): 1.3 at 6/8/2020  5:52 AM  Age: 59 years

## 2020-06-08 NOTE — PROGRESS NOTES
Abdominal X-ray reviewed. Case d/w night team Ana and Dr. Gomez. CT abdomen pending to evaluate further. Patient currently denies nausea with no vomiting reported. May need placement of an NG tube for decompression. Night team to f/u and place orders accordingly.

## 2020-06-09 PROBLEM — N17.9 AKI (ACUTE KIDNEY INJURY): Status: RESOLVED | Noted: 2020-06-07 | Resolved: 2020-06-09

## 2020-06-09 LAB
ALBUMIN FLD-MCNC: <0.3 G/DL
ALBUMIN SERPL BCP-MCNC: 2 G/DL (ref 3.5–5.2)
ALP SERPL-CCNC: 152 U/L (ref 55–135)
ALT SERPL W/O P-5'-P-CCNC: 21 U/L (ref 10–44)
ANION GAP SERPL CALC-SCNC: 6 MMOL/L (ref 8–16)
AST SERPL-CCNC: 34 U/L (ref 10–40)
BACTERIA BLD CULT: ABNORMAL
BASOPHILS # BLD AUTO: 0.03 K/UL (ref 0–0.2)
BASOPHILS NFR BLD: 1.1 % (ref 0–1.9)
BILIRUB SERPL-MCNC: 0.7 MG/DL (ref 0.1–1)
BODY FLUID SOURCE, LDH: NORMAL
BUN SERPL-MCNC: 18 MG/DL (ref 8–23)
CALCIUM SERPL-MCNC: 7.3 MG/DL (ref 8.7–10.5)
CHLORIDE SERPL-SCNC: 108 MMOL/L (ref 95–110)
CO2 SERPL-SCNC: 23 MMOL/L (ref 23–29)
CREAT SERPL-MCNC: 1.2 MG/DL (ref 0.5–1.4)
DIFFERENTIAL METHOD: ABNORMAL
EOSINOPHIL # BLD AUTO: 0.2 K/UL (ref 0–0.5)
EOSINOPHIL NFR BLD: 5.7 % (ref 0–8)
ERYTHROCYTE [DISTWIDTH] IN BLOOD BY AUTOMATED COUNT: 16.5 % (ref 11.5–14.5)
EST. GFR  (AFRICAN AMERICAN): 56 ML/MIN/1.73 M^2
EST. GFR  (NON AFRICAN AMERICAN): 49 ML/MIN/1.73 M^2
GLUCOSE SERPL-MCNC: 84 MG/DL (ref 70–110)
GRAM STN SPEC: NORMAL
GRAM STN SPEC: NORMAL
HCT VFR BLD AUTO: 27.1 % (ref 37–48.5)
HGB BLD-MCNC: 8.6 G/DL (ref 12–16)
IMM GRANULOCYTES # BLD AUTO: 0.03 K/UL (ref 0–0.04)
IMM GRANULOCYTES NFR BLD AUTO: 1.1 % (ref 0–0.5)
INR PPP: 1.2 (ref 0.8–1.2)
LDH FLD L TO P-CCNC: 45 U/L
LYMPHOCYTES # BLD AUTO: 0.6 K/UL (ref 1–4.8)
LYMPHOCYTES NFR BLD: 21.6 % (ref 18–48)
MAGNESIUM SERPL-MCNC: 1.8 MG/DL (ref 1.6–2.6)
MCH RBC QN AUTO: 32.6 PG (ref 27–31)
MCHC RBC AUTO-ENTMCNC: 31.7 G/DL (ref 32–36)
MCV RBC AUTO: 103 FL (ref 82–98)
MONOCYTES # BLD AUTO: 0.5 K/UL (ref 0.3–1)
MONOCYTES NFR BLD: 17.3 % (ref 4–15)
NEUTROPHILS # BLD AUTO: 1.5 K/UL (ref 1.8–7.7)
NEUTROPHILS NFR BLD: 53.2 % (ref 38–73)
NRBC BLD-RTO: 0 /100 WBC
PATH INTERP FLD-IMP: NORMAL
PHOSPHATE SERPL-MCNC: 3.3 MG/DL (ref 2.7–4.5)
PLATELET # BLD AUTO: 88 K/UL (ref 150–350)
PMV BLD AUTO: 11.9 FL (ref 9.2–12.9)
POTASSIUM SERPL-SCNC: 4 MMOL/L (ref 3.5–5.1)
PROT FLD-MCNC: <1 G/DL
PROT SERPL-MCNC: 6.3 G/DL (ref 6–8.4)
PROTHROMBIN TIME: 12.6 SEC (ref 9–12.5)
RBC # BLD AUTO: 2.64 M/UL (ref 4–5.4)
SODIUM SERPL-SCNC: 137 MMOL/L (ref 136–145)
SPECIMEN SOURCE: NORMAL
SPECIMEN SOURCE: NORMAL
WBC # BLD AUTO: 2.83 K/UL (ref 3.9–12.7)

## 2020-06-09 PROCEDURE — 99233 PR SUBSEQUENT HOSPITAL CARE,LEVL III: ICD-10-PCS | Mod: ,,, | Performed by: INTERNAL MEDICINE

## 2020-06-09 PROCEDURE — 63600175 PHARM REV CODE 636 W HCPCS: Performed by: PHYSICIAN ASSISTANT

## 2020-06-09 PROCEDURE — 87046 STOOL CULTR AEROBIC BACT EA: CPT

## 2020-06-09 PROCEDURE — 25000003 PHARM REV CODE 250: Performed by: NURSE PRACTITIONER

## 2020-06-09 PROCEDURE — 84100 ASSAY OF PHOSPHORUS: CPT

## 2020-06-09 PROCEDURE — 87045 FECES CULTURE AEROBIC BACT: CPT

## 2020-06-09 PROCEDURE — 87427 SHIGA-LIKE TOXIN AG IA: CPT

## 2020-06-09 PROCEDURE — 36415 COLL VENOUS BLD VENIPUNCTURE: CPT

## 2020-06-09 PROCEDURE — 11000001 HC ACUTE MED/SURG PRIVATE ROOM

## 2020-06-09 PROCEDURE — 85025 COMPLETE CBC W/AUTO DIFF WBC: CPT

## 2020-06-09 PROCEDURE — 83735 ASSAY OF MAGNESIUM: CPT

## 2020-06-09 PROCEDURE — 87209 SMEAR COMPLEX STAIN: CPT

## 2020-06-09 PROCEDURE — 96372 THER/PROPH/DIAG INJ SC/IM: CPT

## 2020-06-09 PROCEDURE — 21400001 HC TELEMETRY ROOM

## 2020-06-09 PROCEDURE — 85610 PROTHROMBIN TIME: CPT

## 2020-06-09 PROCEDURE — 63600175 PHARM REV CODE 636 W HCPCS: Performed by: NURSE PRACTITIONER

## 2020-06-09 PROCEDURE — 99233 SBSQ HOSP IP/OBS HIGH 50: CPT | Mod: ,,, | Performed by: INTERNAL MEDICINE

## 2020-06-09 PROCEDURE — 97110 THERAPEUTIC EXERCISES: CPT

## 2020-06-09 PROCEDURE — 80053 COMPREHEN METABOLIC PANEL: CPT

## 2020-06-09 PROCEDURE — 63600175 PHARM REV CODE 636 W HCPCS: Performed by: INTERNAL MEDICINE

## 2020-06-09 PROCEDURE — 87040 BLOOD CULTURE FOR BACTERIA: CPT | Mod: 59

## 2020-06-09 PROCEDURE — 97116 GAIT TRAINING THERAPY: CPT

## 2020-06-09 RX ORDER — SPIRONOLACTONE 25 MG/1
100 TABLET ORAL 2 TIMES DAILY
Status: DISCONTINUED | OUTPATIENT
Start: 2020-06-09 | End: 2020-06-11 | Stop reason: HOSPADM

## 2020-06-09 RX ORDER — FUROSEMIDE 40 MG/1
40 TABLET ORAL 2 TIMES DAILY
Status: DISCONTINUED | OUTPATIENT
Start: 2020-06-09 | End: 2020-06-11 | Stop reason: HOSPADM

## 2020-06-09 RX ADMIN — PANTOPRAZOLE SODIUM 40 MG: 40 TABLET, DELAYED RELEASE ORAL at 08:06

## 2020-06-09 RX ADMIN — METOPROLOL TARTRATE 25 MG: 25 TABLET ORAL at 09:06

## 2020-06-09 RX ADMIN — MORPHINE SULFATE 1 MG: 2 INJECTION, SOLUTION INTRAMUSCULAR; INTRAVENOUS at 01:06

## 2020-06-09 RX ADMIN — RIFAXIMIN 550 MG: 550 TABLET ORAL at 09:06

## 2020-06-09 RX ADMIN — RIFAXIMIN 550 MG: 550 TABLET ORAL at 08:06

## 2020-06-09 RX ADMIN — SPIRONOLACTONE 100 MG: 25 TABLET ORAL at 08:06

## 2020-06-09 RX ADMIN — FUROSEMIDE 40 MG: 40 TABLET ORAL at 06:06

## 2020-06-09 RX ADMIN — HEPARIN SODIUM 5000 UNITS: 5000 INJECTION INTRAVENOUS; SUBCUTANEOUS at 05:06

## 2020-06-09 RX ADMIN — LACTULOSE 15 G: 20 SOLUTION ORAL at 03:06

## 2020-06-09 RX ADMIN — MORPHINE SULFATE 1 MG: 2 INJECTION, SOLUTION INTRAMUSCULAR; INTRAVENOUS at 03:06

## 2020-06-09 RX ADMIN — VANCOMYCIN HYDROCHLORIDE 1500 MG: 1.5 INJECTION, POWDER, LYOPHILIZED, FOR SOLUTION INTRAVENOUS at 12:06

## 2020-06-09 RX ADMIN — MORPHINE SULFATE 1 MG: 2 INJECTION, SOLUTION INTRAMUSCULAR; INTRAVENOUS at 05:06

## 2020-06-09 RX ADMIN — MORPHINE SULFATE 0.5 MG: 2 INJECTION, SOLUTION INTRAMUSCULAR; INTRAVENOUS at 08:06

## 2020-06-09 RX ADMIN — AMITRIPTYLINE HYDROCHLORIDE 25 MG: 25 TABLET, FILM COATED ORAL at 08:06

## 2020-06-09 RX ADMIN — CEFTRIAXONE 2 G: 2 INJECTION, SOLUTION INTRAVENOUS at 08:06

## 2020-06-09 RX ADMIN — MORPHINE SULFATE 1 MG: 2 INJECTION, SOLUTION INTRAMUSCULAR; INTRAVENOUS at 09:06

## 2020-06-09 RX ADMIN — PANTOPRAZOLE SODIUM 40 MG: 40 TABLET, DELAYED RELEASE ORAL at 09:06

## 2020-06-09 RX ADMIN — LACTULOSE 15 G: 20 SOLUTION ORAL at 08:06

## 2020-06-09 RX ADMIN — LACTULOSE 15 G: 20 SOLUTION ORAL at 09:06

## 2020-06-09 NOTE — ASSESSMENT & PLAN NOTE
- Preliminary BC from 6/6 show viridans strep  - ID consulted; currently on Rocephin and Vancomycin for now with additional recs pending  - ECHO pending  - Repeat BC pending

## 2020-06-09 NOTE — CONSULTS
[facility]  Infectious Disease  Consult Note    Patient Name: Jossy Siegel  MRN: [id number]  Admission Date: 6/6/2020  Hospital Length of Stay: 2 days  Attending Physician: David Nieto MD  Primary Care Provider: Primary Doctor No     Isolation Status: No active isolations    Patient information was obtained from past medical records and ER records.      Consults  Assessment/Plan:     * Acute hepatic encephalopathy  Will continue Rifaximin, GI follow up     Bacteremia    Blood culture - is positive for viridans strep group .  Will continue Rocephin .  Will follow up with repeat culture.        SBP (spontaneous bacterial peritonitis)  Component      Latest Ref Rng & Units 6/8/2020 6/5/2020   WBC, Body Fluid      /cu mm 904 69   Segs, Fluid      % 76 6   Lymphs, Fluid      %  22   Monocytes/Macrophages, Fluid      % 24 72     Will continue Rocephin.stop Vancomycin       Cirrhosis of liver with ascites related to hepatitis C virus  GI follow up         Thank you for your consult. I will follow-up with patient. Please contact us if you have any additional questions.    Yakov Victor MD  Infectious Disease  [facility]    Subjective:     Principal Problem: Acute hepatic encephalopathy    HPI:    59 year old female with alcoholic cirrhosis with ascites, hepatitis C and ongoing substance abuse who presented to the ED with reports of AMS. She was recently  hospitalized from 6/1/20 to 6/2/20 . She was treated for hepatic encephalopathy.  Since admission, blood culture is now positive for strep viridans.  She had interval paracentesis-  Right lower quadrant paracentesis with removal of 2.75 L of ascitic fluid without difficulty..   Ascitic fluid-    Component      Latest Ref Rng & Units 6/8/2020 6/5/2020   WBC, Body Fluid      /cu mm 904 69   Segs, Fluid      % 76 6   Lymphs, Fluid      %  22   Monocytes/Macrophages, Fluid      % 24 72     She is non verbal.    Past Medical  History:   Diagnosis Date    Alcoholic cirrhosis of liver with ascites     Anemia     Cirrhosis     Hepatic encephalopathy     Hepatitis C     Pancreatitis     Pancytopenia 2/5/2020    Last Assessment & Plan:  History & Physical Chronic.  Stable.  Follow-up repeat CBC and INR in a.m.  Discharge Summary  Chronic.  Stable. Likely secondary to cirrhosis. Follow-up  Chronic.  Stable. Likely secondary to cirrhosis.    Renal disorder     Thrombocytopenia        Past Surgical History:   Procedure Laterality Date    COLONOSCOPY      ESOPHAGOGASTRODUODENOSCOPY W/ BANDING      Metal plate in pelvis      PARACENTESIS         Review of patient's allergies indicates:  No Known Allergies    Medications:  Medications Prior to Admission   Medication Sig    albuterol (PROVENTIL/VENTOLIN HFA) 90 mcg/actuation inhaler Inhale 2 puffs into the lungs every 6 (six) hours.    amitriptyline (ELAVIL) 25 MG tablet Take 25 mg by mouth every evening.    ciprofloxacin HCl (CIPRO) 500 MG tablet Take 1 tablet (500 mg total) by mouth once a week.    cyclobenzaprine (FLEXERIL) 10 MG tablet TAKE 1 TABLET BY MOUTH 3 (THREE) TIMES DAILY AS NEEDED FOR MUSCLE SPASMS (PAIN) FOR UP TO 10 DAYS.    furosemide (LASIX) 40 MG tablet Take 1 tablet (40 mg total) by mouth 2 (two) times daily.    lactulose 10 gram/15 ml (CHRONULAC) 10 gram/15 mL (15 mL) solution Take 15 mLs (10 g total) by mouth 3 (three) times daily.    metoprolol tartrate (LOPRESSOR) 25 MG tablet Take 1 tablet (25 mg total) by mouth 2 (two) times daily.    ondansetron (ZOFRAN) 4 MG tablet TAKE 1 TABLET BY MOUTH EVERY 8 (EIGHT) HOURS AS NEEDED FOR NAUSEA FORUP TO 7 DAYS.    ondansetron (ZOFRAN-ODT) 4 MG TbDL Take 1 tablet (4 mg total) by mouth every 8 (eight) hours as needed (nausea).    pantoprazole (PROTONIX) 40 MG tablet Take 1 tablet (40 mg total) by mouth 2 (two) times daily.    rifAXImin (XIFAXAN) 200 mg Tab TAKE 1 TABLET BY MOUTH 2 (TWO) TIMES DAILY BEFORE MEALS FOR  14 DAYS.    spironolactone (ALDACTONE) 100 MG tablet Take 1 tablet (100 mg total) by mouth 2 (two) times daily.     Antibiotics (From admission, onward)    Start     Stop Route Frequency Ordered    06/09/20 0030  vancomycin 1.5 g in dextrose 5 % 250 mL IVPB (ready to mix)      -- IV Once 06/09/20 0023    06/07/20 2102  vancomycin - pharmacy to dose  (vancomycin with pharmacy to dose consult)      -- IV pharmacy to manage frequency 06/07/20 2003 06/07/20 1930  cefTRIAXone (ROCEPHIN) 2 g/50 mL D5W IVPB      -- IV Every 24 hours (non-standard times) 06/07/20 1828    06/07/20 1215  rifAXIMin tablet 550 mg      -- Oral 2 times daily 06/07/20 1204        Antifungals (From admission, onward)    None        Antivirals (From admission, onward)    None             There is no immunization history on file for this patient.    Family History     None        Social History     Socioeconomic History    Marital status: Significant Other     Spouse name: Not on file    Number of children: Not on file    Years of education: Not on file    Highest education level: Not on file   Occupational History    Not on file   Social Needs    Financial resource strain: Not on file    Food insecurity:     Worry: Not on file     Inability: Not on file    Transportation needs:     Medical: Not on file     Non-medical: Not on file   Tobacco Use    Smoking status: Current Every Day Smoker     Packs/day: 0.25     Years: 45.00     Pack years: 11.25     Types: Cigarettes    Smokeless tobacco: Never Used   Substance and Sexual Activity    Alcohol use: Not Currently    Drug use: Yes     Types: Cocaine    Sexual activity: Not on file   Lifestyle    Physical activity:     Days per week: Not on file     Minutes per session: Not on file    Stress: Not on file   Relationships    Social connections:     Talks on phone: Not on file     Gets together: Not on file     Attends Ten[employer] service: Not on file     Active member of club or  organization: Not on file     Attends meetings of clubs or organizations: Not on file     Relationship status: Not on file   Other Topics Concern    Not on file   Social History Narrative    Not on file     Review of Systems   Unable to perform ROS: Acuity of condition     Objective:     Vital Signs (Most Recent):  Temp: 98.9 °F (37.2 °C) (06/08/20 2353)  Pulse: 86 (06/08/20 2353)  Resp: 18 (06/08/20 2353)  BP: (!) 111/58 (06/08/20 2353)  SpO2: (!) 94 % (06/08/20 2353) Vital Signs (24h Range):  Temp:  [98.5 °F (36.9 °C)-99 °F (37.2 °C)] 98.9 °F (37.2 °C)  Pulse:  [] 86  Resp:  [18] 18  SpO2:  [94 %-98 %] 94 %  BP: ()/(56-73) 111/58     Weight: 59 kg (130 lb)  Body mass index is 17.63 kg/m².    Estimated Creatinine Clearance: 27.5 mL/min (A) (based on SCr of 2 mg/dL (H)).    Physical Exam   Constitutional: She is oriented to person, place, and time. Vital signs are normal. She appears well-developed and well-nourished. She is cooperative. Sickly appearance: - No distress.   Thin, frail, CF resting comfortably in bed in no acute distress.   HENT:   Head: Normocephalic and atraumatic.   Eyes: Conjunctivae are normal.   PERRL   Neck: Neck supple. No JVD present.   Cardiovascular: Regular rhythm and normal heart sounds. Tachycardia present.   Pulmonary/Chest: Effort normal and breath sounds normal.   Comfortable on RA.   Abdominal: Soft. Bowel sounds are normal. She exhibits distension and ascites. There is tenderness.   + Ascites.  Normoactive BS.    Musculoskeletal: Normal range of motion.   Neurological: She is alert and oriented to person, place, and time.   Skin: Skin is warm and dry.   Blanchable redness to sacrum.    Psychiatric: Her speech is normal and behavior is normal. Judgment and thought content normal. Her affect is not blunt. She is not slowed, not withdrawn and not combative. Cognition and memory are normal. She exhibits a depressed mood.   Nursing note and vitals reviewed.      Significant  Labs:   Blood Culture:   Recent Labs   Lab 04/22/20  0055 05/14/20  0015 05/14/20  0023 06/06/20  2220 06/06/20  2236   LABBLOO No Growth after 4 days.  No Growth after 4 days.  No Growth after 4 days.  Gram stain sylwia bottle: Gram positive cocci in chains resembling Strep   Results called to and read back by:Renetta Paredes RN 06/07/2020  17:47  Gram stain aer bottle: Gram positive cocci in chains resembling Strep   06/07/2020  18:55  VIRIDANS STREPTOCOCCUS GROUP  Susceptibility testing not routinely performed  * No Growth to date  No Growth to date     BMP:   Recent Labs   Lab 06/08/20  0552   GLU 85      K 3.9      CO2 21*   BUN 31*   CREATININE 2.0*   CALCIUM 7.3*   MG 1.7     CBC:   Recent Labs   Lab 06/07/20  0530 06/08/20  0552   WBC 6.33 4.77   HGB 11.4* 8.4*   HCT 35.7* 26.9*   * 86*     All pertinent labs within the past 24 hours have been reviewed.    Significant Imaging: I have reviewed all pertinent imaging results/findings within the past 24 hours.

## 2020-06-09 NOTE — ASSESSMENT & PLAN NOTE
- s/p paracentesis on 6/8 with removal of 2.3L  - Fluid studies consistent with SBP  - Fluid cx with no growth thus far  - Continue Rocephin and Vanc  - Plan to repeat paracentesis Thursday

## 2020-06-09 NOTE — PT/OT/SLP PROGRESS
Physical Therapy  Treatment    Jossy Siegel   MRN: [id number]   Admitting Diagnosis: Acute hepatic encephalopathy    PT Received On: 06/09/20  PT Start Time: 1000     PT Stop Time: 1023    PT Total Time (min): 23 min       Billable Minutes:  Gait Training 13 and Therapeutic Exercise 10    Treatment Type: Treatment  PT/PTA: PT     PTA Visit Number: 0       General Precautions: Standard, fall  Orthopedic Precautions: N/A   Braces: N/A    Spiritual, Cultural Beliefs, Tenriism Practices, Values that Affect Care: no    Subjective:  Communicated with NURSE BRIGGS AND Epic CHART REVIEW  prior to session.   PT AGREED TO TX     Pain/Comfort  Pain Rating 1: 0/10  Pain Rating Post-Intervention 1: 0/10    Objective:   Patient found with: peripheral IV    Functional Mobility:  PT MET IN RM SUP.SIT EOB WITH SBA. PT SCOOTED ON BED AND P.T. ASSIST IN DONNING SWEAT PANTS. PT STOOD WITH RW AND GT TRAINED X 450' WITH RW AND CGA>CLOSE SBA. PT RETURNED TO  T.F TO CHAIR WITH RW AND CGA. PT COMPLETED B LE TE X 20 REPS OF AP, TKE, AND MIP. PT LEFT SEATED IN CHAIR WITH ALL NEEDS MET AND CALL BELL IN REACH.     AM-PAC 6 CLICK MOBILITY  How much help from another person does this patient currently need?   1 = Unable, Total/Dependent Assistance  2 = A lot, Maximum/Moderate Assistance  3 = A little, Minimum/Contact Guard/Supervision  4 = None, Modified [place]/Independent    Turning over in bed (including adjusting bedclothes, sheets and blankets)?: 4  Sitting down on and standing up from a chair with arms (e.g., wheelchair, bedside commode, etc.): 3  Moving from lying on back to sitting on the side of the bed?: 4  Moving to and from a bed to a chair (including a wheelchair)?: 3  Need to walk in hospital room?: 3  Climbing 3-5 steps with a railing?: 1  Basic Mobility Total Score: 18    AM-PAC Raw Score CMS G-Code Modifier Level of Impairment Assistance   6 % Total / Unable   7 - 9 CM 80 - 100% Maximal Assist   10 - 14 CL  60 - 80% Moderate Assist   15 - 19 CK 40 - 60% Moderate Assist   20 - 22 CJ 20 - 40% Minimal Assist   23 CI 1-20% SBA / CGA   24 CH 0% Independent/ Mod I     Patient left up in chair with call button in reach and chair alarm on.    Assessment:  PT PROGRESSING WITH GT TRAINING.     Rehab identified problem list/impairments: Rehab identified problem list/impairments: weakness, impaired functional mobilty, impaired endurance, gait instability, impaired balance, decreased lower extremity function    Rehab potential is good.    Activity tolerance: Good    Discharge recommendations: Discharge Facility/Level of Care Needs: home health PT     Barriers to discharge:      Equipment recommendations: Equipment Needed After Discharge: none     GOALS:   Multidisciplinary Problems     Physical Therapy Goals        Problem: Physical Therapy Goal    Goal Priority Disciplines Outcome Goal Variances Interventions   Physical Therapy Goal     PT, PT/OT Ongoing, Progressing     Description:  1. Patient will perform supine to/from sit mod indep  2. Patient will perform sit to/from stand with RW mod indep  3. Patient will amb >300ft RW mod indep no gross LOB                    PLAN:    Patient to be seen 5 x/week  to address the above listed problems via gait training, therapeutic activities, therapeutic exercises  Plan of Care expires: 06/14/20  Plan of Care reviewed with: patient         Mar yJo Torrez, PT  06/09/2020

## 2020-06-09 NOTE — PLAN OF CARE
AAO x4. IV ABX administered as ordered. PRN medication administered for pain control. Neuro check q 4 hr.Cardiac monitor NSR - Tach. Afebrile. No acute adverse events noted. Pt remained free from falls this shift. Will continue to monitor. Chart reviewed.

## 2020-06-09 NOTE — SUBJECTIVE & OBJECTIVE
Past Medical History:   Diagnosis Date    Alcoholic cirrhosis of liver with ascites     Anemia     Cirrhosis     Hepatic encephalopathy     Hepatitis C     Pancreatitis     Pancytopenia 2/5/2020    Last Assessment & Plan:  History & Physical Chronic.  Stable.  Follow-up repeat CBC and INR in a.m.  Discharge Summary  Chronic.  Stable. Likely secondary to cirrhosis. Follow-up  Chronic.  Stable. Likely secondary to cirrhosis.    Renal disorder     Thrombocytopenia        Past Surgical History:   Procedure Laterality Date    COLONOSCOPY      ESOPHAGOGASTRODUODENOSCOPY W/ BANDING      Metal plate in pelvis      PARACENTESIS         Review of patient's allergies indicates:  No Known Allergies    Medications:  Medications Prior to Admission   Medication Sig    albuterol (PROVENTIL/VENTOLIN HFA) 90 mcg/actuation inhaler Inhale 2 puffs into the lungs every 6 (six) hours.    amitriptyline (ELAVIL) 25 MG tablet Take 25 mg by mouth every evening.    ciprofloxacin HCl (CIPRO) 500 MG tablet Take 1 tablet (500 mg total) by mouth once a week.    cyclobenzaprine (FLEXERIL) 10 MG tablet TAKE 1 TABLET BY MOUTH 3 (THREE) TIMES DAILY AS NEEDED FOR MUSCLE SPASMS (PAIN) FOR UP TO 10 DAYS.    furosemide (LASIX) 40 MG tablet Take 1 tablet (40 mg total) by mouth 2 (two) times daily.    lactulose 10 gram/15 ml (CHRONULAC) 10 gram/15 mL (15 mL) solution Take 15 mLs (10 g total) by mouth 3 (three) times daily.    metoprolol tartrate (LOPRESSOR) 25 MG tablet Take 1 tablet (25 mg total) by mouth 2 (two) times daily.    ondansetron (ZOFRAN) 4 MG tablet TAKE 1 TABLET BY MOUTH EVERY 8 (EIGHT) HOURS AS NEEDED FOR NAUSEA FORUP TO 7 DAYS.    ondansetron (ZOFRAN-ODT) 4 MG TbDL Take 1 tablet (4 mg total) by mouth every 8 (eight) hours as needed (nausea).    pantoprazole (PROTONIX) 40 MG tablet Take 1 tablet (40 mg total) by mouth 2 (two) times daily.    rifAXImin (XIFAXAN) 200 mg Tab TAKE 1 TABLET BY MOUTH 2 (TWO) TIMES DAILY  BEFORE MEALS FOR 14 DAYS.    spironolactone (ALDACTONE) 100 MG tablet Take 1 tablet (100 mg total) by mouth 2 (two) times daily.     Antibiotics (From admission, onward)    Start     Stop Route Frequency Ordered    06/09/20 0030  vancomycin 1.5 g in dextrose 5 % 250 mL IVPB (ready to mix)      -- IV Once 06/09/20 0023    06/07/20 2102  vancomycin - pharmacy to dose  (vancomycin with pharmacy to dose consult)      -- IV pharmacy to manage frequency 06/07/20 2003 06/07/20 1930  cefTRIAXone (ROCEPHIN) 2 g/50 mL D5W IVPB      -- IV Every 24 hours (non-standard times) 06/07/20 1828    06/07/20 1215  rifAXIMin tablet 550 mg      -- Oral 2 times daily 06/07/20 1204        Antifungals (From admission, onward)    None        Antivirals (From admission, onward)    None             There is no immunization history on file for this patient.    Family History     None        Social History     Socioeconomic History    Marital status: Significant Other     Spouse name: Not on file    Number of children: Not on file    Years of education: Not on file    Highest education level: Not on file   Occupational History    Not on file   Social Needs    Financial resource strain: Not on file    Food insecurity:     Worry: Not on file     Inability: Not on file    Transportation needs:     Medical: Not on file     Non-medical: Not on file   Tobacco Use    Smoking status: Current Every Day Smoker     Packs/day: 0.25     Years: 45.00     Pack years: 11.25     Types: Cigarettes    Smokeless tobacco: Never Used   Substance and Sexual Activity    Alcohol use: Not Currently    Drug use: Yes     Types: Cocaine    Sexual activity: Not on file   Lifestyle    Physical activity:     Days per week: Not on file     Minutes per session: Not on file    Stress: Not on file   Relationships    Social connections:     Talks on phone: Not on file     Gets together: Not on file     Attends [employer] service: Not on file     Active member of  club or organization: Not on file     Attends meetings of clubs or organizations: Not on file     Relationship status: Not on file   Other Topics Concern    Not on file   Social History Narrative    Not on file     Review of Systems   Unable to perform ROS: Acuity of condition     Objective:     Vital Signs (Most Recent):  Temp: 98.9 °F (37.2 °C) (06/08/20 2353)  Pulse: 86 (06/08/20 2353)  Resp: 18 (06/08/20 2353)  BP: (!) 111/58 (06/08/20 2353)  SpO2: (!) 94 % (06/08/20 2353) Vital Signs (24h Range):  Temp:  [98.5 °F (36.9 °C)-99 °F (37.2 °C)] 98.9 °F (37.2 °C)  Pulse:  [] 86  Resp:  [18] 18  SpO2:  [94 %-98 %] 94 %  BP: ()/(56-73) 111/58     Weight: 59 kg (130 lb)  Body mass index is 17.63 kg/m².    Estimated Creatinine Clearance: 27.5 mL/min (A) (based on SCr of 2 mg/dL (H)).    Physical Exam   Constitutional: She is oriented to person, place, and time. Vital signs are normal. She appears well-developed and well-nourished. She is cooperative. Sickly appearance: - No distress.   Thin, frail, CF resting comfortably in bed in no acute distress.   HENT:   Head: Normocephalic and atraumatic.   Eyes: Conjunctivae are normal.   PERRL   Neck: Neck supple. No JVD present.   Cardiovascular: Regular rhythm and normal heart sounds. Tachycardia present.   Pulmonary/Chest: Effort normal and breath sounds normal.   Comfortable on RA.   Abdominal: Soft. Bowel sounds are normal. She exhibits distension and ascites. There is tenderness.   + Ascites.  Normoactive BS.    Musculoskeletal: Normal range of motion.   Neurological: She is alert and oriented to person, place, and time.   Skin: Skin is warm and dry.   Blanchable redness to sacrum.    Psychiatric: Her speech is normal and behavior is normal. Judgment and thought content normal. Her affect is not blunt. She is not slowed, not withdrawn and not combative. Cognition and memory are normal. She exhibits a depressed mood.   Nursing note and vitals  reviewed.      Significant Labs:   Blood Culture:   Recent Labs   Lab 04/22/20  0055 05/14/20  0015 05/14/20  0023 06/06/20  2220 06/06/20  2236   LABBLOO No Growth after 4 days.  No Growth after 4 days.  No Growth after 4 days.  Gram stain sylwia bottle: Gram positive cocci in chains resembling Strep   Results called to and read back by:Renetta Paredes RN 06/07/2020  17:47  Gram stain aer bottle: Gram positive cocci in chains resembling Strep   06/07/2020  18:55  VIRIDANS STREPTOCOCCUS GROUP  Susceptibility testing not routinely performed  * No Growth to date  No Growth to date     BMP:   Recent Labs   Lab 06/08/20  0552   GLU 85      K 3.9      CO2 21*   BUN 31*   CREATININE 2.0*   CALCIUM 7.3*   MG 1.7     CBC:   Recent Labs   Lab 06/07/20  0530 06/08/20  0552   WBC 6.33 4.77   HGB 11.4* 8.4*   HCT 35.7* 26.9*   * 86*     All pertinent labs within the past 24 hours have been reviewed.    Significant Imaging: I have reviewed all pertinent imaging results/findings within the past 24 hours.

## 2020-06-09 NOTE — PLAN OF CARE
Spoke to Cheryl from [facility][1].  She has some concerns with patient being a drug addict.  She has concerns about safety of her staff that would provide care.  She will still consider accepting the patient if there is a clear plan for where patient will be discharged and who her care giver will be.         06/09/20 1714   Post-Acute Status   Post-Acute Authorization Hospice   Hospice Status Pending Clinical Review

## 2020-06-09 NOTE — SUBJECTIVE & OBJECTIVE
"Interval History: No acute events overnight.  Currently resting comfortably in bed in NAD.  Patient is s/p paracentesis removal of 2.3 L.  Fluid studies are consistent with SBP, cultures pending.  Currently on Rocephin and vancomycin (started on Sunday).  Will plan to repeat paracentesis on Thursday to check for improvement in counts.  ID consulted given bacteremia.  Repeat BC today pending.     Review of Systems   Constitutional: Positive for fatigue. Negative for chills, diaphoresis and fever.   HENT: Negative for facial swelling, hearing loss, mouth sores, sneezing, sore throat, tinnitus and trouble swallowing.    Eyes: Negative for photophobia, pain, discharge, redness and visual disturbance.   Respiratory: Negative for apnea, cough, choking, chest tightness, shortness of breath, wheezing and stridor.    Cardiovascular: Negative for chest pain, palpitations and leg swelling.   Gastrointestinal: Positive for abdominal distention and abdominal pain. Negative for anal bleeding, blood in stool, constipation, diarrhea, nausea, rectal pain and vomiting.   Endocrine: Negative for cold intolerance, heat intolerance, polydipsia, polyphagia and polyuria.   Genitourinary: Negative for difficulty urinating, dysuria, flank pain, frequency, hematuria, pelvic pain, urgency, vaginal bleeding, vaginal discharge and vaginal pain.   Musculoskeletal: Positive for arthralgias, back pain and myalgias. Negative for gait problem and neck stiffness.        C/o pain "all over".   Skin: Negative for pallor, rash and wound.   Allergic/Immunologic: Negative for food allergies.   Neurological: Positive for weakness. Negative for dizziness, tremors, seizures, syncope, speech difficulty and headaches.   Hematological: Negative for adenopathy. Does not bruise/bleed easily.   Psychiatric/Behavioral: Negative for behavioral problems and confusion. The patient is not nervous/anxious.    All other systems reviewed and are negative.    Objective: "     Vital Signs (Most Recent):  Temp: 98.5 °F (36.9 °C) (06/09/20 0721)  Pulse: 79 (06/09/20 0721)  Resp: 18 (06/09/20 0721)  BP: 131/62 (06/09/20 0721)  SpO2: 98 % (06/09/20 0721) Vital Signs (24h Range):  Temp:  [98.5 °F (36.9 °C)-99.2 °F (37.3 °C)] 98.5 °F (36.9 °C)  Pulse:  [] 79  Resp:  [18] 18  SpO2:  [94 %-98 %] 98 %  BP: (109-132)/(56-73) 131/62     Weight: 59 kg (130 lb)  Body mass index is 17.63 kg/m².    Intake/Output Summary (Last 24 hours) at 6/9/2020 1422  Last data filed at 6/9/2020 0537  Gross per 24 hour   Intake 430 ml   Output --   Net 430 ml      Physical Exam   Constitutional: She is oriented to person, place, and time. Vital signs are normal. She appears well-developed and well-nourished. She is cooperative. She has a sickly appearance. No distress.   Thin, frail, CF resting comfortably in bed in no acute distress.   HENT:   Head: Normocephalic and atraumatic.   Eyes: Conjunctivae are normal.   PERRL   Neck: Neck supple. No JVD present.   Cardiovascular: Regular rhythm and normal heart sounds. Tachycardia present.   Pulmonary/Chest: Effort normal and breath sounds normal.   Comfortable on RA.   Abdominal: Soft. Bowel sounds are normal. She exhibits distension and ascites. There is tenderness.   + Ascites.  Normoactive BS.  Mild, generalized tenderness.   Musculoskeletal: Normal range of motion.   Neurological: She is alert and oriented to person, place, and time.   Skin: Skin is warm and dry.   Blanchable redness to sacrum.    Psychiatric: Her speech is normal and behavior is normal. Judgment and thought content normal. Her affect is not blunt. She is not slowed, not withdrawn and not combative. Cognition and memory are normal. She exhibits a depressed mood.   Nursing note and vitals reviewed.      Significant Labs:   Blood Culture: No results for input(s): LABBLOO in the last 48 hours.  CBC:   Recent Labs   Lab 06/08/20  0552 06/09/20  0645   WBC 4.77 2.83*   HGB 8.4* 8.6*   HCT 26.9*  27.1*   PLT 86* 88*     CMP:   Recent Labs   Lab 06/07/20  1814 06/08/20  0552 06/09/20  0645    137 137   K 4.3 3.9 4.0    107 108   CO2 19* 21* 23   * 85 84   BUN 31* 31* 18   CREATININE 2.6* 2.0* 1.2   CALCIUM 7.7* 7.3* 7.3*   PROT 6.8 6.1 6.3   ALBUMIN 2.2* 2.0* 2.0*   BILITOT 1.3* 0.8 0.7   ALKPHOS 149* 132 152*   AST 39 32 34   ALT 24 22 21   ANIONGAP 12 9 6*   EGFRNONAA 19* 26* 49*     Coagulation:   Recent Labs   Lab 06/09/20  0645   INR 1.2       Significant Imaging: I have reviewed all pertinent imaging results/findings within the past 24 hours.

## 2020-06-09 NOTE — HPI
59 year old female with alcoholic cirrhosis with ascites, hepatitis C and ongoing substance abuse who presented to the ED with reports of AMS. She was recently  hospitalized from 6/1/20 to 6/2/20 . She was treated for hepatic encephalopathy.  Since admission, blood culture is now positive for strep viridans.  She had interval paracentesis-  Right lower quadrant paracentesis with removal of 2.75 L of ascitic fluid without difficulty..   Ascitic fluid-    Component      Latest Ref Rng & Units 6/8/2020 6/5/2020   WBC, Body Fluid      /cu mm 904 69   Segs, Fluid      % 76 6   Lymphs, Fluid      %  22   Monocytes/Macrophages, Fluid      % 24 72     She is non verbal.

## 2020-06-09 NOTE — ASSESSMENT & PLAN NOTE
- Continue Lactulose and Rifaximin  - s/p paracentesis with removal of 2.3L  - Fluid studies consistent with SBP   - Currently on Rocephin and Vancomycin given Strep bacteremia  - Resume home diuretics  - Daily labs  - Patient wishes to pursue Hospice care upon DC; Case d/w GI, who agrees with Hospice care after resolution of acute issues  - CM following for DC planning    MELD-Na score: 10 at 6/9/2020  6:45 AM  MELD score: 10 at 6/9/2020  6:45 AM  Calculated from:  Serum Creatinine: 1.2 mg/dL at 6/9/2020  6:45 AM  Serum Sodium: 137 mmol/L at 6/9/2020  6:45 AM  Total Bilirubin: 0.7 mg/dL (Rounded to 1 mg/dL) at 6/9/2020  6:45 AM  INR(ratio): 1.2 at 6/9/2020  6:45 AM  Age: 59 years

## 2020-06-09 NOTE — PLAN OF CARE
Patient remains free from falls and injuries. Pain managed with IV pain medication. Patient repositions self every 2 hours. Patient oriented x4. IV antibiotics provided. Will continue to monitor.

## 2020-06-09 NOTE — ASSESSMENT & PLAN NOTE
Blood culture - is positive for viridans strep group .  Will continue Rocephin .  Will follow up with repeat culture.

## 2020-06-09 NOTE — PROGRESS NOTES
Pharmacokinetic Assessment Follow Up: IV Vancomycin    Vancomycin serum concentration assessment(s):    The random level was drawn correctly and can be used to guide therapy at this time. The measurement is below the desired definitive target range of 15 to 20 mcg/mL.    Vancomycin Regimen Plan:  Will administer 1500mg dose    Re-dose when the random level is less than 20 mcg/mL, next level to be drawn at 00:30 on 6/10    Drug levels (last 3 results):  Recent Labs   Lab Result Units 06/08/20  2201   Vancomycin, Random ug/mL 6.5       Pharmacy will continue to follow and monitor vancomycin.    Please contact pharmacy at extension [phone] for questions regarding this assessment.    Thank you for the consult,   Yonathan Jimenes       Patient brief summary:  Jossy Siegel is a 59 y.o. female initiated on antimicrobial therapy with IV Vancomycin for treatment of bacteremia    The patient's current regimen is pulse dosing    Drug Allergies:   Review of patient's allergies indicates:  No Known Allergies    Actual Body Weight:   59kg    Renal Function:   Estimated Creatinine Clearance: 27.5 mL/min (A) (based on SCr of 2 mg/dL (H)).,     Dialysis Method (if applicable):  N/A    CBC (last 72 hours):  Recent Labs   Lab Result Units 06/06/20 2207 06/07/20 0530 06/08/20  0552   WBC K/uL 3.52* 6.33 4.77   Hemoglobin g/dL 10.7* 11.4* 8.4*   Hematocrit % 32.1* 35.7* 26.9*   Platelets K/uL 112* 121* 86*   Gran% % 64.0 79.2* 64.2   Lymph% % 16.2* 9.0* 18.2   Mono% % 15.3* 9.5 13.0   Eosinophil% % 2.8 1.1 2.9   Basophil% % 0.6 0.6 0.4   Differential Method  Automated Automated Automated       Metabolic Panel (last 72 hours):  Recent Labs   Lab Result Units 06/06/20 2207 06/06/20 2226 06/07/20  0530 06/07/20  1814 06/08/20  0552   Sodium mmol/L 140  --  143 139 137   Potassium mmol/L 4.3  --  5.2* 4.3 3.9   Chloride mmol/L 106  --  109 108 107   CO2 mmol/L 21*  --  24 19* 21*   Glucose mg/dL 92  --  93 149* 85   Glucose,  UA   --  Negative  --   --   --    BUN, Bld mg/dL 22  --  27* 31* 31*   Creatinine mg/dL 2.3*  --  2.3* 2.6* 2.0*   Creatinine, Random Ur mg/dL  --  49.3  --   --   --    Albumin g/dL 2.4*  --  2.3* 2.2* 2.0*   Total Bilirubin mg/dL 1.1*  --  2.5* 1.3* 0.8   Alkaline Phosphatase U/L 181*  --  161* 149* 132   AST U/L 44*  --  45* 39 32   ALT U/L 27  --  28 24 22   Magnesium mg/dL  --   --  1.8  --  1.7   Phosphorus mg/dL  --   --  5.1*  --  4.2       Vancomycin Administrations:  vancomycin given in the last 96 hours                   vancomycin 1.5 g in dextrose 5 % 250 mL IVPB (ready to mix) (mg) 1,500 mg New Bag 06/09/20 0042    vancomycin in dextrose 5 % 1 gram/250 mL IVPB 1,000 mg (mg) 1,000 mg New Bag 06/07/20 2123                Microbiologic Results:  Microbiology Results (last 7 days)     Procedure Component Value Units Date/Time    (rule out SBP) Aerobic culture [[id number]] Collected:  06/08/20 1540    Order Status:  Sent Specimen:  Body Fluid from Peritoneal Fluid Updated:  06/08/20 1731    (rule out SBP) Culture, Anaerobic [[id number]] Collected:  06/08/20 1540    Order Status:  Sent Specimen:  Body Fluid from Peritoneal Fluid Updated:  06/08/20 1731    (rule out SBP) Gram stain [[id number]] Collected:  06/08/20 1540    Order Status:  Sent Specimen:  Body Fluid from Peritoneal Fluid Updated:  06/08/20 1730    Stool culture [[id number]]     Order Status:  No result Specimen:  Stool     Blood culture #1 **CANNOT BE ORDERED STAT** [[id number]]  (Abnormal) Collected:  06/06/20 2220    Order Status:  Completed Specimen:  Blood from Peripheral, Hand, Right Updated:  06/08/20 1117     Blood Culture, Routine Gram stain sylwia bottle: Gram positive cocci in chains resembling Strep       Results called to and read back by:Renetta Paredes RN 06/07/2020  17:47      Gram stain aer bottle: Gram positive cocci in chains resembling Strep       06/07/2020  18:55      VIRIDANS STREPTOCOCCUS GROUP  Susceptibility testing not routinely  performed      Blood culture #2 **CANNOT BE ORDERED STAT** [[id number]] Collected:  06/06/20 2236    Order Status:  Completed Specimen:  Blood from Peripheral, Hand, Right Updated:  06/08/20 0612     Blood Culture, Routine No Growth to date      No Growth to date

## 2020-06-09 NOTE — ASSESSMENT & PLAN NOTE
Component      Latest Ref Rng & Units 6/8/2020 6/5/2020   WBC, Body Fluid      /cu mm 904 69   Segs, Fluid      % 76 6   Lymphs, Fluid      %  22   Monocytes/Macrophages, Fluid      % 24 72     Will continue Rocephin.stop Vancomycin

## 2020-06-09 NOTE — PROGRESS NOTES
[facility] Hospital Medicine  Progress Note    Patient Name: Jossy Siegel  MRN: [id number]  Patient Class: IP- Inpatient   Admission Date: 6/6/2020  Length of Stay: 2 days  Attending Physician: David Nieto MD  Primary Care Provider: Primary Doctor No        Subjective:     Principal Problem:Acute hepatic encephalopathy        HPI:  Jossy Siegel is a 59 year old female with alcoholic cirrhosis with ascites, hepatitis C and ongoing substance abuse who presented to the ED with reports of AMS. The patient was hospitalized from 6/1/20 to 6/2/20 with similar complaints. She was treated for hepatic encephalopathy. On 6/5/20, she had an outpatient paracentesis on with removal of 3.5 liters of fluid. The patient is unable to provide any additional history at this time. Labs in the ED were significant for an ammonia level of 129, WBC count of 3,520, hemoglobin of 10.7, platelet count of 112,000, creatinine of 2.3 which is elevated from her baseline of 1, albumin of 2.4, total bilirubin of 1.1 and INR of 1.3. Urine drug screen was positive for cocaine and amphetamines.     Overview/Hospital Course:  On 6/6 Patient was admitted to the Medicine unit secondary to Hepatic Encephalopathy and KJ (Cr elevated to 2.3) for which she was given a Lactulose enema in the ER and then transitioned to oral Lactulose once more awake.  Also started on gentle IVFs for KJ.  Ammonia on admit elevated to 139.  Of note, patient has had multiple recent admissions for the same and continues to use Cocaine and Amphetamines, despite numerous conversations regarding the need for cessation.      As of 6/7 Patient is now AAOx3, and conversing appropriately.  Ammonia improved to 38 this morning.  Cr remains elevated at 2.3.  Will plan to continue gentle IVFs overnight and anticipate DC home in AM if renal function improves.  Detailed conversation held again today, for >15 minutes regarding the complete need for illicit  drug cessation; patient does not appear interested in stopping at this time.      As of 6/8 Patient noted to have increased abdominal distention yesterday evening for which a KUB and CT were ordered.  CT showed there is fluid throughout the nondistended colon.  The ascending colon appears to be thick walled, similar to the comparison study. There is some fluid in nondistended distal small bowel.  Contrast fills the proximal small bowel.  There are no dilated loops of bowel or free air noted.  Findings most likely represent colitis, gastroenteritis or diarrheal disease.  Negative for acute process otherwise.  Preliminary BC from 6/6 show Viridans strep.  Case d/w ID and patient was started on Rocephin and Vancomycin.  Repeat BC ordered for the AM.  ECHO pending.  To undergo paracentesis today.  Per detailed d/w patient yesterday, she is now a DNR and would like to pursue hospice placement upon DC.  CM following for DC planning.  Case d/w GI today as patient is well known to Dr. Green.  GI agrees with hospice placement after treatment of acute medical issues.  Will await further recs from ID and use cultures to guide ABX therapy.  Patient remains AAOx3., with resolution of hepatic encephalopathy.    As of 6/9 Patient is s/p paracentesis removal of 2.3 L.  Fluid studies are consistent with SBP, cultures pending.  Currently on Rocephin and vancomycin (started on Sunday).  Will plan to repeat paracentesis on Thursday to check for improvement in counts.  ID consulted given bacteremia.  Repeat BC today pending.      Interval History: No acute events overnight.  Currently resting comfortably in bed in NAD.  Patient is s/p paracentesis removal of 2.3 L.  Fluid studies are consistent with SBP, cultures pending.  Currently on Rocephin and vancomycin (started on Sunday).  Will plan to repeat paracentesis on Thursday to check for improvement in counts.  ID consulted given bacteremia.  Repeat BC today pending.     Review of  "Systems   Constitutional: Positive for fatigue. Negative for chills, diaphoresis and fever.   HENT: Negative for facial swelling, hearing loss, mouth sores, sneezing, sore throat, tinnitus and trouble swallowing.    Eyes: Negative for photophobia, pain, discharge, redness and visual disturbance.   Respiratory: Negative for apnea, cough, choking, chest tightness, shortness of breath, wheezing and stridor.    Cardiovascular: Negative for chest pain, palpitations and leg swelling.   Gastrointestinal: Positive for abdominal distention and abdominal pain. Negative for anal bleeding, blood in stool, constipation, diarrhea, nausea, rectal pain and vomiting.   Endocrine: Negative for cold intolerance, heat intolerance, polydipsia, polyphagia and polyuria.   Genitourinary: Negative for difficulty urinating, dysuria, flank pain, frequency, hematuria, pelvic pain, urgency, vaginal bleeding, vaginal discharge and vaginal pain.   Musculoskeletal: Positive for arthralgias, back pain and myalgias. Negative for gait problem and neck stiffness.        C/o pain "all over".   Skin: Negative for pallor, rash and wound.   Allergic/Immunologic: Negative for food allergies.   Neurological: Positive for weakness. Negative for dizziness, tremors, seizures, syncope, speech difficulty and headaches.   Hematological: Negative for adenopathy. Does not bruise/bleed easily.   Psychiatric/Behavioral: Negative for behavioral problems and confusion. The patient is not nervous/anxious.    All other systems reviewed and are negative.    Objective:     Vital Signs (Most Recent):  Temp: 98.5 °F (36.9 °C) (06/09/20 0721)  Pulse: 79 (06/09/20 0721)  Resp: 18 (06/09/20 0721)  BP: 131/62 (06/09/20 0721)  SpO2: 98 % (06/09/20 0721) Vital Signs (24h Range):  Temp:  [98.5 °F (36.9 °C)-99.2 °F (37.3 °C)] 98.5 °F (36.9 °C)  Pulse:  [] 79  Resp:  [18] 18  SpO2:  [94 %-98 %] 98 %  BP: (109-132)/(56-73) 131/62     Weight: 59 kg (130 lb)  Body mass index is " 17.63 kg/m².    Intake/Output Summary (Last 24 hours) at 6/9/2020 1422  Last data filed at 6/9/2020 0537  Gross per 24 hour   Intake 430 ml   Output --   Net 430 ml      Physical Exam   Constitutional: She is oriented to person, place, and time. Vital signs are normal. She appears well-developed and well-nourished. She is cooperative. She has a sickly appearance. No distress.   Thin, frail, CF resting comfortably in bed in no acute distress.   HENT:   Head: Normocephalic and atraumatic.   Eyes: Conjunctivae are normal.   PERRL   Neck: Neck supple. No JVD present.   Cardiovascular: Regular rate and rhythm and normal heart sounds.    Pulmonary/Chest: Effort normal and breath sounds normal.   Comfortable on RA.   Abdominal: Soft. Bowel sounds are normal. She exhibits distension and ascites. There is tenderness.   + Ascites.  Normoactive BS.  Mild, generalized tenderness.   Musculoskeletal: Normal range of motion.   Neurological: She is alert and oriented to person, place, and time.   Skin: Skin is warm and dry.   Blanchable redness to sacrum.    Psychiatric: Her speech is normal and behavior is normal. Judgment and thought content normal. Her affect is not blunt. She is not slowed, not withdrawn and not combative. Cognition and memory are normal. She exhibits a depressed mood.   Nursing note and vitals reviewed.      Significant Labs:   Blood Culture: No results for input(s): LABBLOO in the last 48 hours.  CBC:   Recent Labs   Lab 06/08/20  0552 06/09/20  0645   WBC 4.77 2.83*   HGB 8.4* 8.6*   HCT 26.9* 27.1*   PLT 86* 88*     CMP:   Recent Labs   Lab 06/07/20  1814 06/08/20  0552 06/09/20  0645    137 137   K 4.3 3.9 4.0    107 108   CO2 19* 21* 23   * 85 84   BUN 31* 31* 18   CREATININE 2.6* 2.0* 1.2   CALCIUM 7.7* 7.3* 7.3*   PROT 6.8 6.1 6.3   ALBUMIN 2.2* 2.0* 2.0*   BILITOT 1.3* 0.8 0.7   ALKPHOS 149* 132 152*   AST 39 32 34   ALT 24 22 21   ANIONGAP 12 9 6*   EGFRNONAA 19* 26* 49*      Coagulation:   Recent Labs   Lab 06/09/20  0645   INR 1.2       Significant Imaging: I have reviewed all pertinent imaging results/findings within the past 24 hours.      Assessment/Plan:      * Acute hepatic encephalopathy  - Now resolved  - Continue Lactulose TID   - Continue Rifaximin  - Neuro checks q 4 hours  - Monitor       Cirrhosis of liver with ascites related to hepatitis C virus  - Continue Lactulose and Rifaximin  - s/p paracentesis with removal of 2.3L  - Fluid studies consistent with SBP   - Currently on Rocephin and Vancomycin given Strep bacteremia  - Resume home diuretics  - Daily labs  - Patient wishes to pursue Hospice care upon DC; Case d/w GI, who agrees with Hospice care after resolution of acute issues  - CM following for DC planning    MELD-Na score: 10 at 6/9/2020  6:45 AM  MELD score: 10 at 6/9/2020  6:45 AM  Calculated from:  Serum Creatinine: 1.2 mg/dL at 6/9/2020  6:45 AM  Serum Sodium: 137 mmol/L at 6/9/2020  6:45 AM  Total Bilirubin: 0.7 mg/dL (Rounded to 1 mg/dL) at 6/9/2020  6:45 AM  INR(ratio): 1.2 at 6/9/2020  6:45 AM  Age: 59 years      Bacteremia  - Preliminary BC from 6/6 show viridans strep  - ID consulted; currently on Rocephin and Vancomycin for now with additional recs pending  - ECHO pending  - Repeat BC pending      SBP (spontaneous bacterial peritonitis)  - s/p paracentesis on 6/8 with removal of 2.3L  - Fluid studies consistent with SBP  - Fluid cx with no growth thus far  - Continue Rocephin and Vanc  - Plan to repeat paracentesis Thursday       Abnormal CT of the abdomen  - CT abdomen showed findings most likely represent colitis, gastroenteritis or diarrheal disease.  Negative for acute process otherwise  - Remains afebrile with no leukocytosis  - Denies N/V  - On Lactulose, so patient with frequent stools  - Stool Cx, ova/parasites pending  - Continue Rocephin and Vancomycin  - Will use cultures to guide ABX therapy  - Monitor     Polysubstance abuse  - Counseled  extensively on the need for complete illicit drug cessation, patient verbalizes understanding and expresses desire to quit, however she has had multiple admissions over the past several months due to noncompliance with home medications and continued ongoing illicit drug use    Pancytopenia  - Counts appears stable  - Monitor with daily CBC      Tobacco abuse  - Counseled on cessation      Chronic bilateral low back pain without sciatica  - PRN analgesia        Severe malnutrition  - Dietician consult pending for any additional recommendations    VTE Risk Mitigation (From admission, onward)         Ordered     Place sequential compression device  Until discontinued      06/09/20 7080                      Ariadna Alegre DNP, ACNP-BC  Department of Hospital Medicine   [facility]

## 2020-06-10 LAB
ALBUMIN SERPL BCP-MCNC: 2.3 G/DL (ref 3.5–5.2)
ALP SERPL-CCNC: 163 U/L (ref 55–135)
ALT SERPL W/O P-5'-P-CCNC: 25 U/L (ref 10–44)
ANION GAP SERPL CALC-SCNC: 12 MMOL/L (ref 8–16)
AST SERPL-CCNC: 43 U/L (ref 10–40)
BACTERIA FLD AEROBE CULT: NO GROWTH
BASOPHILS # BLD AUTO: 0.03 K/UL (ref 0–0.2)
BASOPHILS NFR BLD: 0.5 % (ref 0–1.9)
BILIRUB SERPL-MCNC: 1 MG/DL (ref 0.1–1)
BUN SERPL-MCNC: 13 MG/DL (ref 8–23)
CALCIUM SERPL-MCNC: 7.8 MG/DL (ref 8.7–10.5)
CHLORIDE SERPL-SCNC: 104 MMOL/L (ref 95–110)
CO2 SERPL-SCNC: 20 MMOL/L (ref 23–29)
CREAT SERPL-MCNC: 1 MG/DL (ref 0.5–1.4)
DIFFERENTIAL METHOD: ABNORMAL
EOSINOPHIL # BLD AUTO: 0.2 K/UL (ref 0–0.5)
EOSINOPHIL NFR BLD: 2.3 % (ref 0–8)
ERYTHROCYTE [DISTWIDTH] IN BLOOD BY AUTOMATED COUNT: 16.1 % (ref 11.5–14.5)
EST. GFR  (AFRICAN AMERICAN): >60 ML/MIN/1.73 M^2
EST. GFR  (NON AFRICAN AMERICAN): >60 ML/MIN/1.73 M^2
GLUCOSE SERPL-MCNC: 117 MG/DL (ref 70–110)
GRAM STN SPEC: NORMAL
GRAM STN SPEC: NORMAL
HCT VFR BLD AUTO: 36.6 % (ref 37–48.5)
HGB BLD-MCNC: 11.4 G/DL (ref 12–16)
IMM GRANULOCYTES # BLD AUTO: 0.07 K/UL (ref 0–0.04)
IMM GRANULOCYTES NFR BLD AUTO: 1.1 % (ref 0–0.5)
INR PPP: 1.2 (ref 0.8–1.2)
LYMPHOCYTES # BLD AUTO: 0.5 K/UL (ref 1–4.8)
LYMPHOCYTES NFR BLD: 7 % (ref 18–48)
MAGNESIUM SERPL-MCNC: 1.7 MG/DL (ref 1.6–2.6)
MCH RBC QN AUTO: 32.7 PG (ref 27–31)
MCHC RBC AUTO-ENTMCNC: 31.1 G/DL (ref 32–36)
MCV RBC AUTO: 105 FL (ref 82–98)
MONOCYTES # BLD AUTO: 0.5 K/UL (ref 0.3–1)
MONOCYTES NFR BLD: 7.6 % (ref 4–15)
NEUTROPHILS # BLD AUTO: 5.3 K/UL (ref 1.8–7.7)
NEUTROPHILS NFR BLD: 81.5 % (ref 38–73)
NRBC BLD-RTO: 0 /100 WBC
PHOSPHATE SERPL-MCNC: 2.9 MG/DL (ref 2.7–4.5)
PLATELET # BLD AUTO: 91 K/UL (ref 150–350)
PMV BLD AUTO: 13.1 FL (ref 9.2–12.9)
POTASSIUM SERPL-SCNC: 4.6 MMOL/L (ref 3.5–5.1)
PROT SERPL-MCNC: 7.6 G/DL (ref 6–8.4)
PROTHROMBIN TIME: 12.2 SEC (ref 9–12.5)
RBC # BLD AUTO: 3.49 M/UL (ref 4–5.4)
SODIUM SERPL-SCNC: 136 MMOL/L (ref 136–145)
WBC # BLD AUTO: 6.55 K/UL (ref 3.9–12.7)

## 2020-06-10 PROCEDURE — 80053 COMPREHEN METABOLIC PANEL: CPT

## 2020-06-10 PROCEDURE — 36415 COLL VENOUS BLD VENIPUNCTURE: CPT

## 2020-06-10 PROCEDURE — 85025 COMPLETE CBC W/AUTO DIFF WBC: CPT

## 2020-06-10 PROCEDURE — 83735 ASSAY OF MAGNESIUM: CPT

## 2020-06-10 PROCEDURE — 85610 PROTHROMBIN TIME: CPT

## 2020-06-10 PROCEDURE — 21400001 HC TELEMETRY ROOM

## 2020-06-10 PROCEDURE — 63600175 PHARM REV CODE 636 W HCPCS: Performed by: NURSE PRACTITIONER

## 2020-06-10 PROCEDURE — 84100 ASSAY OF PHOSPHORUS: CPT

## 2020-06-10 PROCEDURE — 25000003 PHARM REV CODE 250: Performed by: NURSE PRACTITIONER

## 2020-06-10 RX ADMIN — LACTULOSE 15 G: 20 SOLUTION ORAL at 09:06

## 2020-06-10 RX ADMIN — MORPHINE SULFATE 1 MG: 2 INJECTION, SOLUTION INTRAMUSCULAR; INTRAVENOUS at 06:06

## 2020-06-10 RX ADMIN — RIFAXIMIN 550 MG: 550 TABLET ORAL at 09:06

## 2020-06-10 RX ADMIN — MORPHINE SULFATE 1 MG: 2 INJECTION, SOLUTION INTRAMUSCULAR; INTRAVENOUS at 10:06

## 2020-06-10 RX ADMIN — FUROSEMIDE 40 MG: 40 TABLET ORAL at 09:06

## 2020-06-10 RX ADMIN — MORPHINE SULFATE 1 MG: 2 INJECTION, SOLUTION INTRAMUSCULAR; INTRAVENOUS at 04:06

## 2020-06-10 RX ADMIN — SPIRONOLACTONE 100 MG: 25 TABLET ORAL at 09:06

## 2020-06-10 RX ADMIN — PANTOPRAZOLE SODIUM 40 MG: 40 TABLET, DELAYED RELEASE ORAL at 09:06

## 2020-06-10 RX ADMIN — MORPHINE SULFATE 1 MG: 2 INJECTION, SOLUTION INTRAMUSCULAR; INTRAVENOUS at 09:06

## 2020-06-10 RX ADMIN — AMITRIPTYLINE HYDROCHLORIDE 25 MG: 25 TABLET, FILM COATED ORAL at 09:06

## 2020-06-10 RX ADMIN — LACTULOSE 15 G: 20 SOLUTION ORAL at 03:06

## 2020-06-10 RX ADMIN — FUROSEMIDE 40 MG: 40 TABLET ORAL at 06:06

## 2020-06-10 RX ADMIN — METOPROLOL TARTRATE 25 MG: 25 TABLET ORAL at 09:06

## 2020-06-10 RX ADMIN — CEFTRIAXONE 2 G: 2 INJECTION, SOLUTION INTRAVENOUS at 06:06

## 2020-06-10 NOTE — ASSESSMENT & PLAN NOTE
- CT abdomen showed findings most likely represent colitis, gastroenteritis or diarrheal disease.  Negative for acute process otherwise  - Remains afebrile with no leukocytosis  - Denies N/V  - On Lactulose, so patient with frequent stools  - Stool Cx, ova/parasites pending  - Continue Rocephin   - Will use cultures to guide ABX therapy  - Monitor

## 2020-06-10 NOTE — SUBJECTIVE & OBJECTIVE
"Interval History:  No acute events overnight.  Patient currently resting in bed in no acute distress but states "I just do not feel good today".  Patient noted to have marked increase in abdominal distention today.  Repeat paracentesis ordered.  Peritoneal fluid from 06/8 shows NGTD.  Repeat blood cultures on 6/9 show no growth to date.  Patient remains afebrile with no leukocytosis.  Will discuss antibiotic plan with ID in preparation for DC planning.      Review of Systems   Constitutional: Positive for appetite change and fatigue. Negative for chills, diaphoresis and fever.   HENT: Negative for facial swelling, hearing loss, mouth sores, sneezing, sore throat, tinnitus and trouble swallowing.    Eyes: Negative for photophobia, pain, discharge, redness and visual disturbance.   Respiratory: Negative for apnea, cough, choking, chest tightness, shortness of breath, wheezing and stridor.    Cardiovascular: Negative for chest pain, palpitations and leg swelling.   Gastrointestinal: Positive for abdominal distention and abdominal pain. Negative for anal bleeding, blood in stool, constipation, diarrhea, nausea, rectal pain and vomiting.   Endocrine: Negative for cold intolerance, heat intolerance, polydipsia, polyphagia and polyuria.   Genitourinary: Negative for difficulty urinating, dysuria, flank pain, frequency, hematuria, pelvic pain, urgency, vaginal bleeding, vaginal discharge and vaginal pain.   Musculoskeletal: Positive for arthralgias, back pain and myalgias. Negative for gait problem and neck stiffness.        C/o pain "all over".   Skin: Negative for pallor, rash and wound.   Allergic/Immunologic: Negative for food allergies.   Neurological: Positive for weakness. Negative for dizziness, tremors, seizures, syncope, speech difficulty and headaches.   Hematological: Negative for adenopathy. Does not bruise/bleed easily.   Psychiatric/Behavioral: Negative for behavioral problems and confusion. The patient is " not nervous/anxious.    All other systems reviewed and are negative.    Objective:     Vital Signs (Most Recent):  Temp: 98.8 °F (37.1 °C) (06/10/20 1251)  Pulse: (!) 112 (06/10/20 1251)  Resp: 20 (06/10/20 1251)  BP: 108/67 (06/10/20 1251)  SpO2: 96 % (06/10/20 1251) Vital Signs (24h Range):  Temp:  [97.6 °F (36.4 °C)-99.5 °F (37.5 °C)] 98.8 °F (37.1 °C)  Pulse:  [] 112  Resp:  [18-20] 20  SpO2:  [95 %-99 %] 96 %  BP: (101-141)/(55-83) 108/67     Weight: 59 kg (130 lb)  Body mass index is 17.63 kg/m².    Intake/Output Summary (Last 24 hours) at 6/10/2020 1516  Last data filed at 6/10/2020 1300  Gross per 24 hour   Intake 170 ml   Output --   Net 170 ml      Physical Exam   Constitutional: She is oriented to person, place, and time. Vital signs are normal. She appears well-developed and well-nourished. She is cooperative. She has a sickly appearance. No distress.   Thin, frail, CF resting comfortably in bed in no acute distress.   HENT:   Head: Normocephalic and atraumatic.   Eyes: Conjunctivae are normal.   PERRL   Neck: Neck supple. No JVD present.   Cardiovascular: Regular rhythm and normal heart sounds. Tachycardia present.   Pulmonary/Chest: Effort normal and breath sounds normal.   Comfortable on RA.   Abdominal: Soft. Bowel sounds are normal. She exhibits distension and ascites. There is tenderness.   + Ascites.  Normoactive BS.  Mild, generalized tenderness.   Musculoskeletal: Normal range of motion.   Neurological: She is alert and oriented to person, place, and time.   Skin: Skin is warm and dry.   Blanchable redness to sacrum.    Psychiatric: Her speech is normal and behavior is normal. Judgment and thought content normal. Her affect is not blunt. She is not slowed, not withdrawn and not combative. Cognition and memory are normal. She exhibits a depressed mood.   Nursing note and vitals reviewed.      Significant Labs:   CBC:   Recent Labs   Lab 06/09/20  0645 06/10/20  0626   WBC 2.83* 6.55   HGB  8.6* 11.4*   HCT 27.1* 36.6*   PLT 88* 91*     CMP:   Recent Labs   Lab 06/09/20  0645 06/10/20  0626    136   K 4.0 4.6    104   CO2 23 20*   GLU 84 117*   BUN 18 13   CREATININE 1.2 1.0   CALCIUM 7.3* 7.8*   PROT 6.3 7.6   ALBUMIN 2.0* 2.3*   BILITOT 0.7 1.0   ALKPHOS 152* 163*   AST 34 43*   ALT 21 25   ANIONGAP 6* 12   EGFRNONAA 49* >60     Coagulation:   Recent Labs   Lab 06/10/20  0626   INR 1.2       Significant Imaging: I have reviewed all pertinent imaging results/findings within the past 24 hours.

## 2020-06-10 NOTE — ASSESSMENT & PLAN NOTE
- s/p paracentesis on 6/8 with removal of 2.3L  - Fluid studies consistent with SBP  - Fluid cx with no growth thus far  - Continue Rocephin  - Repeat paracentesis pending

## 2020-06-10 NOTE — ASSESSMENT & PLAN NOTE
- Preliminary BC from 6/6 show viridans strep  - ID consulted; currently on Rocephin and Vancomycin for now with additional recs pending  - ECHO negative for vegetation  - Repeat BC show NGTD  - Will discuss antibiotic plan with ID in preparation for DC planning.

## 2020-06-10 NOTE — PROGRESS NOTES
[facility] Hospital Medicine  Progress Note    Patient Name: Jossy Siegel  MRN: [id number]  Patient Class: IP- Inpatient   Admission Date: 6/6/2020  Length of Stay: 3 days  Attending Physician: David Nieto MD  Primary Care Provider: Primary Doctor No        Subjective:     Principal Problem:Acute hepatic encephalopathy        HPI:  Jossy Siegel is a 59 year old female with alcoholic cirrhosis with ascites, hepatitis C and ongoing substance abuse who presented to the ED with reports of AMS. The patient was hospitalized from 6/1/20 to 6/2/20 with similar complaints. She was treated for hepatic encephalopathy. On 6/5/20, she had an outpatient paracentesis on with removal of 3.5 liters of fluid. The patient is unable to provide any additional history at this time. Labs in the ED were significant for an ammonia level of 129, WBC count of 3,520, hemoglobin of 10.7, platelet count of 112,000, creatinine of 2.3 which is elevated from her baseline of 1, albumin of 2.4, total bilirubin of 1.1 and INR of 1.3. Urine drug screen was positive for cocaine and amphetamines.     Overview/Hospital Course:  On 6/6 Patient was admitted to the Medicine unit secondary to Hepatic Encephalopathy and KJ (Cr elevated to 2.3) for which she was given a Lactulose enema in the ER and then transitioned to oral Lactulose once more awake.  Also started on gentle IVFs for KJ.  Ammonia on admit elevated to 139.  Of note, patient has had multiple recent admissions for the same and continues to use Cocaine and Amphetamines, despite numerous conversations regarding the need for cessation.      As of 6/7 Patient is now AAOx3, and conversing appropriately.  Ammonia improved to 38 this morning.  Cr remains elevated at 2.3.  Will plan to continue gentle IVFs overnight and anticipate DC home in AM if renal function improves.  Detailed conversation held again today, for >15 minutes regarding the complete need for illicit  drug cessation; patient does not appear interested in stopping at this time.      As of 6/8 Patient noted to have increased abdominal distention yesterday evening for which a KUB and CT were ordered.  CT showed there is fluid throughout the nondistended colon.  The ascending colon appears to be thick walled, similar to the comparison study. There is some fluid in nondistended distal small bowel.  Contrast fills the proximal small bowel.  There are no dilated loops of bowel or free air noted.  Findings most likely represent colitis, gastroenteritis or diarrheal disease.  Negative for acute process otherwise.  Preliminary BC from 6/6 show Viridans strep.  Case d/w ID and patient was started on Rocephin and Vancomycin.  Repeat BC ordered for the AM.  ECHO pending.  To undergo paracentesis today.  Per detailed d/w patient yesterday, she is now a DNR and would like to pursue hospice placement upon DC.  CM following for DC planning.  Case d/w GI today as patient is well known to Dr. Green.  GI agrees with hospice placement after treatment of acute medical issues.  Will await further recs from ID and use cultures to guide ABX therapy.  Patient remains AAOx3., with resolution of hepatic encephalopathy.    As of 6/9 Patient is s/p paracentesis removal of 2.3 L.  Fluid studies are consistent with SBP, cultures pending.  Currently on Rocephin and vancomycin (started on Sunday).  Will plan to repeat paracentesis on Thursday to check for improvement in counts.  ID consulted given bacteremia.  Repeat BC today pending.      As of 6/10 Patient noted to have marked increase in abdominal distention today.  Repeat paracentesis ordered.  Peritoneal fluid from 06/8 shows NGTD.  Repeat blood cultures on 6/9 show no growth to date.  Patient remains afebrile with no leukocytosis.  Will discuss antibiotic plan with ID in preparation for DC planning.      Interval History:  No acute events overnight.  Patient currently resting in bed in no  "acute distress but states "I just do not feel good today".  Patient noted to have marked increase in abdominal distention today.  Repeat paracentesis ordered.  Peritoneal fluid from 06/8 shows NGTD.  Repeat blood cultures on 6/9 show no growth to date.  Patient remains afebrile with no leukocytosis.  Will discuss antibiotic plan with ID in preparation for DC planning.      Review of Systems   Constitutional: Positive for appetite change and fatigue. Negative for chills, diaphoresis and fever.   HENT: Negative for facial swelling, hearing loss, mouth sores, sneezing, sore throat, tinnitus and trouble swallowing.    Eyes: Negative for photophobia, pain, discharge, redness and visual disturbance.   Respiratory: Negative for apnea, cough, choking, chest tightness, shortness of breath, wheezing and stridor.    Cardiovascular: Negative for chest pain, palpitations and leg swelling.   Gastrointestinal: Positive for abdominal distention and abdominal pain. Negative for anal bleeding, blood in stool, constipation, diarrhea, nausea, rectal pain and vomiting.   Endocrine: Negative for cold intolerance, heat intolerance, polydipsia, polyphagia and polyuria.   Genitourinary: Negative for difficulty urinating, dysuria, flank pain, frequency, hematuria, pelvic pain, urgency, vaginal bleeding, vaginal discharge and vaginal pain.   Musculoskeletal: Positive for arthralgias, back pain and myalgias. Negative for gait problem and neck stiffness.        C/o pain "all over".   Skin: Negative for pallor, rash and wound.   Allergic/Immunologic: Negative for food allergies.   Neurological: Positive for weakness. Negative for dizziness, tremors, seizures, syncope, speech difficulty and headaches.   Hematological: Negative for adenopathy. Does not bruise/bleed easily.   Psychiatric/Behavioral: Negative for behavioral problems and confusion. The patient is not nervous/anxious.    All other systems reviewed and are negative.    Objective: "     Vital Signs (Most Recent):  Temp: 98.8 °F (37.1 °C) (06/10/20 1251)  Pulse: (!) 112 (06/10/20 1251)  Resp: 20 (06/10/20 1251)  BP: 108/67 (06/10/20 1251)  SpO2: 96 % (06/10/20 1251) Vital Signs (24h Range):  Temp:  [97.6 °F (36.4 °C)-99.5 °F (37.5 °C)] 98.8 °F (37.1 °C)  Pulse:  [] 112  Resp:  [18-20] 20  SpO2:  [95 %-99 %] 96 %  BP: (101-141)/(55-83) 108/67     Weight: 59 kg (130 lb)  Body mass index is 17.63 kg/m².    Intake/Output Summary (Last 24 hours) at 6/10/2020 1516  Last data filed at 6/10/2020 1300  Gross per 24 hour   Intake 170 ml   Output --   Net 170 ml      Physical Exam   Constitutional: She is oriented to person, place, and time. Vital signs are normal. She appears well-developed and well-nourished. She is cooperative. She has a sickly appearance. No distress.   Thin, frail, CF resting comfortably in bed in no acute distress.   HENT:   Head: Normocephalic and atraumatic.   Eyes: Conjunctivae are normal.   PERRL   Neck: Neck supple. No JVD present.   Cardiovascular: Regular rhythm and normal heart sounds. Tachycardia present.   Pulmonary/Chest: Effort normal and breath sounds normal.   Comfortable on RA.   Abdominal: Soft. Bowel sounds are normal. She exhibits distension and ascites. There is tenderness.   + Ascites.  Normoactive BS.  Mild, generalized tenderness.   Musculoskeletal: Normal range of motion.   Neurological: She is alert and oriented to person, place, and time.   Skin: Skin is warm and dry.   Blanchable redness to sacrum.    Psychiatric: Her speech is normal and behavior is normal. Judgment and thought content normal. Her affect is not blunt. She is not slowed, not withdrawn and not combative. Cognition and memory are normal. She exhibits a depressed mood.   Nursing note and vitals reviewed.      Significant Labs:   CBC:   Recent Labs   Lab 06/09/20  0645 06/10/20  0626   WBC 2.83* 6.55   HGB 8.6* 11.4*   HCT 27.1* 36.6*   PLT 88* 91*     CMP:   Recent Labs   Lab  06/09/20  0645 06/10/20  0626    136   K 4.0 4.6    104   CO2 23 20*   GLU 84 117*   BUN 18 13   CREATININE 1.2 1.0   CALCIUM 7.3* 7.8*   PROT 6.3 7.6   ALBUMIN 2.0* 2.3*   BILITOT 0.7 1.0   ALKPHOS 152* 163*   AST 34 43*   ALT 21 25   ANIONGAP 6* 12   EGFRNONAA 49* >60     Coagulation:   Recent Labs   Lab 06/10/20  0626   INR 1.2       Significant Imaging: I have reviewed all pertinent imaging results/findings within the past 24 hours.      Assessment/Plan:      * Acute hepatic encephalopathy  - Now resolved  - Continue Lactulose TID   - Continue Rifaximin  - Neuro checks q 4 hours  - Monitor       Cirrhosis of liver with ascites related to hepatitis C virus  - Continue Lactulose and Rifaximin  - s/p paracentesis with removal of 2.3L  - Fluid studies consistent with SBP   - Currently on Rocephin given Strep bacteremia  - Home diuretics resumed  - Repeat paracentesis pending  - Daily labs  - Patient wishes to pursue Hospice care upon DC; Case d/w GI, who agrees with Hospice care after resolution of acute issues  - CM following for DC planning    MELD-Na score: 8 at 6/10/2020  6:26 AM  MELD score: 8 at 6/10/2020  6:26 AM  Calculated from:  Serum Creatinine: 1.0 mg/dL at 6/10/2020  6:26 AM  Serum Sodium: 136 mmol/L at 6/10/2020  6:26 AM  Total Bilirubin: 1.0 mg/dL at 6/10/2020  6:26 AM  INR(ratio): 1.2 at 6/10/2020  6:26 AM  Age: 59 years      Bacteremia  - Preliminary BC from 6/6 show viridans strep  - ID consulted; currently on Rocephin and Vancomycin for now with additional recs pending  - ECHO negative for vegetation  - Repeat BC show NGTD  - Will discuss antibiotic plan with ID in preparation for DC planning.      SBP (spontaneous bacterial peritonitis)  - s/p paracentesis on 6/8 with removal of 2.3L  - Fluid studies consistent with SBP  - Fluid cx with no growth thus far  - Continue Rocephin  - Repeat paracentesis pending      Abnormal CT of the abdomen  - CT abdomen showed findings most likely  represent colitis, gastroenteritis or diarrheal disease.  Negative for acute process otherwise  - Remains afebrile with no leukocytosis  - Denies N/V  - On Lactulose, so patient with frequent stools  - Stool Cx, ova/parasites pending  - Continue Rocephin   - Will use cultures to guide ABX therapy  - Monitor     Polysubstance abuse  - Counseled extensively on the need for complete illicit drug cessation, patient verbalizes understanding and expresses desire to quit, however she has had multiple admissions over the past several months due to noncompliance with home medications and continued ongoing illicit drug use    Pancytopenia  - Counts appears stable  - Monitor with daily CBC      Tobacco abuse  - Counseled on cessation      Chronic bilateral low back pain without sciatica  - PRN analgesia        Severe malnutrition  - Dietician consult pending for any additional recommendations      VTE Risk Mitigation (From admission, onward)         Ordered     Place sequential compression device  Until discontinued      06/09/20 7225                      Ariadna Alegre, TIMA, ACNP-BC  Department of Hospital Medicine   [facility]

## 2020-06-10 NOTE — PLAN OF CARE
Discussed poc with pt, pt verbalized understanding    modified visitor policy per CDC guidelines  allowing one visitor from 10a-6pm    Purposeful rounding every 2hours    VS wnl  Cardiac monitoring in use, pt is ST, tele monitor # [id number]    Fall precautions in place, remains injury free  Pt denies c/o nausea  Pain under control with PRN meds    Neuro checks wnl    Accurate I&Os  Abx given as prescribed  Bed locked at lowest position  Call light within reach    Chart check complete  Will cont to monitor

## 2020-06-10 NOTE — PT/OT/SLP PROGRESS
"Occupational Therapy      Patient Name:  Jossy Siegel   MRN:  [id number]    O.T. COMPLETED CHART REVIEW. PT MET IN  REPORTED , " I DON'T FEEL GOOD."  PT REFUSED ALL TX       Blossom Cortes, OT  6/10/2020   1020  "

## 2020-06-10 NOTE — ASSESSMENT & PLAN NOTE
- Continue Lactulose and Rifaximin  - s/p paracentesis with removal of 2.3L  - Fluid studies consistent with SBP   - Currently on Rocephin given Strep bacteremia  - Home diuretics resumed  - Repeat paracentesis pending  - Daily labs  - Patient wishes to pursue Hospice care upon DC; Case d/w GI, who agrees with Hospice care after resolution of acute issues  - CM following for DC planning    MELD-Na score: 8 at 6/10/2020  6:26 AM  MELD score: 8 at 6/10/2020  6:26 AM  Calculated from:  Serum Creatinine: 1.0 mg/dL at 6/10/2020  6:26 AM  Serum Sodium: 136 mmol/L at 6/10/2020  6:26 AM  Total Bilirubin: 1.0 mg/dL at 6/10/2020  6:26 AM  INR(ratio): 1.2 at 6/10/2020  6:26 AM  Age: 59 years

## 2020-06-10 NOTE — SIGNIFICANT EVENT
Case discussed in detail with patient's daughter Flori who states her mom will be able to DC home with either herself, or another family member to a drug free environment.  CM to notify [facility].  Flori updated on POC, all questions answered.

## 2020-06-10 NOTE — PT/OT/SLP PROGRESS
"Physical Therapy      Patient Name:  Jossy Siegel   MRN:  [id number]    1020 P.T. COMPLETED CHART REVIEW. PT MET IN  REPORTED , " I DON'T FEEL GOOD."  PT REFUSED ALL TX   Mary Jo Torrez, PT,6/10/2020      "

## 2020-06-10 NOTE — CONSULTS
Screening performed on this 59 year old female patient for hx of documented PI. coholic cirrhosis with ascites, hepatitis C and ongoing substance abuse. she is awake and alert, assessment performed. Patient turned to left side independently for assessment.  BLE intact with no redness or breakdown noted, heels intact. Patient turned to left side independently. 2x1cm area of partial thickness tissue loss noted within gluteal cleft, appears to be from MASD. Patient reminded to reposition frequently. Recommend continued pressure injury prevention measures. Will follow.     MASD gluteal cleft:  1. Cleanse with saline or bath wipes  2. Pat dry  3. Apply thin layer of critic aide paste  4. Perform twice daily and with hygiene care

## 2020-06-11 VITALS
HEIGHT: 72 IN | DIASTOLIC BLOOD PRESSURE: 54 MMHG | OXYGEN SATURATION: 95 % | RESPIRATION RATE: 18 BRPM | HEART RATE: 97 BPM | TEMPERATURE: 99 F | WEIGHT: 130 LBS | SYSTOLIC BLOOD PRESSURE: 98 MMHG | BODY MASS INDEX: 17.61 KG/M2

## 2020-06-11 PROBLEM — K76.82 ACUTE HEPATIC ENCEPHALOPATHY: Status: RESOLVED | Noted: 2020-05-13 | Resolved: 2020-06-11

## 2020-06-11 PROBLEM — R78.81 BACTEREMIA: Status: RESOLVED | Noted: 2020-06-08 | Resolved: 2020-06-11

## 2020-06-11 LAB
ALBUMIN FLD-MCNC: <0.3 G/DL
ALBUMIN SERPL BCP-MCNC: 1.9 G/DL (ref 3.5–5.2)
ALP SERPL-CCNC: 136 U/L (ref 55–135)
ALT SERPL W/O P-5'-P-CCNC: 20 U/L (ref 10–44)
ANION GAP SERPL CALC-SCNC: 8 MMOL/L (ref 8–16)
APPEARANCE FLD: NORMAL
AST SERPL-CCNC: 32 U/L (ref 10–40)
BASOPHILS # BLD AUTO: 0.03 K/UL (ref 0–0.2)
BASOPHILS NFR BLD: 0.6 % (ref 0–1.9)
BILIRUB SERPL-MCNC: 0.9 MG/DL (ref 0.1–1)
BODY FLD TYPE: NORMAL
BODY FLUID SOURCE, LDH: NORMAL
BUN SERPL-MCNC: 12 MG/DL (ref 8–23)
CALCIUM SERPL-MCNC: 7.3 MG/DL (ref 8.7–10.5)
CHLORIDE SERPL-SCNC: 105 MMOL/L (ref 95–110)
CO2 SERPL-SCNC: 20 MMOL/L (ref 23–29)
COLOR FLD: COLORLESS
CREAT SERPL-MCNC: 1 MG/DL (ref 0.5–1.4)
DIFFERENTIAL METHOD: ABNORMAL
EOSINOPHIL # BLD AUTO: 0.3 K/UL (ref 0–0.5)
EOSINOPHIL NFR BLD: 6.2 % (ref 0–8)
ERYTHROCYTE [DISTWIDTH] IN BLOOD BY AUTOMATED COUNT: 15.9 % (ref 11.5–14.5)
EST. GFR  (AFRICAN AMERICAN): >60 ML/MIN/1.73 M^2
EST. GFR  (NON AFRICAN AMERICAN): >60 ML/MIN/1.73 M^2
GLUCOSE SERPL-MCNC: 109 MG/DL (ref 70–110)
HCT VFR BLD AUTO: 26 % (ref 37–48.5)
HGB BLD-MCNC: 8.5 G/DL (ref 12–16)
IMM GRANULOCYTES # BLD AUTO: 0.07 K/UL (ref 0–0.04)
IMM GRANULOCYTES NFR BLD AUTO: 1.4 % (ref 0–0.5)
INR PPP: 1.3 (ref 0.8–1.2)
LDH FLD L TO P-CCNC: 56 U/L
LYMPHOCYTES # BLD AUTO: 0.5 K/UL (ref 1–4.8)
LYMPHOCYTES NFR BLD: 10.7 % (ref 18–48)
MAGNESIUM SERPL-MCNC: 1.4 MG/DL (ref 1.6–2.6)
MCH RBC QN AUTO: 32.8 PG (ref 27–31)
MCHC RBC AUTO-ENTMCNC: 32.7 G/DL (ref 32–36)
MCV RBC AUTO: 100 FL (ref 82–98)
MONOCYTES # BLD AUTO: 0.8 K/UL (ref 0.3–1)
MONOCYTES NFR BLD: 15.5 % (ref 4–15)
MONOS+MACROS NFR FLD MANUAL: 77 %
NEUTROPHILS # BLD AUTO: 3.2 K/UL (ref 1.8–7.7)
NEUTROPHILS NFR BLD: 65.6 % (ref 38–73)
NEUTROPHILS NFR FLD MANUAL: 23 %
NRBC BLD-RTO: 0 /100 WBC
O+P STL MICRO: NORMAL
PHOSPHATE SERPL-MCNC: 2 MG/DL (ref 2.7–4.5)
PLATELET # BLD AUTO: 91 K/UL (ref 150–350)
PMV BLD AUTO: 11.3 FL (ref 9.2–12.9)
POTASSIUM SERPL-SCNC: 3 MMOL/L (ref 3.5–5.1)
PROT FLD-MCNC: <1 G/DL
PROT SERPL-MCNC: 6.2 G/DL (ref 6–8.4)
PROTHROMBIN TIME: 13.9 SEC (ref 9–12.5)
RBC # BLD AUTO: 2.59 M/UL (ref 4–5.4)
SODIUM SERPL-SCNC: 133 MMOL/L (ref 136–145)
SPECIMEN SOURCE: NORMAL
SPECIMEN SOURCE: NORMAL
WBC # BLD AUTO: 4.84 K/UL (ref 3.9–12.7)
WBC # FLD: 99 /CU MM

## 2020-06-11 PROCEDURE — 87205 SMEAR GRAM STAIN: CPT

## 2020-06-11 PROCEDURE — 83615 LACTATE (LD) (LDH) ENZYME: CPT

## 2020-06-11 PROCEDURE — 85025 COMPLETE CBC W/AUTO DIFF WBC: CPT

## 2020-06-11 PROCEDURE — 80053 COMPREHEN METABOLIC PANEL: CPT

## 2020-06-11 PROCEDURE — 97110 THERAPEUTIC EXERCISES: CPT

## 2020-06-11 PROCEDURE — 97116 GAIT TRAINING THERAPY: CPT

## 2020-06-11 PROCEDURE — 84100 ASSAY OF PHOSPHORUS: CPT

## 2020-06-11 PROCEDURE — 84157 ASSAY OF PROTEIN OTHER: CPT

## 2020-06-11 PROCEDURE — 36415 COLL VENOUS BLD VENIPUNCTURE: CPT

## 2020-06-11 PROCEDURE — 63600175 PHARM REV CODE 636 W HCPCS: Performed by: NURSE PRACTITIONER

## 2020-06-11 PROCEDURE — 87070 CULTURE OTHR SPECIMN AEROBIC: CPT

## 2020-06-11 PROCEDURE — 82042 OTHER SOURCE ALBUMIN QUAN EA: CPT

## 2020-06-11 PROCEDURE — 85610 PROTHROMBIN TIME: CPT

## 2020-06-11 PROCEDURE — 83735 ASSAY OF MAGNESIUM: CPT

## 2020-06-11 PROCEDURE — 89051 BODY FLUID CELL COUNT: CPT

## 2020-06-11 PROCEDURE — 87075 CULTR BACTERIA EXCEPT BLOOD: CPT

## 2020-06-11 PROCEDURE — 25000003 PHARM REV CODE 250: Performed by: NURSE PRACTITIONER

## 2020-06-11 RX ORDER — SODIUM,POTASSIUM PHOSPHATES 280-250MG
1 POWDER IN PACKET (EA) ORAL
Status: DISCONTINUED | OUTPATIENT
Start: 2020-06-11 | End: 2020-06-11 | Stop reason: HOSPADM

## 2020-06-11 RX ORDER — CEFDINIR 300 MG/1
300 CAPSULE ORAL 2 TIMES DAILY
Qty: 20 CAPSULE | Refills: 0 | Status: SHIPPED | OUTPATIENT
Start: 2020-06-11 | End: 2020-06-21

## 2020-06-11 RX ORDER — POTASSIUM CHLORIDE 20 MEQ/1
40 TABLET, EXTENDED RELEASE ORAL ONCE
Status: COMPLETED | OUTPATIENT
Start: 2020-06-11 | End: 2020-06-11

## 2020-06-11 RX ADMIN — MORPHINE SULFATE 1 MG: 2 INJECTION, SOLUTION INTRAMUSCULAR; INTRAVENOUS at 10:06

## 2020-06-11 RX ADMIN — POTASSIUM & SODIUM PHOSPHATES POWDER PACK 280-160-250 MG 1 PACKET: 280-160-250 PACK at 11:06

## 2020-06-11 RX ADMIN — MORPHINE SULFATE 1 MG: 2 INJECTION, SOLUTION INTRAMUSCULAR; INTRAVENOUS at 04:06

## 2020-06-11 RX ADMIN — POTASSIUM & SODIUM PHOSPHATES POWDER PACK 280-160-250 MG 1 PACKET: 280-160-250 PACK at 05:06

## 2020-06-11 RX ADMIN — FUROSEMIDE 40 MG: 40 TABLET ORAL at 09:06

## 2020-06-11 RX ADMIN — SPIRONOLACTONE 100 MG: 25 TABLET ORAL at 09:06

## 2020-06-11 RX ADMIN — RIFAXIMIN 550 MG: 550 TABLET ORAL at 09:06

## 2020-06-11 RX ADMIN — FUROSEMIDE 40 MG: 40 TABLET ORAL at 05:06

## 2020-06-11 RX ADMIN — LACTULOSE 15 G: 20 SOLUTION ORAL at 04:06

## 2020-06-11 RX ADMIN — POTASSIUM CHLORIDE 40 MEQ: 20 TABLET, EXTENDED RELEASE ORAL at 09:06

## 2020-06-11 RX ADMIN — PANTOPRAZOLE SODIUM 40 MG: 40 TABLET, DELAYED RELEASE ORAL at 09:06

## 2020-06-11 RX ADMIN — LACTULOSE 15 G: 20 SOLUTION ORAL at 09:06

## 2020-06-11 NOTE — OP NOTE
Pre Op Diagnosis: ascites     Post Op Diagnosis: same     Procedure:  para     Procedure performed by: Ameya MONTENEGRO, Herbert ONTIVEROS     Written Informed Consent Obtained: Yes     Specimen Removed:  yes     Estimated Blood Loss:  minimal     Findings: Local anesthesia.     The patient tolerated the procedure well and there were no complications.      Sterile technique was performed in the LLQ, lidocaine was used as a local anesthetic.   4.5 liters of light colette fluid removed for therapeutic purposes.  Pt tolerated the procedure well without immediate complications.  Please see radiologist report for details. F/u with PCP and/or ordering physician.

## 2020-06-11 NOTE — PLAN OF CARE
06/11/20 1812   Final Note   Assessment Type Final Discharge Note   Anticipated Discharge Disposition HospiceHome   Right Care Referral Info   Post Acute Recommendation Other   Referral Type Hospice   Facility Name [facility]

## 2020-06-11 NOTE — DISCHARGE SUMMARY
[facility] Hospital Medicine  Discharge Summary      Patient Name: Jossy Siegel  MRN: [id number]  Admission Date: 6/6/2020  Hospital Length of Stay: 4 days  Discharge Date and Time:  06/11/2020 3:04 PM  Attending Physician: David Nieto MD   Discharging Provider: Ariadna Alegre NP  Primary Care Provider: Primary Doctor Alessandra      HPI:   Jossy Siegel is a 59 year old female with alcoholic cirrhosis with ascites, hepatitis C and ongoing substance abuse who presented to the ED with reports of AMS. The patient was hospitalized from 6/1/20 to 6/2/20 with similar complaints. She was treated for hepatic encephalopathy. On 6/5/20, she had an outpatient paracentesis on with removal of 3.5 liters of fluid. The patient is unable to provide any additional history at this time. Labs in the ED were significant for an ammonia level of 129, WBC count of 3,520, hemoglobin of 10.7, platelet count of 112,000, creatinine of 2.3 which is elevated from her baseline of 1, albumin of 2.4, total bilirubin of 1.1 and INR of 1.3. Urine drug screen was positive for cocaine and amphetamines.     * No surgery found *      Hospital Course:   On 6/6 Patient was admitted to the Medicine unit secondary to Hepatic Encephalopathy and KJ (Cr elevated to 2.3) for which she was given a Lactulose enema in the ER and then transitioned to oral Lactulose once more awake.  Also started on gentle IVFs for KJ.  Ammonia on admit elevated to 139.  Of note, patient has had multiple recent admissions for the same and continues to use Cocaine and Amphetamines, despite numerous conversations regarding the need for cessation.      As of 6/7 Patient is now AAOx3, and conversing appropriately.  Ammonia improved to 38 this morning.  Cr remains elevated at 2.3.  Will plan to continue gentle IVFs overnight and anticipate DC home in AM if renal function improves.  Detailed conversation held again today, for >15 minutes regarding the  complete need for illicit drug cessation; patient does not appear interested in stopping at this time.      As of 6/8 Patient noted to have increased abdominal distention yesterday evening for which a KUB and CT were ordered.  CT showed there is fluid throughout the nondistended colon.  The ascending colon appears to be thick walled, similar to the comparison study. There is some fluid in nondistended distal small bowel.  Contrast fills the proximal small bowel.  There are no dilated loops of bowel or free air noted.  Findings most likely represent colitis, gastroenteritis or diarrheal disease.  Negative for acute process otherwise.  Preliminary BC from 6/6 show Viridans strep.  Case d/w ID and patient was started on Rocephin and Vancomycin.  Repeat BC ordered for the AM.  ECHO pending.  To undergo paracentesis today.  Per detailed d/w patient yesterday, she is now a DNR and would like to pursue hospice placement upon DC.  CM following for DC planning.  Case d/w GI today as patient is well known to Dr. Green.  GI agrees with hospice placement after treatment of acute medical issues.  Will await further recs from ID and use cultures to guide ABX therapy.  Patient remains AAOx3., with resolution of hepatic encephalopathy.    As of 6/9 Patient is s/p paracentesis removal of 2.3 L.  Fluid studies are consistent with SBP, cultures pending.  Currently on Rocephin and vancomycin (started on Sunday).  Will plan to repeat paracentesis on Thursday to check for improvement in counts.  ID consulted given bacteremia.  Repeat BC today pending.      As of 6/10 Patient noted to have marked increase in abdominal distention today.  Repeat paracentesis ordered.  Peritoneal fluid from 06/8 shows NGTD.  Repeat blood cultures on 6/9 show no growth to date.  Patient remains afebrile with no leukocytosis.  Will discuss antibiotic plan with ID in preparation for DC planning.      As of 6/11 Patient has been accepted [facility] the [facility] with plans to DC home with her niece Yolie who is a nurse after paracentesis is complete today.  Case d/w Dr. Victor given strep bacteremia and SBP.  Per ID, will plan to DC home on Omnicef 300 mg p.o. b.i.d. times 10 days.  Patient was also instructed to continue her Cipro weekly afterwards for SBP prophylaxis.  Will repeat fluid studies with paracentesis today to check for improvement.  Patient remains afebrile with no leukocytosis.  After resolution of SBP, patient may be a candidate to have a drain placed, to be managed by Hospice; message sent to Dr. Green to have her office arrange, if indicated.  Case d/w Dr. Nieto.  Patient seen and examined and deemed medically stable to DC home with her niece today to the care of hospice.  Per patient's wishes she is a DNR.  Medications reconciled for DC home.  KCL repleted prior to DC.     Consults:   Consults (From admission, onward)        Status Ordering Provider     Inpatient consult to Infectious Diseases  Once     Provider:  (Not yet assigned)    Acknowledged MARIANNA CRAIG     Inpatient consult to Registered Dietitian/Nutritionist  Once     Provider:  (Not yet assigned)    Completed MARIANNA CRAIG     Inpatient consult to Social Work  Once     Provider:  (Not yet assigned)    Completed ADIEL OLIVARES          No new Assessment & Plan notes have been filed under this hospital service since the last note was generated.  Service: Hospital Medicine    Final Active Diagnoses:    Diagnosis Date Noted POA    Cirrhosis of liver with ascites related to hepatitis C virus [K74.60, R18.8] 02/17/2020 Yes     Chronic    SBP (spontaneous bacterial peritonitis) [K65.2] 01/26/2020 Yes    Abnormal CT of the abdomen [R93.5] 06/08/2020 No    Polysubstance abuse [F19.10] 04/22/2020 Yes    Pancytopenia [D61.818] 02/05/2020 Yes     Chronic    Tobacco abuse [Z72.0] 04/22/2020 Yes     Chronic    Chronic bilateral low back pain without sciatica [M54.5, G89.29]  04/23/2020 Yes     Chronic    Severe malnutrition [E43] 06/07/2020 Yes      Problems Resolved During this Admission:    Diagnosis Date Noted Date Resolved POA    PRINCIPAL PROBLEM:  Acute hepatic encephalopathy [K72.00] 05/13/2020 06/11/2020 Yes    Bacteremia [R78.81] 06/08/2020 06/11/2020 No    KJ (acute kidney injury) [N17.9] 06/07/2020 06/09/2020 Yes       Discharged Condition: Guarded.    Disposition: Home or Self Care    Follow Up:  Follow-up Information     [facility].    Specialty:  Hospice Services  Why:  Patient to DC home today to care of family and [facility].  Contact information:  [address] [facility] [address] [phone]             Alma Delia Green MD.    Specialties:  Gastroenterology, Hepatology  Why:  Follow-up with GI as directed for possibility of drain placement once infection improves.  Contact information:  [address]  [facility] [address] [phone]                 Patient Instructions:      Diet Cardiac     Activity as tolerated       Significant Diagnostic Studies: Labs:   CMP   Recent Labs   Lab 06/10/20  0626 06/11/20  0602    133*   K 4.6 3.0*    105   CO2 20* 20*   * 109   BUN 13 12   CREATININE 1.0 1.0   CALCIUM 7.8* 7.3*   PROT 7.6 6.2   ALBUMIN 2.3* 1.9*   BILITOT 1.0 0.9   ALKPHOS 163* 136*   AST 43* 32   ALT 25 20   ANIONGAP 12 8   ESTGFRAFRICA >60 >60   EGFRNONAA >60 >60   , CBC   Recent Labs   Lab 06/10/20  0626 06/11/20  0602   WBC 6.55 4.84   HGB 11.4* 8.5*   HCT 36.6* 26.0*   PLT 91* 91*    and INR   Lab Results   Component Value Date    INR 1.3 (H) 06/11/2020    INR 1.2 06/10/2020    INR 1.2 06/09/2020     Microbiology:   Blood Culture   Lab Results   Component Value Date    LABBLOO No Growth to date 06/09/2020    LABBLOO No Growth to date 06/09/2020       Pending Diagnostic Studies:     Procedure Component Value Units Date/Time    US Guided Paracentesis [[id number]]     Order Status:  Sent Lab Status:  No  result          Medications:  Reconciled Home Medications:      Medication List      START taking these medications    cefdinir 300 MG capsule  Commonly known as:  OMNICEF  Take 1 capsule (300 mg total) by mouth 2 (two) times daily. for 10 days        CONTINUE taking these medications    albuterol 90 mcg/actuation inhaler  Commonly known as:  PROVENTIL/VENTOLIN HFA  Inhale 2 puffs into the lungs every 6 (six) hours.     amitriptyline 25 MG tablet  Commonly known as:  ELAVIL  Take 25 mg by mouth every evening.     ciprofloxacin HCl 500 MG tablet  Commonly known as:  CIPRO  Take 1 tablet (500 mg total) by mouth once a week.     cyclobenzaprine 10 MG tablet  Commonly known as:  FLEXERIL  TAKE 1 TABLET BY MOUTH 3 (THREE) TIMES DAILY AS NEEDED FOR MUSCLE SPASMS (PAIN) FOR UP TO 10 DAYS.     furosemide 40 MG tablet  Commonly known as:  LASIX  Take 1 tablet (40 mg total) by mouth 2 (two) times daily.     lactulose 10 gram/15 ml 10 gram/15 mL (15 mL) solution  Commonly known as:  CHRONULAC  Take 15 mLs (10 g total) by mouth 3 (three) times daily.     metoprolol tartrate 25 MG tablet  Commonly known as:  LOPRESSOR  Take 1 tablet (25 mg total) by mouth 2 (two) times daily.     ondansetron 4 MG tablet  Commonly known as:  ZOFRAN  TAKE 1 TABLET BY MOUTH EVERY 8 (EIGHT) HOURS AS NEEDED FOR NAUSEA FORUP TO 7 DAYS.     ondansetron 4 MG Tbdl  Commonly known as:  ZOFRAN-ODT  Take 1 tablet (4 mg total) by mouth every 8 (eight) hours as needed (nausea).     pantoprazole 40 MG tablet  Commonly known as:  PROTONIX  Take 1 tablet (40 mg total) by mouth 2 (two) times daily.     rifAXImin 200 mg Tab  Commonly known as:  XIFAXAN  TAKE 1 TABLET BY MOUTH 2 (TWO) TIMES DAILY BEFORE MEALS FOR 14 DAYS.     spironolactone 100 MG tablet  Commonly known as:  ALDACTONE  Take 1 tablet (100 mg total) by mouth 2 (two) times daily.            Indwelling Lines/Drains at time of discharge:   Lines/Drains/Airways     None                 Time spent on the  discharge of patient: 40 minutes  Patient was seen and examined on the date of discharge and determined to be suitable for discharge.         Ariadna Alegre DNP, ACNP-BC  Department of Hospital Medicine  [facility]

## 2020-06-11 NOTE — PLAN OF CARE
Spoke with daughter, Flori.  Her cousin has agreed to take the patient in and would like to speak to the  with questions about hospice.    9:45am Received call from Yolie Emerson, jeanetteece of patient.  She is willing to take the patient to he home but she was a nurse with [facility] in the past and preferred to use that company.  Advised patient will have to agree.    10:00am Visited with patient.  She agreed to be discharged to the niece's home with [facility].  Preference signed.

## 2020-06-11 NOTE — NURSING
Pt discharged home with hospice. AVS reviewed and explained with pt, verbalized understanding. Medications delivered to bedside. Follow up appointments made. IV removed All belongings accompanied pt.

## 2020-06-11 NOTE — PLAN OF CARE
Received call from Judi with [facility].  They have sent paperwork to dougie Ruby to sign and are awaiting the return of the paperwork.     06/11/20 1358   Post-Acute Status   Post-Acute Authorization Hospice   Hospice Status Awaiting Orders for Hospice

## 2020-06-11 NOTE — PLAN OF CARE
Preference signed for [facility].  Referral sent via [facility].     06/11/20 6828   Post-Acute Status   Post-Acute Authorization Hospice   Hospice Status Referrals Sent

## 2020-06-11 NOTE — PT/OT/SLP PROGRESS
Physical Therapy  Treatment    Jossy Siegel   MRN: [id number]   Admitting Diagnosis: Acute hepatic encephalopathy    PT Received On: 06/11/20  PT Start Time: 0945     PT Stop Time: 1010    PT Total Time (min): 25 min       Billable Minutes:  Gait Training 15 and Therapeutic Exercise 10    Treatment Type: Treatment  PT/PTA: PT     PTA Visit Number: 0       General Precautions: Standard, fall  Orthopedic Precautions: N/A   Braces: N/A    Spiritual, Cultural Beliefs, Taoism Practices, Values that Affect Care: no    Subjective:  Communicated with NURSE SOLIS AND Epic CHART REVIEW  prior to session.   PT AGREED TO TX     Pain/Comfort  Pain Rating 1: 0/10  Pain Rating Post-Intervention 1: 0/10    Objective:   Patient found with: peripheral IV, telemetry    Functional Mobility:  PT MET IN RM SUP>SIT EOB WITH SBA. PT STOOD WITH RW AND CGA WITH INC CUES TO MANAGE RW. PT GT TRAINED X 450' WITH CGA. PT RETURNED TO RM T/F TO CHAIR WITH RW AND COMPLETED B LE TE X 20 REPS OF AP, TKE, MIP.     AM-PAC 6 CLICK MOBILITY  How much help from another person does this patient currently need?   1 = Unable, Total/Dependent Assistance  2 = A lot, Maximum/Moderate Assistance  3 = A little, Minimum/Contact Guard/Supervision  4 = None, Modified [place]/Independent    Turning over in bed (including adjusting bedclothes, sheets and blankets)?: 4  Sitting down on and standing up from a chair with arms (e.g., wheelchair, bedside commode, etc.): 3  Moving from lying on back to sitting on the side of the bed?: 4  Moving to and from a bed to a chair (including a wheelchair)?: 3  Need to walk in hospital room?: 3  Climbing 3-5 steps with a railing?: 1  Basic Mobility Total Score: 18    AM-PAC Raw Score CMS G-Code Modifier Level of Impairment Assistance   6 % Total / Unable   7 - 9 CM 80 - 100% Maximal Assist   10 - 14 CL 60 - 80% Moderate Assist   15 - 19 CK 40 - 60% Moderate Assist   20 - 22 CJ 20 - 40% Minimal Assist   23 CI  1-20% SBA / CGA   24 CH 0% Independent/ Mod I     Patient left up in chair with call button in reach and chair alarm on.    Assessment:  PT TONY GT TRAINING.     Rehab identified problem list/impairments: Rehab identified problem list/impairments: weakness, impaired endurance, impaired functional mobilty, gait instability, impaired balance, decreased lower extremity function, decreased ROM    Rehab potential is good.    Activity tolerance: Fair    Discharge recommendations: Discharge Facility/Level of Care Needs: home health PT     Barriers to discharge:      Equipment recommendations: Equipment Needed After Discharge: none     GOALS:   Multidisciplinary Problems     Physical Therapy Goals        Problem: Physical Therapy Goal    Goal Priority Disciplines Outcome Goal Variances Interventions   Physical Therapy Goal     PT, PT/OT Ongoing, Progressing     Description:  1. Patient will perform supine to/from sit mod indep  2. Patient will perform sit to/from stand with RW mod indep  3. Patient will amb >300ft RW mod indep no gross LOB                    PLAN:    Patient to be seen 5 x/week  to address the above listed problems via gait training, therapeutic activities, therapeutic exercises  Plan of Care expires: 06/14/20  Plan of Care reviewed with: patient         Mary Jo Oscarivania, PT  06/11/2020

## 2020-06-11 NOTE — PLAN OF CARE
Remains free from harm, no c/o pain this shift, positions self, no acute distress noted. 24 hour chart check complete.

## 2020-06-12 LAB
BACTERIA BLD CULT: NORMAL
BACTERIA SPEC AEROBE CULT: NO GROWTH
GRAM STN SPEC: NORMAL
GRAM STN SPEC: NORMAL
PATH INTERP FLD-IMP: NORMAL

## 2020-06-13 LAB
BACTERIA STL CULT: NORMAL
BACTERIA STL CULT: NORMAL

## 2020-06-14 LAB
BACTERIA BLD CULT: NORMAL
BACTERIA BLD CULT: NORMAL

## 2020-06-15 LAB
BACTERIA SPEC AEROBE CULT: NO GROWTH
BACTERIA SPEC ANAEROBE CULT: NORMAL

## 2020-06-16 LAB — BACTERIA SPEC ANAEROBE CULT: NORMAL

## 2020-06-19 ENCOUNTER — TELEPHONE (OUTPATIENT)
Dept: RADIOLOGY | Facility: HOSPITAL | Age: 60
End: 2020-06-19

## 2020-06-19 LAB — BACTERIA SPEC ANAEROBE CULT: NORMAL

## 2020-06-29 ENCOUNTER — TELEPHONE (OUTPATIENT)
Dept: INTERNAL MEDICINE | Facility: CLINIC | Age: 60
End: 2020-06-29

## 2020-06-29 NOTE — TELEPHONE ENCOUNTER
----- Message from Kelin Prescott sent at 6/19/2020  2:04 PM CDT -----  [facility] called to speak with the Doctor about pt hospital visit please reach out to Du at [phone]

## 2021-04-28 ENCOUNTER — PATIENT MESSAGE (OUTPATIENT)
Dept: RESEARCH | Facility: HOSPITAL | Age: 61
End: 2021-04-28

## 2023-05-03 ENCOUNTER — PATIENT MESSAGE (OUTPATIENT)
Dept: RESEARCH | Facility: HOSPITAL | Age: 63
End: 2023-05-03
Payer: MEDICAID

## 2023-08-08 NOTE — CONSULTS
Screening performed on this 59 year old female patient due to low carole. PMHx of liver cirrhosis related to HCV with ascites requiring weekly paracentesis, tobacco use, and history of polysubstance abuse (snorting cocaine and meth). She is admitted with hepatic encephalopathy. She is awake and alert, confused. Assessment performed. BLE intact with no redness or breakdown noted, heels intact. Patient turned to left side independently. 2x1cm area of partial thickness tissue loss noted within gluteal cleft, appears to be from MASD. Thin layer of critic aid paste applied to cover. Skin tear to left hand noted with foam dressing that is clean dry and intact, left in place. Patient reminded to reposition frequently. Recommend continued pressure injury prevention measures. Will follow.    MASD gluteal cleft:  1. Cleanse with saline or bath wipes  2. Pat dry  3. Apply thin layer of critic aide paste  4. Perform twice daily and with hygiene care    Skin tear to left hand:  1. Cleanse wound with sterile normal saline  3. Pat dry  4. Paint intact zohreh wound skin with Cavilon skin barrier  5. Apply bordered foam dressing  6. Change every 7 days and prn if soiled (strikethrough reaches 3/4 edges of dressing).     SHORT/LONG TERM GOALS    Pt will improve orientation to spatial and temporal surroundings with use of external memory aides. Pt will improve immediate, short term, recent memory during structured and unstructured tasks with 80% accuracy      Pt will improve problem solving/thought organization during structured and unstructured tasks with 80% accuracy      Pt will improve receptive and expressive language skills with adequate thought content, organization, and processing time to facilitate improved communication with moderate.      Pt will improve word finding and verbal fluency with phrase/sentence level, wh-questions and open ended conversation through incorporation of preparatory and circumlocution strategies 90% accuracy     Pt will improve speech intelligibility and increased articulatory precision via compensatory strategies and oral motor exercises

## 2024-03-05 NOTE — ADDENDUM NOTE
Addended by: NIGHAT ZAPATA on: 4/29/2020 12:13 PM     Modules accepted: Orders     [Fever] : no fever [Chills] : no chills [Fatigue] : no fatigue [Recent Change In Weight] : ~T recent weight change [Diarrhea: Grade 0] : Diarrhea: Grade 0 [Negative] : Allergic/Immunologic [FreeTextEntry2] : weight loss in the setting of Ozempic

## 2024-07-24 ENCOUNTER — PATIENT MESSAGE (OUTPATIENT)
Dept: HEPATOLOGY | Facility: CLINIC | Age: 64
End: 2024-07-24
Payer: MEDICAID